# Patient Record
Sex: FEMALE | Race: WHITE | NOT HISPANIC OR LATINO | Employment: UNEMPLOYED | URBAN - METROPOLITAN AREA
[De-identification: names, ages, dates, MRNs, and addresses within clinical notes are randomized per-mention and may not be internally consistent; named-entity substitution may affect disease eponyms.]

---

## 2019-01-07 ENCOUNTER — OFFICE VISIT (OUTPATIENT)
Dept: OBSTETRICS AND GYNECOLOGY | Facility: CLINIC | Age: 34
End: 2019-01-07
Payer: MEDICAID

## 2019-01-07 ENCOUNTER — TELEPHONE (OUTPATIENT)
Dept: OBSTETRICS AND GYNECOLOGY | Facility: CLINIC | Age: 34
End: 2019-01-07

## 2019-01-07 VITALS — SYSTOLIC BLOOD PRESSURE: 116 MMHG | WEIGHT: 134.06 LBS | DIASTOLIC BLOOD PRESSURE: 70 MMHG

## 2019-01-07 DIAGNOSIS — O26.892 VAGINAL DISCHARGE DURING PREGNANCY IN SECOND TRIMESTER: ICD-10-CM

## 2019-01-07 DIAGNOSIS — N89.8 VAGINAL DISCHARGE DURING PREGNANCY IN SECOND TRIMESTER: ICD-10-CM

## 2019-01-07 DIAGNOSIS — O24.019 TYPE 1 DIABETES MELLITUS COMPLICATING PREGNANCY, ANTEPARTUM: Primary | ICD-10-CM

## 2019-01-07 DIAGNOSIS — Z32.01 POSITIVE PREGNANCY TEST: ICD-10-CM

## 2019-01-07 PROCEDURE — 99204 PR OFFICE/OUTPT VISIT, NEW, LEVL IV, 45-59 MIN: ICD-10-PCS | Mod: S$PBB,TH,, | Performed by: OBSTETRICS & GYNECOLOGY

## 2019-01-07 PROCEDURE — 87624 HPV HI-RISK TYP POOLED RSLT: CPT

## 2019-01-07 PROCEDURE — 99204 OFFICE O/P NEW MOD 45 MIN: CPT | Mod: S$PBB,TH,, | Performed by: OBSTETRICS & GYNECOLOGY

## 2019-01-07 PROCEDURE — 99999 PR PBB SHADOW E&M-NEW PATIENT-LVL IV: CPT | Mod: PBBFAC,,, | Performed by: OBSTETRICS & GYNECOLOGY

## 2019-01-07 PROCEDURE — 88175 CYTOPATH C/V AUTO FLUID REDO: CPT

## 2019-01-07 PROCEDURE — 87491 CHLMYD TRACH DNA AMP PROBE: CPT

## 2019-01-07 PROCEDURE — 99999 PR PBB SHADOW E&M-NEW PATIENT-LVL IV: ICD-10-PCS | Mod: PBBFAC,,, | Performed by: OBSTETRICS & GYNECOLOGY

## 2019-01-07 PROCEDURE — 87480 CANDIDA DNA DIR PROBE: CPT

## 2019-01-07 PROCEDURE — 99204 OFFICE O/P NEW MOD 45 MIN: CPT | Mod: PBBFAC,TH,PN | Performed by: OBSTETRICS & GYNECOLOGY

## 2019-01-07 RX ORDER — INSULIN ASPART 100 [IU]/ML
INJECTION, SOLUTION INTRAVENOUS; SUBCUTANEOUS
Qty: 10 ML | Refills: 5 | Status: SHIPPED | OUTPATIENT
Start: 2019-01-07 | End: 2019-09-17 | Stop reason: SDUPTHER

## 2019-01-07 RX ORDER — INSULIN GLARGINE 100 [IU]/ML
15 INJECTION, SOLUTION SUBCUTANEOUS 2 TIMES DAILY WITH MEALS
Qty: 10 ML | Refills: 5 | Status: SHIPPED | OUTPATIENT
Start: 2019-01-07 | End: 2019-02-26 | Stop reason: ALTCHOICE

## 2019-01-07 RX ORDER — INSULIN GLARGINE 100 [IU]/ML
INJECTION, SOLUTION SUBCUTANEOUS NIGHTLY
COMMUNITY
End: 2019-01-07 | Stop reason: SDUPTHER

## 2019-01-07 NOTE — TELEPHONE ENCOUNTER
----- Message from Philly Brush sent at 1/7/2019  3:15 PM CST -----  Contact: Fazal with [facility]  Fazal with [facility] [phone] called regarding LANTUS U-100 INSULIN; Novolog, free style test strips for above patient. They are requesting one of the following: Rx substitution. Fazal states that Lantuss and free style test strips are not covered by insurance and Novolog does not have a max daily dose. Please call Fazal. Thanks!

## 2019-01-07 NOTE — PROGRESS NOTES
Subjective:       Patient ID: Tish Landeros is a 33 y.o. female.    Chief Complaint:  Possible Pregnancy      History of Present Illness  HPI  Missed Menses/ Possible Pregnancy  Patient complains of menstrual irregularity.Last menses onset around 09/10/18 with regular menses reported. Patient reports positive UPT in 2018. She believes she could be pregnant. Pregnancy is desired. Sexual Activity: single partner, contraception: none. Current symptoms also include: positive home pregnancy test. Last period was normal.     GYN & OB History  Date of Last Pap: No result found   Patient reports having LEEP at age 16    OB History    Para Term  AB Living   3 2     1 2   SAB TAB Ectopic Multiple Live Births                  # Outcome Date GA Lbr [place]/2nd Weight Sex Delivery Anes PTL Lv   3 AB            2 Para            1 Para                   Review of Systems  Review of Systems   Constitutional: Negative for activity change, appetite change, chills, diaphoresis, fatigue, fever and unexpected weight change.   HENT: Negative for nasal congestion, mouth sores and tinnitus.    Eyes: Negative for discharge and visual disturbance.   Respiratory: Negative for cough, shortness of breath and wheezing.    Cardiovascular: Negative for chest pain, palpitations and leg swelling.   Gastrointestinal: Negative for abdominal pain, bloating, blood in stool, constipation, diarrhea, nausea, vomiting, reflux and fecal incontinence.   Endocrine: Positive for diabetes. Negative for hair loss, hot flashes, hyperthyroidism and hypothyroidism.   Genitourinary: Negative for bladder incontinence, decreased libido, dyspareunia, dysuria, flank pain, frequency, genital sores, hematuria, menorrhagia, menstrual problem, pelvic pain, urgency, vaginal bleeding, vaginal discharge, vaginal pain, dysmenorrhea, urinary incontinence, postcoital bleeding, postmenopausal bleeding and vaginal odor.   Musculoskeletal: Negative for  arthralgias, back pain, joint swelling, leg pain and myalgias.   Neurological: Negative for vertigo, seizures, syncope, numbness and headaches.   Hematological: Negative for adenopathy. Does not bruise/bleed easily.   Psychiatric/Behavioral: Negative for depression and sleep disturbance. The patient is not nervous/anxious.    Breast: Negative for asymmetry, breast self exam, lump, mass, mastodynia, nipple discharge, skin changes and tenderness          Objective:    Physical Exam:   Constitutional: She is oriented to person, place, and time. She appears well-developed and well-nourished. No distress.    HENT:   Head: Normocephalic.   Nose: No epistaxis.    Eyes: Pupils are equal, round, and reactive to light.    Neck: Normal range of motion.    Cardiovascular: Normal rate.     Pulmonary/Chest: Effort normal.        Abdominal: Soft. She exhibits no distension. There is no tenderness.     Genitourinary: Vaginal discharge found.   Genitourinary Comments: Uterus is 17 week size with positive FHT's @ 155bpm. Pap smear and cultures done of cervix.Cervix is friable. Vaginal cultures done.            Musculoskeletal: Normal range of motion and moves all extremeties.       Neurological: She is alert and oriented to person, place, and time.    Skin: Skin is warm and dry.    Psychiatric: She has a normal mood and affect. Her behavior is normal. Judgment and thought content normal.          Assessment:        1. Type 1 diabetes mellitus complicating pregnancy, antepartum    2. Positive pregnancy test                Plan:      Ultrasound and follow up in 1 week for initial prenatal visit   Referral to [facility] due to Type I D.M.

## 2019-01-08 ENCOUNTER — HOSPITAL ENCOUNTER (OUTPATIENT)
Dept: RADIOLOGY | Facility: HOSPITAL | Age: 34
Discharge: HOME | End: 2019-01-08
Attending: OBSTETRICS & GYNECOLOGY
Payer: MEDICAID

## 2019-01-08 DIAGNOSIS — Z32.01 POSITIVE PREGNANCY TEST: ICD-10-CM

## 2019-01-08 LAB
C TRACH DNA SPEC QL NAA+PROBE: NOT DETECTED
CANDIDA RRNA VAG QL PROBE: NEGATIVE
G VAGINALIS RRNA GENITAL QL PROBE: POSITIVE
N GONORRHOEA DNA SPEC QL NAA+PROBE: NOT DETECTED
T VAGINALIS RRNA GENITAL QL PROBE: NEGATIVE

## 2019-01-08 PROCEDURE — 76805 OB US >/= 14 WKS SNGL FETUS: CPT | Mod: 26,,, | Performed by: RADIOLOGY

## 2019-01-08 PROCEDURE — 76805 US OB GREATER THAN 14 WEEKS FIRST GESTATION: ICD-10-PCS | Mod: 26,,, | Performed by: RADIOLOGY

## 2019-01-08 PROCEDURE — 76805 OB US >/= 14 WKS SNGL FETUS: CPT | Mod: TC,PN

## 2019-01-09 PROBLEM — O26.892 VAGINAL DISCHARGE DURING PREGNANCY IN SECOND TRIMESTER: Status: ACTIVE | Noted: 2019-01-09

## 2019-01-09 PROBLEM — N89.8 VAGINAL DISCHARGE DURING PREGNANCY IN SECOND TRIMESTER: Status: ACTIVE | Noted: 2019-01-09

## 2019-01-09 RX ORDER — METRONIDAZOLE 500 MG/1
500 TABLET ORAL EVERY 12 HOURS
Qty: 14 TABLET | Refills: 0 | Status: SHIPPED | OUTPATIENT
Start: 2019-01-09 | End: 2019-01-16

## 2019-01-11 LAB
HPV HR 12 DNA CVX QL NAA+PROBE: POSITIVE
HPV16 AG SPEC QL: NEGATIVE
HPV18 DNA SPEC QL NAA+PROBE: NEGATIVE

## 2019-01-15 ENCOUNTER — LAB VISIT (OUTPATIENT)
Dept: LAB | Facility: HOSPITAL | Age: 34
End: 2019-01-15
Attending: OBSTETRICS & GYNECOLOGY
Payer: MEDICAID

## 2019-01-15 ENCOUNTER — ROUTINE PRENATAL (OUTPATIENT)
Dept: OBSTETRICS AND GYNECOLOGY | Facility: CLINIC | Age: 34
End: 2019-01-15
Payer: MEDICAID

## 2019-01-15 VITALS — WEIGHT: 139.31 LBS | DIASTOLIC BLOOD PRESSURE: 74 MMHG | SYSTOLIC BLOOD PRESSURE: 112 MMHG

## 2019-01-15 DIAGNOSIS — Z3A.19 19 WEEKS GESTATION OF PREGNANCY: Primary | ICD-10-CM

## 2019-01-15 DIAGNOSIS — O24.012 PRE-EXISTING TYPE 1 DIABETES MELLITUS DURING PREGNANCY IN SECOND TRIMESTER: ICD-10-CM

## 2019-01-15 DIAGNOSIS — Z3A.19 19 WEEKS GESTATION OF PREGNANCY: ICD-10-CM

## 2019-01-15 DIAGNOSIS — R87.810 PAP SMEAR OF CERVIX SHOWS HIGH RISK HPV PRESENT: ICD-10-CM

## 2019-01-15 LAB
BASOPHILS # BLD AUTO: 0.06 K/UL
BASOPHILS NFR BLD: 0.7 %
BILIRUB SERPL-MCNC: ABNORMAL MG/DL
BLOOD URINE, POC: ABNORMAL
COLOR, POC UA: ABNORMAL
DIFFERENTIAL METHOD: NORMAL
EOSINOPHIL # BLD AUTO: 0.2 K/UL
EOSINOPHIL NFR BLD: 2.4 %
ERYTHROCYTE [DISTWIDTH] IN BLOOD BY AUTOMATED COUNT: 12.6 %
GLUCOSE UR QL STRIP: ABNORMAL
HCT VFR BLD AUTO: 41 %
HGB BLD-MCNC: 13.7 G/DL
IMM GRANULOCYTES # BLD AUTO: 0.03 K/UL
IMM GRANULOCYTES NFR BLD AUTO: 0.3 %
KETONES UR QL STRIP: ABNORMAL
LEUKOCYTE ESTERASE URINE, POC: ABNORMAL
LYMPHOCYTES # BLD AUTO: 1.9 K/UL
LYMPHOCYTES NFR BLD: 21.2 %
MCH RBC QN AUTO: 30.7 PG
MCHC RBC AUTO-ENTMCNC: 33.4 G/DL
MCV RBC AUTO: 92 FL
MONOCYTES # BLD AUTO: 0.6 K/UL
MONOCYTES NFR BLD: 7 %
NEUTROPHILS # BLD AUTO: 6.2 K/UL
NEUTROPHILS NFR BLD: 68.4 %
NITRITE, POC UA: ABNORMAL
NRBC BLD-RTO: 0 /100 WBC
PH, POC UA: 7
PLATELET # BLD AUTO: 276 K/UL
PMV BLD AUTO: 11 FL
PROTEIN, POC: ABNORMAL
RBC # BLD AUTO: 4.46 M/UL
SPECIFIC GRAVITY, POC UA: ABNORMAL
UROBILINOGEN, POC UA: ABNORMAL
WBC # BLD AUTO: 9.06 K/UL

## 2019-01-15 PROCEDURE — 99213 PR OFFICE/OUTPT VISIT, EST, LEVL III, 20-29 MIN: ICD-10-PCS | Mod: TH,25,S$PBB, | Performed by: OBSTETRICS & GYNECOLOGY

## 2019-01-15 PROCEDURE — 88305 TISSUE SPECIMEN TO PATHOLOGY, OBSTETRICS/GYNECOLOGY: ICD-10-PCS | Mod: 26,,, | Performed by: PATHOLOGY

## 2019-01-15 PROCEDURE — 81511 FTL CGEN ABNOR FOUR ANAL: CPT

## 2019-01-15 PROCEDURE — 57455 BIOPSY OF CERVIX W/SCOPE: CPT | Mod: S$PBB,,, | Performed by: OBSTETRICS & GYNECOLOGY

## 2019-01-15 PROCEDURE — 99999 PR PBB SHADOW E&M-EST. PATIENT-LVL III: ICD-10-PCS | Mod: PBBFAC,,, | Performed by: OBSTETRICS & GYNECOLOGY

## 2019-01-15 PROCEDURE — 88305 TISSUE EXAM BY PATHOLOGIST: CPT | Performed by: PATHOLOGY

## 2019-01-15 PROCEDURE — 88342 IMHCHEM/IMCYTCHM 1ST ANTB: CPT | Performed by: PATHOLOGY

## 2019-01-15 PROCEDURE — 88342 TISSUE SPECIMEN TO PATHOLOGY, OBSTETRICS/GYNECOLOGY: ICD-10-PCS | Mod: 26,,, | Performed by: PATHOLOGY

## 2019-01-15 PROCEDURE — 99213 OFFICE O/P EST LOW 20 MIN: CPT | Mod: PBBFAC,TH,PN | Performed by: OBSTETRICS & GYNECOLOGY

## 2019-01-15 PROCEDURE — 80074 ACUTE HEPATITIS PANEL: CPT

## 2019-01-15 PROCEDURE — 57455 BIOPSY OF CERVIX W/SCOPE: CPT | Mod: PBBFAC,PN | Performed by: OBSTETRICS & GYNECOLOGY

## 2019-01-15 PROCEDURE — 88305 TISSUE EXAM BY PATHOLOGIST: CPT | Mod: 26,,, | Performed by: PATHOLOGY

## 2019-01-15 PROCEDURE — 86592 SYPHILIS TEST NON-TREP QUAL: CPT

## 2019-01-15 PROCEDURE — 85025 COMPLETE CBC W/AUTO DIFF WBC: CPT

## 2019-01-15 PROCEDURE — 86762 RUBELLA ANTIBODY: CPT

## 2019-01-15 PROCEDURE — 36415 COLL VENOUS BLD VENIPUNCTURE: CPT | Mod: PO

## 2019-01-15 PROCEDURE — 81002 URINALYSIS NONAUTO W/O SCOPE: CPT | Mod: PBBFAC,PN | Performed by: OBSTETRICS & GYNECOLOGY

## 2019-01-15 PROCEDURE — 86703 HIV-1/HIV-2 1 RESULT ANTBDY: CPT

## 2019-01-15 PROCEDURE — 99213 OFFICE O/P EST LOW 20 MIN: CPT | Mod: TH,25,S$PBB, | Performed by: OBSTETRICS & GYNECOLOGY

## 2019-01-15 PROCEDURE — 57455 PR COLPOSCOPY,CERVIX W/ADJ VAGINA,W/BX: ICD-10-PCS | Mod: S$PBB,,, | Performed by: OBSTETRICS & GYNECOLOGY

## 2019-01-15 PROCEDURE — 88342 IMHCHEM/IMCYTCHM 1ST ANTB: CPT | Mod: 26,,, | Performed by: PATHOLOGY

## 2019-01-15 PROCEDURE — 99999 PR PBB SHADOW E&M-EST. PATIENT-LVL III: CPT | Mod: PBBFAC,,, | Performed by: OBSTETRICS & GYNECOLOGY

## 2019-01-15 NOTE — PROGRESS NOTES
Patient reports blood sugars with good control. 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 19 weeks gestation of pregnancy  Maternal Quad Screen    Cystic Fibrosis Mutation Panel    RPR    CBC auto differential    Rubella antibody, IgG    Hepatitis panel, acute    HIV 1/2 Ag/Ab (4th Gen)       PRE-COLPOSCOPY PROCEDURE COUNSELING:  Discussed the abnormal pap test findings, HPV, need for colposcopy and possible biopsies to determine a diagnosis and plan of care, treatments available, the minimal risks of bleeding and infection with a colposcopy, alternatives to colposcopy and she agrees to proceed.    Procedure:   Speculum was placed. Cervix completely visualized.  transformation zone clearly visualized. Green filter activated: vascular abnormalities: not seen  Green filter disengaged and acetic acid applied in the usual fashion:  AcetoWhite epithelium seen.  Mosaic pattern not  seen.  Biopsy performed at 1 o'clock.  ECC not  done.    Monsel's solution applied to biopsy site for hemostasis and tolerated well.   The speculum was removed.  The patient tolerated the procedure well.    POST COLPOSCOPY COUNSELING:   Manage post colposcopy cramping with NSAIDs, Tylenol or Rx per [employer].  Avoid anything in vagina (intercourse, douching, tampons) one week after the procedure.  Expect a clumpy blackish vaginal discharge (Monsel's solution) for several days.  Report bleeding heavier than a period, worsening pain, fever > 101.0 F, or foul-smelling vaginal discharge.  HPV vaccine recommended according to FDA age guidelines.  Importance of follow-up stressed.    Counseling lasted approximately 15 minutes and all her questions were answered.    Assessment:  1. 19 weeks gestation of pregnancy  Maternal Quad Screen    Cystic Fibrosis Mutation Panel    RPR    CBC auto differential    Rubella antibody, IgG    Hepatitis panel, acute    HIV 1/2 Ag/Ab (4th Gen)        Plan:    Patient informed will be contacted within 2 weeks of results, either normal or abnormal. Please call if she has not heard from us by then.

## 2019-01-16 ENCOUNTER — TELEPHONE (OUTPATIENT)
Dept: OBSTETRICS AND GYNECOLOGY | Facility: CLINIC | Age: 34
End: 2019-01-16

## 2019-01-16 LAB
HAV IGM SERPL QL IA: NEGATIVE
HBV CORE IGM SERPL QL IA: NEGATIVE
HBV SURFACE AG SERPL QL IA: NEGATIVE
HCV AB SERPL QL IA: NEGATIVE
HIV 1+2 AB+HIV1 P24 AG SERPL QL IA: NEGATIVE
RPR SER QL: NORMAL
RUBV IGG SER-ACNC: 39.4 IU/ML
RUBV IGG SER-IMP: REACTIVE

## 2019-01-16 NOTE — TELEPHONE ENCOUNTER
I called the insurance to check on the PA and was told that I they do not handle [employer] PA claims. I was given a new number to call and got intouch with [employer]. A PA has been started and marked urgent. I was told by the rep on the phone that the pharmacist would review this and get back to me either today or in the morning. I let the patient know I would call her as soon as we received a call.

## 2019-01-16 NOTE — TELEPHONE ENCOUNTER
----- Message from Fartun Driscoll sent at 1/16/2019  3:55 PM CST -----  Contact: Patient  Type:  RX Refill Request    Who Called:  Patient    RX Name and Strength:      1.  insulin aspart U-100 (NOVOLOG U-100 INSULIN ASPART) 100 unit/mL injection       10 units prn elevated glucose    2.  insulin glargine (LANTUS U-100 INSULIN) 100 unit/mL injection       Inject 15 Units into the skin 2 (two) times daily with meals. - Subcutaneous    Preferred Pharmacy with phone number:    [facility] - [place], [place] - [address]  [address]  [facility] [address]  Phone: [phone] Fax: [phone]    Local or Mail Order: Local  Ordering Provider:  Dr. Lm Rosales Call Back Number:  [phone]  Additional Information:  Patient is currently out of her Lantus, and with her new insurance, she is required to have a Pre-Authorization for both of these medications.  The pharmacy is waiting for that PA.   Patient states that her first request for these prior authorizations was four days ago    Please call to advise ASAP  Thank you

## 2019-01-17 ENCOUNTER — TELEPHONE (OUTPATIENT)
Dept: OBSTETRICS AND GYNECOLOGY | Facility: CLINIC | Age: 34
End: 2019-01-17

## 2019-01-17 LAB
# FETUSES US: NORMAL
2ND TRIMESTER 4 SCREEN PNL SERPL: NEGATIVE
2ND TRIMESTER 4 SCREEN SERPL-IMP: NORMAL
AFP MOM SERPL: 1.07
AFP SERPL-MCNC: 49.9 NG/ML
AGE AT DELIVERY: 34
B-HCG MOM SERPL: 1.41
B-HCG SERPL-ACNC: 30.3 IU/ML
FET TS 21 RISK FROM MAT AGE: NORMAL
GA (DAYS): 2 D
GA (WEEKS): 19 WK
GA METHOD: NORMAL
IDDM PATIENT QL: NORMAL
INHIBIN A MOM SERPL: 1.2
INHIBIN A SERPL-MCNC: 224.3 PG/ML
SMOKING STATUS FTND: NO
TS 18 RISK FETUS: NORMAL
TS 21 RISK FETUS: NORMAL
U ESTRIOL MOM SERPL: 0.88
U ESTRIOL SERPL-MCNC: 1.41 NG/ML

## 2019-01-17 NOTE — TELEPHONE ENCOUNTER
----- Message from Kamari Hernandez sent at 1/17/2019  9:51 AM CST -----  Contact: Brigitte - [facility]  Type: Needs Medical Advice    Who Called:  Brigitte  Bobby Call Back Number: [phone] (Reference Number: [id number])  Additional Information: Caller has received orders with no instructions. Please call to advise. Thanks!

## 2019-01-17 NOTE — TELEPHONE ENCOUNTER
Prior auth denied due to no directions on insulin. Called pharmacy back [phone], case reopened, and appropriate directions for insulin given- novolog flexpen 100 units/ml. Inject 10 units prn elevated glucose. Pa being expedited. Should hear back either this afternoon or tomorrow. Will let patient know once decision is made.

## 2019-01-17 NOTE — TELEPHONE ENCOUNTER
Called pt and spoke to her regarding novolog prior auth being approved.   novolog 100 units/ml flex pen approved from 01/17/19- 01/17/20. Pt verbalized understanding.

## 2019-01-17 NOTE — TELEPHONE ENCOUNTER
Spoke with patient regarding PA on insulin. Still awaiting decision. Pt verbalized understanding and will call pt once decision has been made.

## 2019-01-18 ENCOUNTER — TELEPHONE (OUTPATIENT)
Dept: OBSTETRICS AND GYNECOLOGY | Facility: CLINIC | Age: 34
End: 2019-01-18

## 2019-01-18 DIAGNOSIS — O24.912 DIABETES MELLITUS AFFECTING PREGNANCY IN SECOND TRIMESTER: Primary | ICD-10-CM

## 2019-01-18 RX ORDER — INSULIN GLARGINE 100 [IU]/ML
15 INJECTION, SOLUTION SUBCUTANEOUS 2 TIMES DAILY
Qty: 3 ML | Refills: 5
Start: 2019-01-18 | End: 2019-09-16 | Stop reason: SDUPTHER

## 2019-01-18 NOTE — TELEPHONE ENCOUNTER
----- Message from Arelis Browne sent at 1/17/2019  2:51 PM CST -----  Contact: Self  Type:  RX Refill Request    Who Called:  patient  Refill or New Rx: refill  RX Name and Strength:  Pen needles to go along with the Novalog and Lantus  How is the patient currently taking it? (ex. 1XDay):  4 to 6XDay  Is this a 30 day or 90 day RX:  90  Preferred Pharmacy with phone number:    [facility] - [place], [place] - [address]  [address]  [facility] [address]  Phone: [phone] Fax: [phone]  Local or Mail Order:  Local   Ordering Provider:  Dr Yrn Rosales Call Back Number:  [phone] (home)   Additional Information:  The [facility] pharm was suppose to fax over a prior authorization form to you for her Lantus today and she is out of it.  Please get that sent over as soon as possible.

## 2019-01-18 NOTE — TELEPHONE ENCOUNTER
Medicaid is denying the Latus insulin stating the patient must try the BASAGLAR KwikPen. BASAGLAR KwikPen (Pen) is a disposable prefilled pen containing 300 units (3mL) of BASAGLAR. Please send a script with directions for use to the pharmacy.

## 2019-01-23 ENCOUNTER — LAB VISIT (OUTPATIENT)
Dept: LAB | Facility: HOSPITAL | Age: 34
End: 2019-01-23
Attending: OBSTETRICS & GYNECOLOGY
Payer: MEDICAID

## 2019-01-23 DIAGNOSIS — O24.919 DM (DIABETES MELLITUS) IN PREGNANCY: ICD-10-CM

## 2019-01-23 DIAGNOSIS — O24.019 TYPE 1 DIABETES MELLITUS COMPLICATING PREGNANCY, ANTEPARTUM: ICD-10-CM

## 2019-01-23 LAB
ESTIMATED AVG GLUCOSE: 183 MG/DL
HBA1C MFR BLD HPLC: 8 %

## 2019-01-23 PROCEDURE — 36415 COLL VENOUS BLD VENIPUNCTURE: CPT | Mod: PO

## 2019-01-23 PROCEDURE — 83036 HEMOGLOBIN GLYCOSYLATED A1C: CPT

## 2019-01-29 ENCOUNTER — ROUTINE PRENATAL (OUTPATIENT)
Dept: OBSTETRICS AND GYNECOLOGY | Facility: CLINIC | Age: 34
End: 2019-01-29
Payer: MEDICAID

## 2019-01-29 VITALS
WEIGHT: 142.19 LBS | SYSTOLIC BLOOD PRESSURE: 112 MMHG | DIASTOLIC BLOOD PRESSURE: 66 MMHG | BODY MASS INDEX: 25.19 KG/M2

## 2019-01-29 DIAGNOSIS — O24.912 DIABETES MELLITUS AFFECTING PREGNANCY IN SECOND TRIMESTER: ICD-10-CM

## 2019-01-29 DIAGNOSIS — Z3A.21 21 WEEKS GESTATION OF PREGNANCY: Primary | ICD-10-CM

## 2019-01-29 LAB
BILIRUB SERPL-MCNC: NEGATIVE MG/DL
BLOOD URINE, POC: NEGATIVE
COLOR, POC UA: NORMAL
GLUCOSE UR QL STRIP: 500
KETONES UR QL STRIP: NEGATIVE
LEUKOCYTE ESTERASE URINE, POC: NEGATIVE
NITRITE, POC UA: NEGATIVE
PH, POC UA: 6
PROTEIN, POC: NORMAL
SPECIFIC GRAVITY, POC UA: NORMAL
UROBILINOGEN, POC UA: 1

## 2019-01-29 PROCEDURE — 99213 OFFICE O/P EST LOW 20 MIN: CPT | Mod: TH,S$PBB,, | Performed by: OBSTETRICS & GYNECOLOGY

## 2019-01-29 PROCEDURE — 99213 PR OFFICE/OUTPT VISIT, EST, LEVL III, 20-29 MIN: ICD-10-PCS | Mod: TH,S$PBB,, | Performed by: OBSTETRICS & GYNECOLOGY

## 2019-01-29 PROCEDURE — 99212 OFFICE O/P EST SF 10 MIN: CPT | Mod: PBBFAC,TH,PN | Performed by: OBSTETRICS & GYNECOLOGY

## 2019-01-29 PROCEDURE — 81002 URINALYSIS NONAUTO W/O SCOPE: CPT | Mod: PBBFAC,PN | Performed by: OBSTETRICS & GYNECOLOGY

## 2019-01-29 PROCEDURE — 99999 PR PBB SHADOW E&M-EST. PATIENT-LVL II: CPT | Mod: PBBFAC,,, | Performed by: OBSTETRICS & GYNECOLOGY

## 2019-01-29 PROCEDURE — 99999 PR PBB SHADOW E&M-EST. PATIENT-LVL II: ICD-10-PCS | Mod: PBBFAC,,, | Performed by: OBSTETRICS & GYNECOLOGY

## 2019-01-29 NOTE — PROGRESS NOTES
Patient complains of constipation. Recommend increase in hydration,fiber intake,and physical activity.Patient may use laxatives as needed.Patient scheduled for follow up with [facility] for poorly controlled type 1 D.M.

## 2019-01-31 ENCOUNTER — LAB VISIT (OUTPATIENT)
Dept: LAB | Facility: HOSPITAL | Age: 34
End: 2019-01-31
Attending: OBSTETRICS & GYNECOLOGY
Payer: MEDICAID

## 2019-01-31 ENCOUNTER — TELEPHONE (OUTPATIENT)
Dept: OBSTETRICS AND GYNECOLOGY | Facility: CLINIC | Age: 34
End: 2019-01-31

## 2019-01-31 DIAGNOSIS — Z3A.21 21 WEEKS GESTATION OF PREGNANCY: Primary | ICD-10-CM

## 2019-01-31 DIAGNOSIS — Z3A.21 21 WEEKS GESTATION OF PREGNANCY: ICD-10-CM

## 2019-01-31 LAB
ABO + RH BLD: NORMAL
ALBUMIN SERPL BCP-MCNC: 3.1 G/DL
ALP SERPL-CCNC: 56 U/L
ALT SERPL W/O P-5'-P-CCNC: 11 U/L
ANION GAP SERPL CALC-SCNC: 5 MMOL/L
AST SERPL-CCNC: 14 U/L
BILIRUB SERPL-MCNC: 0.3 MG/DL
BLD GP AB SCN CELLS X3 SERPL QL: NORMAL
BUN SERPL-MCNC: 9 MG/DL
CALCIUM SERPL-MCNC: 9.3 MG/DL
CHLORIDE SERPL-SCNC: 101 MMOL/L
CO2 SERPL-SCNC: 27 MMOL/L
CREAT SERPL-MCNC: 0.6 MG/DL
CREAT UR-MCNC: 215 MG/DL
EST. GFR  (AFRICAN AMERICAN): >60 ML/MIN/1.73 M^2
EST. GFR  (NON AFRICAN AMERICAN): >60 ML/MIN/1.73 M^2
GLUCOSE SERPL-MCNC: 135 MG/DL
POTASSIUM SERPL-SCNC: 3.6 MMOL/L
PROT SERPL-MCNC: 6.9 G/DL
PROT UR-MCNC: 14 MG/DL
PROT/CREAT UR: 0.07 MG/G{CREAT}
SODIUM SERPL-SCNC: 133 MMOL/L

## 2019-01-31 PROCEDURE — 86850 RBC ANTIBODY SCREEN: CPT

## 2019-01-31 PROCEDURE — 82570 ASSAY OF URINE CREATININE: CPT

## 2019-01-31 PROCEDURE — 80053 COMPREHEN METABOLIC PANEL: CPT

## 2019-01-31 PROCEDURE — 36415 COLL VENOUS BLD VENIPUNCTURE: CPT | Mod: PO

## 2019-01-31 NOTE — TELEPHONE ENCOUNTER
----- Message from Daphnie Cancino LPN sent at 1/31/2019  3:10 PM CST -----  Contact: Tammi in Lab  Please call x [phone] has questions re lab order for Urine

## 2019-02-04 ENCOUNTER — OFFICE VISIT (OUTPATIENT)
Dept: PEDIATRIC CARDIOLOGY | Facility: CLINIC | Age: 34
End: 2019-02-04
Payer: MEDICAID

## 2019-02-04 ENCOUNTER — CLINICAL SUPPORT (OUTPATIENT)
Dept: PEDIATRIC CARDIOLOGY | Facility: CLINIC | Age: 34
End: 2019-02-04
Attending: OBSTETRICS & GYNECOLOGY
Payer: MEDICAID

## 2019-02-04 VITALS
WEIGHT: 142 LBS | HEIGHT: 63 IN | SYSTOLIC BLOOD PRESSURE: 115 MMHG | DIASTOLIC BLOOD PRESSURE: 75 MMHG | HEART RATE: 78 BPM | BODY MASS INDEX: 25.16 KG/M2

## 2019-02-04 DIAGNOSIS — O24.419 GESTATIONAL DIABETES MELLITUS (GDM), ANTEPARTUM, GESTATIONAL DIABETES METHOD OF CONTROL UNSPECIFIED: ICD-10-CM

## 2019-02-04 DIAGNOSIS — O35.BXX0 PREGNANCY COMPLICATED BY FETAL CONGENITAL HEART DISEASE, SINGLE OR UNSPECIFIED FETUS: ICD-10-CM

## 2019-02-04 DIAGNOSIS — O24.019 TYPE 1 DIABETES MELLITUS COMPLICATING PREGNANCY, ANTEPARTUM: Primary | ICD-10-CM

## 2019-02-04 DIAGNOSIS — O24.919 DM (DIABETES MELLITUS) IN PREGNANCY: ICD-10-CM

## 2019-02-04 PROCEDURE — 93325 PR DOPPLER COLOR FLOW VELOCITY MAP: ICD-10-PCS | Mod: 26,S$PBB,, | Performed by: PEDIATRICS

## 2019-02-04 PROCEDURE — 76825 ECHO EXAM OF FETAL HEART: CPT | Mod: PBBFAC,PO | Performed by: PEDIATRICS

## 2019-02-04 PROCEDURE — 99213 OFFICE O/P EST LOW 20 MIN: CPT | Mod: PBBFAC,PO,25 | Performed by: PEDIATRICS

## 2019-02-04 PROCEDURE — 76827 PR  SO2 FETAL HEART DOPPLER: ICD-10-PCS | Mod: 26,S$PBB,, | Performed by: PEDIATRICS

## 2019-02-04 PROCEDURE — 99999 PR PBB SHADOW E&M-EST. PATIENT-LVL III: CPT | Mod: PBBFAC,,, | Performed by: PEDIATRICS

## 2019-02-04 PROCEDURE — 99214 OFFICE O/P EST MOD 30 MIN: CPT | Mod: 25,S$PBB,, | Performed by: PEDIATRICS

## 2019-02-04 PROCEDURE — 93325 DOPPLER ECHO COLOR FLOW MAPG: CPT | Mod: PBBFAC,PO | Performed by: PEDIATRICS

## 2019-02-04 PROCEDURE — 76827 ECHO EXAM OF FETAL HEART: CPT | Mod: PBBFAC,PO | Performed by: PEDIATRICS

## 2019-02-04 PROCEDURE — 76825 ECHO EXAM OF FETAL HEART: CPT | Mod: 26,S$PBB,, | Performed by: PEDIATRICS

## 2019-02-04 PROCEDURE — 99214 PR OFFICE/OUTPT VISIT, EST, LEVL IV, 30-39 MIN: ICD-10-PCS | Mod: 25,S$PBB,, | Performed by: PEDIATRICS

## 2019-02-04 PROCEDURE — 93325 DOPPLER ECHO COLOR FLOW MAPG: CPT | Mod: 26,S$PBB,, | Performed by: PEDIATRICS

## 2019-02-04 PROCEDURE — 99999 PR PBB SHADOW E&M-EST. PATIENT-LVL III: ICD-10-PCS | Mod: PBBFAC,,, | Performed by: PEDIATRICS

## 2019-02-04 PROCEDURE — 76825 PR  SO2 FETAL HEART: ICD-10-PCS | Mod: 26,S$PBB,, | Performed by: PEDIATRICS

## 2019-02-04 PROCEDURE — 76827 ECHO EXAM OF FETAL HEART: CPT | Mod: 26,S$PBB,, | Performed by: PEDIATRICS

## 2019-02-04 RX ORDER — ASPIRIN 81 MG/1
81 TABLET ORAL DAILY
COMMUNITY
End: 2020-01-17 | Stop reason: ALTCHOICE

## 2019-02-04 NOTE — LETTER
February 5, 2019      Lm Pool MD  [address]  [address]  [facility] [address]           [facility] Pediatric Cardiology  [address] [place] [address] [address]  [facility] [address]  Phone: [phone]  Fax: [phone]          Patient: Tish Landeros   MR Number: [id number]   YOB: 1985   Date of Visit: 2/4/2019       Dear Dr. Lm Pool:    Thank you for referring Tish Landeros to me for evaluation. Attached you will find relevant portions of my assessment and plan of care.    If you have questions, please do not hesitate to call me. I look forward to following Tish Landeros along with you.    Sincerely,    Leonid Miranda MD    Enclosure  CC:  No Recipients    If you would like to receive this communication electronically, please contact [email or web address] or [phone] to request more information on [facility] access.    For providers and/or their staff who would like to refer a patient to [facility], please contact us through our one-stop-shop provider referral line, [facility], at [phone].    If you feel you have received this communication in error or would no longer like to receive these types of communications, please e-mail [email or web address]

## 2019-02-05 NOTE — PROGRESS NOTES
"[place]- Pediatric Cardiology Fetal Cardiology Clinic    Today, I had the pleasure of evaluating Tish Landeros who is now 33 y.o. and carrying her fourth pregnancy at 22 1/7 weeks gestation with an ALBERTA of 19. She was referred for evaluation of the fetal heart due a maternal history of type I diabetes mellitus.  She was diagnosed with diabetes 6 years ago.  She was without health insurance during the beginning of the pregnancy, so he sugars were not well controlled at that time.  Her current hemoglobin A1C level is ~8%.    She is carrying a male fetus, yet unnamed.      Obstetric History:    .  She has had one miscarriage and two term babies with a different father.  Her OB history is otherwise unremarkable. She sees Dr. Lm Najera for her OB care.  She has seen Dr. Mckeon for her M care.    Past Medical History:   Diagnosis Date    Diabetes mellitus          Current Outpatient Medications:     aspirin (ECOTRIN) 81 MG EC tablet, Take 81 mg by mouth once daily., Disp: , Rfl:     blood sugar diagnostic (BLOOD GLUCOSE TEST) Strp, 1 strip by Misc.(Non-Drug; Combo Route) route 4 (four) times daily., Disp: 100 strip, Rfl: 5    insulin (BASAGLAR KWIKPEN U-100 INSULIN) glargine 100 units/mL (3mL) SubQ pen, Inject 15 Units into the skin 2 (two) times daily., Disp: 3 mL, Rfl: 5    insulin aspart U-100 (NOVOLOG U-100 INSULIN ASPART) 100 unit/mL injection, 10 units prn elevated glucose, Disp: 10 mL, Rfl: 5    insulin glargine (LANTUS U-100 INSULIN) 100 unit/mL injection, Inject 15 Units into the skin 2 (two) times daily with meals., Disp: 10 mL, Rfl: 5    prenatal vit/iron fum/folic ac (PRENATAL 1+1 ORAL), Take by mouth., Disp: , Rfl:     Family History: A paternal aunt  of a "hole in the heart" but no other details were available.  Otherwise, negative for congenital heart disease, early coronary artery disease, sudden unexplained death, connective tissues disorders, genetic syndromes, multiple " miscarriages or other congenital anomalies.    Social History: Ms. Landeros is dating the father of the baby.  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    FETAL ECHOCARDIOGRAM (summary):  Fetal echocardiogram at 22 1/7 for a history of pregestational diabetes mellitus type I. ALBERTA 6/9/19.  Normally connected heart.  No ectopy or sustained arrhythmia demonstrated throughout the study.  Normal fetal atrial and ductal level shunting.  No ventricular level shunting.  Normal atrioventricular and semilunar valve structure and function.  There is mild aortic valve insufficiency.  Normal ductal and aortic arches.  Normal biventricular size and systolic function.  No pericardial effusion.  (Full report in electronic medical record)    Impression:  Single active male fetus at 22 wga.  Overall, normal fetal echocardiogram but with mild aortic insufficiency.  The rest of the left heart is normal.  My suspicion is that the aortic valve may be bicuspid.  I will have her see me in [place] to further assess the fetus in 4-6 weeks.    Todays fetal echocardiogram is normal, within the limitations of fetal echocardiography.  I discussed with her that fetal echocardiography is insufficiently sensitive to rule out all septal defects, anomalies of pulmonary and systemic veins, arch anomalies, and some valvar abnormalities, nor can it ensure that the ductus arteriosus and foramen ovale will spontaneously close.     Recommendations:  If the degree of aortic regurgitation remains stable, I think she can deliver on the [facility].  However, if things progress, she will likely need delivery in [place].  I will have a better idea about this at follow up.    The above information was discussed in detail including the use of diagrams, with 40 minutes of total face to face time, with greater than 50% with counseling and coordination of care.  The discussion of the diagnosis and treatment options is as described above.       Leonid Miranda MD, MPH  Pediatric and Fetal Cardiology  [facility]   [address] [place] [address]    Office: [phone]  Cell: [phone]

## 2019-02-26 ENCOUNTER — ROUTINE PRENATAL (OUTPATIENT)
Dept: OBSTETRICS AND GYNECOLOGY | Facility: CLINIC | Age: 34
End: 2019-02-26
Payer: MEDICAID

## 2019-02-26 VITALS — BODY MASS INDEX: 25.74 KG/M2 | SYSTOLIC BLOOD PRESSURE: 98 MMHG | DIASTOLIC BLOOD PRESSURE: 62 MMHG | WEIGHT: 145.31 LBS

## 2019-02-26 DIAGNOSIS — Z3A.25 25 WEEKS GESTATION OF PREGNANCY: Primary | ICD-10-CM

## 2019-02-26 DIAGNOSIS — O24.012 PRE-EXISTING TYPE 1 DIABETES MELLITUS DURING PREGNANCY IN SECOND TRIMESTER: ICD-10-CM

## 2019-02-26 PROCEDURE — 99213 OFFICE O/P EST LOW 20 MIN: CPT | Mod: TH,S$PBB,, | Performed by: OBSTETRICS & GYNECOLOGY

## 2019-02-26 PROCEDURE — 99213 PR OFFICE/OUTPT VISIT, EST, LEVL III, 20-29 MIN: ICD-10-PCS | Mod: TH,S$PBB,, | Performed by: OBSTETRICS & GYNECOLOGY

## 2019-02-26 PROCEDURE — 99212 OFFICE O/P EST SF 10 MIN: CPT | Mod: PBBFAC,TH,PN | Performed by: OBSTETRICS & GYNECOLOGY

## 2019-02-26 PROCEDURE — 99999 PR PBB SHADOW E&M-EST. PATIENT-LVL II: ICD-10-PCS | Mod: PBBFAC,,, | Performed by: OBSTETRICS & GYNECOLOGY

## 2019-02-26 PROCEDURE — 99999 PR PBB SHADOW E&M-EST. PATIENT-LVL II: CPT | Mod: PBBFAC,,, | Performed by: OBSTETRICS & GYNECOLOGY

## 2019-02-26 NOTE — PROGRESS NOTES
Patient reports no problems and blood glucose levels within normal limits most of the time. Patient to be scheduled for eye exam and EKG.

## 2019-03-26 ENCOUNTER — ROUTINE PRENATAL (OUTPATIENT)
Dept: OBSTETRICS AND GYNECOLOGY | Facility: CLINIC | Age: 34
End: 2019-03-26
Payer: MEDICAID

## 2019-03-26 VITALS — WEIGHT: 145.5 LBS | BODY MASS INDEX: 25.77 KG/M2 | DIASTOLIC BLOOD PRESSURE: 62 MMHG | SYSTOLIC BLOOD PRESSURE: 102 MMHG

## 2019-03-26 DIAGNOSIS — O24.013 PRE-EXISTING TYPE 1 DIABETES MELLITUS DURING PREGNANCY IN THIRD TRIMESTER: ICD-10-CM

## 2019-03-26 DIAGNOSIS — Z3A.29 29 WEEKS GESTATION OF PREGNANCY: Primary | ICD-10-CM

## 2019-03-26 LAB
BILIRUB SERPL-MCNC: ABNORMAL MG/DL
BLOOD URINE, POC: ABNORMAL
COLOR, POC UA: ABNORMAL
GLUCOSE UR QL STRIP: ABNORMAL
KETONES UR QL STRIP: ABNORMAL
LEUKOCYTE ESTERASE URINE, POC: ABNORMAL
NITRITE, POC UA: ABNORMAL
PH, POC UA: 6
PROTEIN, POC: ABNORMAL
SPECIFIC GRAVITY, POC UA: ABNORMAL
UROBILINOGEN, POC UA: ABNORMAL

## 2019-03-26 PROCEDURE — 99999 PR PBB SHADOW E&M-EST. PATIENT-LVL II: ICD-10-PCS | Mod: PBBFAC,,, | Performed by: OBSTETRICS & GYNECOLOGY

## 2019-03-26 PROCEDURE — 99213 PR OFFICE/OUTPT VISIT, EST, LEVL III, 20-29 MIN: ICD-10-PCS | Mod: TH,S$PBB,, | Performed by: OBSTETRICS & GYNECOLOGY

## 2019-03-26 PROCEDURE — 99213 OFFICE O/P EST LOW 20 MIN: CPT | Mod: TH,S$PBB,, | Performed by: OBSTETRICS & GYNECOLOGY

## 2019-03-26 PROCEDURE — 99999 PR PBB SHADOW E&M-EST. PATIENT-LVL II: CPT | Mod: PBBFAC,,, | Performed by: OBSTETRICS & GYNECOLOGY

## 2019-03-26 PROCEDURE — 81002 URINALYSIS NONAUTO W/O SCOPE: CPT | Mod: PBBFAC,PN | Performed by: OBSTETRICS & GYNECOLOGY

## 2019-03-26 PROCEDURE — 99212 OFFICE O/P EST SF 10 MIN: CPT | Mod: PBBFAC,TH,PN | Performed by: OBSTETRICS & GYNECOLOGY

## 2019-03-27 ENCOUNTER — OFFICE VISIT (OUTPATIENT)
Dept: PEDIATRIC CARDIOLOGY | Facility: CLINIC | Age: 34
End: 2019-03-27
Payer: MEDICAID

## 2019-03-27 ENCOUNTER — CLINICAL SUPPORT (OUTPATIENT)
Dept: PEDIATRIC CARDIOLOGY | Facility: CLINIC | Age: 34
End: 2019-03-27
Payer: MEDICAID

## 2019-03-27 VITALS
DIASTOLIC BLOOD PRESSURE: 70 MMHG | WEIGHT: 145.06 LBS | BODY MASS INDEX: 25.7 KG/M2 | HEART RATE: 80 BPM | HEIGHT: 63 IN | SYSTOLIC BLOOD PRESSURE: 105 MMHG

## 2019-03-27 DIAGNOSIS — O35.9XX0 SUSPECTED FETAL ABNORMALITY AFFECTING MANAGEMENT OF MOTHER, ANTEPARTUM, SINGLE OR UNSPECIFIED FETUS: Primary | ICD-10-CM

## 2019-03-27 DIAGNOSIS — O35.9XX0 SUSPECTED FETAL ABNORMALITY AFFECTING MANAGEMENT OF MOTHER, ANTEPARTUM, SINGLE OR UNSPECIFIED FETUS: ICD-10-CM

## 2019-03-27 DIAGNOSIS — Z03.73 FETAL ANOMALY SUSPECTED BUT NOT FOUND: ICD-10-CM

## 2019-03-27 DIAGNOSIS — O24.019 TYPE 1 DIABETES MELLITUS COMPLICATING PREGNANCY, ANTEPARTUM: Primary | ICD-10-CM

## 2019-03-27 PROCEDURE — 93325 DOPPLER ECHO COLOR FLOW MAPG: CPT | Mod: PBBFAC | Performed by: PEDIATRICS

## 2019-03-27 PROCEDURE — 93325 DOPPLER ECHO COLOR FLOW MAPG: CPT | Mod: 26,S$PBB,, | Performed by: PEDIATRICS

## 2019-03-27 PROCEDURE — 76825 ECHO EXAM OF FETAL HEART: CPT | Mod: 26,S$PBB,, | Performed by: PEDIATRICS

## 2019-03-27 PROCEDURE — 76825 PR  SO2 FETAL HEART: ICD-10-PCS | Mod: 26,S$PBB,, | Performed by: PEDIATRICS

## 2019-03-27 PROCEDURE — 76827 ECHO EXAM OF FETAL HEART: CPT | Mod: PBBFAC | Performed by: PEDIATRICS

## 2019-03-27 PROCEDURE — 99213 OFFICE O/P EST LOW 20 MIN: CPT | Mod: PBBFAC | Performed by: PEDIATRICS

## 2019-03-27 PROCEDURE — 99213 OFFICE O/P EST LOW 20 MIN: CPT | Mod: 25,S$PBB,, | Performed by: PEDIATRICS

## 2019-03-27 PROCEDURE — 99213 PR OFFICE/OUTPT VISIT, EST, LEVL III, 20-29 MIN: ICD-10-PCS | Mod: 25,S$PBB,, | Performed by: PEDIATRICS

## 2019-03-27 PROCEDURE — 93325 PR DOPPLER COLOR FLOW VELOCITY MAP: ICD-10-PCS | Mod: 26,S$PBB,, | Performed by: PEDIATRICS

## 2019-03-27 PROCEDURE — 76825 ECHO EXAM OF FETAL HEART: CPT | Mod: PBBFAC | Performed by: PEDIATRICS

## 2019-03-27 PROCEDURE — 99999 PR PBB SHADOW E&M-EST. PATIENT-LVL III: CPT | Mod: PBBFAC,,, | Performed by: PEDIATRICS

## 2019-03-27 PROCEDURE — 76827 ECHO EXAM OF FETAL HEART: CPT | Mod: 26,S$PBB,, | Performed by: PEDIATRICS

## 2019-03-27 PROCEDURE — 76827 PR  SO2 FETAL HEART DOPPLER: ICD-10-PCS | Mod: 26,S$PBB,, | Performed by: PEDIATRICS

## 2019-03-27 PROCEDURE — 99999 PR PBB SHADOW E&M-EST. PATIENT-LVL III: ICD-10-PCS | Mod: PBBFAC,,, | Performed by: PEDIATRICS

## 2019-03-27 NOTE — PROGRESS NOTES
"[place]- Pediatric Cardiology Fetal Cardiology Clinic    Today, I had the pleasure of evaluating Tish Landeros who is now 33 y.o. and carrying her fourth pregnancy at 29 3/7 weeks gestation with an ALBERTA of 19. She was referred for evaluation of the fetal heart due a maternal history of type I diabetes mellitus.  She was diagnosed with diabetes 6 years ago.  She was without health insurance during the beginning of the pregnancy, so her sugars were not well controlled at that time.  At my initial visit, I thought there was mild aortic insuffiencey, so I asked her to return today for a follow up study.    She is carrying a male fetus, yet unnamed.      Obstetric History:    .  She has had one miscarriage and two term babies with a different father.  Her OB history is otherwise unremarkable. She sees Dr. Lm aNjera for her OB care.  She has seen Dr. Mckeon for her M care.    Past Medical History:   Diagnosis Date    Diabetes mellitus          Current Outpatient Medications:     aspirin (ECOTRIN) 81 MG EC tablet, Take 81 mg by mouth once daily., Disp: , Rfl:     blood sugar diagnostic (BLOOD GLUCOSE TEST) Strp, 1 strip by Misc.(Non-Drug; Combo Route) route 4 (four) times daily., Disp: 100 strip, Rfl: 5    insulin (BASAGLAR KWIKPEN U-100 INSULIN) glargine 100 units/mL (3mL) SubQ pen, Inject 15 Units into the skin 2 (two) times daily., Disp: 3 mL, Rfl: 5    insulin aspart U-100 (NOVOLOG U-100 INSULIN ASPART) 100 unit/mL injection, 10 units prn elevated glucose, Disp: 10 mL, Rfl: 5    prenatal vit/iron fum/folic ac (PRENATAL 1+1 ORAL), Take by mouth., Disp: , Rfl:     Family History: A paternal aunt  of a "hole in the heart" but no other details were available.  Otherwise, negative for congenital heart disease, early coronary artery disease, sudden unexplained death, connective tissues disorders, genetic syndromes, multiple miscarriages or other congenital anomalies.    Social History:  is " dating the father of the baby.  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    FETAL ECHOCARDIOGRAM (summary):  Fetal echocardiogram at 29 3/7 for a history of pregestational diabetes mellitus  type I and mild fetal aortic insufficiency. ALBERTA 6/9/19.  Normally connected heart.  No ectopy or sustained arrhythmia demonstrated throughout the study.  Normal fetal atrial and ductal level shunting.  No ventricular level shunting.  Normal atrioventricular and semilunar valve structure and function.  There is no aortic valve insufficiency.  Normal ductal and aortic arches.  Normal biventricular size and systolic function.  No pericardial effusion.  (Full report in electronic medical record)    Impression:  Single active male fetus at 29 wga.  Today, I see no aortic insufficiency.  If may be that the previous finding was factitious.  Or it may be that there was some mild intermittent AI that is not present today.  The rest of the left heart is normal.      Todays fetal echocardiogram is normal, within the limitations of fetal echocardiography.  I discussed with her that fetal echocardiography is insufficiently sensitive to rule out all septal defects, anomalies of pulmonary and systemic veins, arch anomalies, and some valvar abnormalities, nor can it ensure that the ductus arteriosus and foramen ovale will spontaneously close.     Recommendations:  Location, timing, and mode of delivery will be determined by the obstetrical team.  She does not require further follow-up in the fetal echocardiography clinic, but I would be happy to see her again if additional questions or concerns arise.    I would like for Ms. Landeros to bring the baby so seem in for a surface echocardiogram sometime in the first few months after delivery to further assess the aortic valve.    The above information was discussed in detail including the use of diagrams, with 30 minutes of total face to face time, with greater than 50% with counseling  and coordination of care.  The discussion of the diagnosis and treatment options is as described above.        Leonid Miranda MD, MPH  Pediatric and Fetal Cardiology  [facility]   [address] [place] [address]    Office: [phone]  Cell: [phone]

## 2019-04-09 PROBLEM — Z36.89 NST (NON-STRESS TEST) REACTIVE: Status: ACTIVE | Noted: 2019-04-09

## 2019-04-11 RX ORDER — PEN NEEDLE, DIABETIC 31 GX5/16"
NEEDLE, DISPOSABLE MISCELLANEOUS
Qty: 100 EACH | Refills: 9 | Status: SHIPPED | OUTPATIENT
Start: 2019-04-11 | End: 2020-01-17 | Stop reason: CLARIF

## 2019-04-16 ENCOUNTER — LAB VISIT (OUTPATIENT)
Dept: LAB | Facility: HOSPITAL | Age: 34
End: 2019-04-16
Attending: OBSTETRICS & GYNECOLOGY
Payer: MEDICAID

## 2019-04-16 DIAGNOSIS — O24.913: ICD-10-CM

## 2019-04-16 PROBLEM — O24.013 PRE-EXISTING TYPE 1 DIABETES MELLITUS DURING PREGNANCY IN THIRD TRIMESTER: Status: ACTIVE | Noted: 2019-04-16

## 2019-04-16 LAB
ESTIMATED AVG GLUCOSE: 183 MG/DL (ref 68–131)
HBA1C MFR BLD HPLC: 8 % (ref 4–5.6)

## 2019-04-16 PROCEDURE — 83036 HEMOGLOBIN GLYCOSYLATED A1C: CPT

## 2019-04-16 PROCEDURE — 36415 COLL VENOUS BLD VENIPUNCTURE: CPT | Mod: PO

## 2019-05-02 ENCOUNTER — ROUTINE PRENATAL (OUTPATIENT)
Dept: OBSTETRICS AND GYNECOLOGY | Facility: CLINIC | Age: 34
End: 2019-05-02
Payer: MEDICAID

## 2019-05-02 VITALS
DIASTOLIC BLOOD PRESSURE: 70 MMHG | SYSTOLIC BLOOD PRESSURE: 116 MMHG | BODY MASS INDEX: 25.85 KG/M2 | WEIGHT: 145.94 LBS

## 2019-05-02 DIAGNOSIS — Z34.83 PRENATAL CARE, SUBSEQUENT PREGNANCY IN THIRD TRIMESTER: Primary | ICD-10-CM

## 2019-05-02 DIAGNOSIS — O24.019 PRE-EXISTING TYPE 1 DIABETES MELLITUS DURING PREGNANCY, ANTEPARTUM: ICD-10-CM

## 2019-05-02 PROCEDURE — 99213 OFFICE O/P EST LOW 20 MIN: CPT | Mod: TH,S$PBB,, | Performed by: OBSTETRICS & GYNECOLOGY

## 2019-05-02 PROCEDURE — 99213 PR OFFICE/OUTPT VISIT, EST, LEVL III, 20-29 MIN: ICD-10-PCS | Mod: TH,S$PBB,, | Performed by: OBSTETRICS & GYNECOLOGY

## 2019-05-02 PROCEDURE — 99212 OFFICE O/P EST SF 10 MIN: CPT | Mod: PBBFAC,TH,PN | Performed by: OBSTETRICS & GYNECOLOGY

## 2019-05-02 PROCEDURE — 99999 PR PBB SHADOW E&M-EST. PATIENT-LVL II: CPT | Mod: PBBFAC,,, | Performed by: OBSTETRICS & GYNECOLOGY

## 2019-05-02 PROCEDURE — 99999 PR PBB SHADOW E&M-EST. PATIENT-LVL II: ICD-10-PCS | Mod: PBBFAC,,, | Performed by: OBSTETRICS & GYNECOLOGY

## 2019-05-03 PROBLEM — O24.919 DIABETES IN PREGNANCY: Status: ACTIVE | Noted: 2019-05-03

## 2019-05-07 ENCOUNTER — TELEPHONE (OUTPATIENT)
Dept: OBSTETRICS AND GYNECOLOGY | Facility: CLINIC | Age: 34
End: 2019-05-07

## 2019-05-07 NOTE — TELEPHONE ENCOUNTER
Prior auth required for basaglar 100 unit inj. Expedited Prior auth initiated on 05/07/19. Called pt to let her know, as soon as we get an answer, we will let her know. Pt has enough to last the next few days and verbalized understanding. (MG)   [facility] [place]- [phone], fax- [phone]

## 2019-05-09 ENCOUNTER — ROUTINE PRENATAL (OUTPATIENT)
Dept: OBSTETRICS AND GYNECOLOGY | Facility: CLINIC | Age: 34
End: 2019-05-09
Payer: MEDICAID

## 2019-05-09 VITALS
DIASTOLIC BLOOD PRESSURE: 70 MMHG | SYSTOLIC BLOOD PRESSURE: 112 MMHG | WEIGHT: 145.94 LBS | BODY MASS INDEX: 25.85 KG/M2

## 2019-05-09 DIAGNOSIS — O24.019 PRE-EXISTING TYPE 1 DIABETES MELLITUS DURING PREGNANCY, ANTEPARTUM: ICD-10-CM

## 2019-05-09 DIAGNOSIS — Z3A.35 35 WEEKS GESTATION OF PREGNANCY: Primary | ICD-10-CM

## 2019-05-09 PROBLEM — Z32.01 POSITIVE PREGNANCY TEST: Status: RESOLVED | Noted: 2019-01-07 | Resolved: 2019-05-09

## 2019-05-09 PROCEDURE — 99999 PR PBB SHADOW E&M-EST. PATIENT-LVL II: ICD-10-PCS | Mod: PBBFAC,,, | Performed by: OBSTETRICS & GYNECOLOGY

## 2019-05-09 PROCEDURE — 59025 FETAL NON-STRESS TEST: CPT | Mod: PBBFAC,PN | Performed by: OBSTETRICS & GYNECOLOGY

## 2019-05-09 PROCEDURE — 59025 PR FETAL 2N-STRESS TEST: ICD-10-PCS | Mod: 26,S$PBB,, | Performed by: OBSTETRICS & GYNECOLOGY

## 2019-05-09 PROCEDURE — 87147 CULTURE TYPE IMMUNOLOGIC: CPT

## 2019-05-09 PROCEDURE — 99999 PR PBB SHADOW E&M-EST. PATIENT-LVL II: CPT | Mod: PBBFAC,,, | Performed by: OBSTETRICS & GYNECOLOGY

## 2019-05-09 PROCEDURE — 99213 OFFICE O/P EST LOW 20 MIN: CPT | Mod: TH,25,S$PBB, | Performed by: OBSTETRICS & GYNECOLOGY

## 2019-05-09 PROCEDURE — 99213 PR OFFICE/OUTPT VISIT, EST, LEVL III, 20-29 MIN: ICD-10-PCS | Mod: TH,25,S$PBB, | Performed by: OBSTETRICS & GYNECOLOGY

## 2019-05-09 PROCEDURE — 99212 OFFICE O/P EST SF 10 MIN: CPT | Mod: PBBFAC,TH,PN,25 | Performed by: OBSTETRICS & GYNECOLOGY

## 2019-05-09 PROCEDURE — 87081 CULTURE SCREEN ONLY: CPT

## 2019-05-09 PROCEDURE — 59025 FETAL NON-STRESS TEST: CPT | Mod: 26,S$PBB,, | Performed by: OBSTETRICS & GYNECOLOGY

## 2019-05-09 PROCEDURE — 87184 SC STD DISK METHOD PER PLATE: CPT

## 2019-05-09 NOTE — PROGRESS NOTES
GBS cultures done today. Induction to be on 05/22/19 per patient request.  FETAL SURVEILLANCE TESTING SUMMARY  INDICATIONS:  diabetes mellitus  Fetal doppler heart rate tracing obtained in the usual fashion with the patient in the left supine position.  Duration of monitoring: 10 min    OBJECTIVE RESULTS:  Baseline fetal heart rate: 145bpm  Fetal heart variability: marked  Fetal Heart Rate decelerations: none  Fetal Heart Rate accelerations: yes  Baseline FHR: 145 per minute  Fetal Non-stress Test: reactive    Fetal surveillance: reassuring    Lm Pool M.D., FACOG

## 2019-05-14 LAB — BACTERIA SPEC AEROBE CULT: NORMAL

## 2019-05-16 ENCOUNTER — ROUTINE PRENATAL (OUTPATIENT)
Dept: OBSTETRICS AND GYNECOLOGY | Facility: CLINIC | Age: 34
End: 2019-05-16
Payer: MEDICAID

## 2019-05-16 VITALS — SYSTOLIC BLOOD PRESSURE: 114 MMHG | WEIGHT: 145.5 LBS | DIASTOLIC BLOOD PRESSURE: 62 MMHG | BODY MASS INDEX: 25.77 KG/M2

## 2019-05-16 DIAGNOSIS — O24.019 PRE-EXISTING TYPE 1 DIABETES MELLITUS DURING PREGNANCY, ANTEPARTUM: ICD-10-CM

## 2019-05-16 DIAGNOSIS — Z3A.36 36 WEEKS GESTATION OF PREGNANCY: Primary | ICD-10-CM

## 2019-05-16 LAB
BILIRUB SERPL-MCNC: NEGATIVE MG/DL
BLOOD URINE, POC: NEGATIVE
COLOR, POC UA: NORMAL
GLUCOSE UR QL STRIP: NORMAL
KETONES UR QL STRIP: NEGATIVE
LEUKOCYTE ESTERASE URINE, POC: NORMAL
NITRITE, POC UA: NEGATIVE
PH, POC UA: 7
PROTEIN, POC: NORMAL
SPECIFIC GRAVITY, POC UA: NORMAL
UROBILINOGEN, POC UA: NORMAL

## 2019-05-16 PROCEDURE — 99213 PR OFFICE/OUTPT VISIT, EST, LEVL III, 20-29 MIN: ICD-10-PCS | Mod: 25,TH,S$PBB, | Performed by: OBSTETRICS & GYNECOLOGY

## 2019-05-16 PROCEDURE — 59025 FETAL NON-STRESS TEST: CPT | Mod: PBBFAC,PN | Performed by: OBSTETRICS & GYNECOLOGY

## 2019-05-16 PROCEDURE — 59025 PR FETAL 2N-STRESS TEST: ICD-10-PCS | Mod: 26,S$PBB,, | Performed by: OBSTETRICS & GYNECOLOGY

## 2019-05-16 PROCEDURE — 59025 FETAL NON-STRESS TEST: CPT | Mod: 26,S$PBB,, | Performed by: OBSTETRICS & GYNECOLOGY

## 2019-05-16 PROCEDURE — 99999 PR PBB SHADOW E&M-EST. PATIENT-LVL II: ICD-10-PCS | Mod: PBBFAC,,, | Performed by: OBSTETRICS & GYNECOLOGY

## 2019-05-16 PROCEDURE — 81002 URINALYSIS NONAUTO W/O SCOPE: CPT | Mod: PBBFAC,PN | Performed by: OBSTETRICS & GYNECOLOGY

## 2019-05-16 PROCEDURE — 99213 OFFICE O/P EST LOW 20 MIN: CPT | Mod: 25,TH,S$PBB, | Performed by: OBSTETRICS & GYNECOLOGY

## 2019-05-16 PROCEDURE — 99212 OFFICE O/P EST SF 10 MIN: CPT | Mod: PBBFAC,TH,PN | Performed by: OBSTETRICS & GYNECOLOGY

## 2019-05-16 PROCEDURE — 99999 PR PBB SHADOW E&M-EST. PATIENT-LVL II: CPT | Mod: PBBFAC,,, | Performed by: OBSTETRICS & GYNECOLOGY

## 2019-05-16 NOTE — PROGRESS NOTES
Patient reports no concerns today  FETAL SURVEILLANCE TESTING SUMMARY      INDICATIONS:  diabetes mellitus    Fetal doppler heart rate tracing obtained in the usual fashion with the patient in the left supine position.    Duration of monitoring: 10min    OBJECTIVE RESULTS:  Baseline fetal heart rate: 135bpm    Fetal heart variability: marked  Fetal Heart Rate decelerations: none  Fetal Heart Rate accelerations: yes  Fetal Non-stress Test: reactive    Fetal surveillance: reassuring      Lm Pool M.D., FACOG

## 2019-05-21 ENCOUNTER — ROUTINE PRENATAL (OUTPATIENT)
Dept: OBSTETRICS AND GYNECOLOGY | Facility: CLINIC | Age: 34
End: 2019-05-21
Payer: MEDICAID

## 2019-05-21 VITALS — WEIGHT: 149.69 LBS | DIASTOLIC BLOOD PRESSURE: 72 MMHG | SYSTOLIC BLOOD PRESSURE: 98 MMHG | BODY MASS INDEX: 26.52 KG/M2

## 2019-05-21 DIAGNOSIS — O24.019 PRE-EXISTING TYPE 1 DIABETES MELLITUS DURING PREGNANCY, ANTEPARTUM: Primary | ICD-10-CM

## 2019-05-21 PROCEDURE — 99999 PR PBB SHADOW E&M-EST. PATIENT-LVL II: CPT | Mod: PBBFAC,,, | Performed by: OBSTETRICS & GYNECOLOGY

## 2019-05-21 PROCEDURE — 99213 OFFICE O/P EST LOW 20 MIN: CPT | Mod: TH,S$PBB,, | Performed by: OBSTETRICS & GYNECOLOGY

## 2019-05-21 PROCEDURE — 99213 PR OFFICE/OUTPT VISIT, EST, LEVL III, 20-29 MIN: ICD-10-PCS | Mod: TH,S$PBB,, | Performed by: OBSTETRICS & GYNECOLOGY

## 2019-05-21 PROCEDURE — 99212 OFFICE O/P EST SF 10 MIN: CPT | Mod: PBBFAC,TH,PN | Performed by: OBSTETRICS & GYNECOLOGY

## 2019-05-21 PROCEDURE — 99999 PR PBB SHADOW E&M-EST. PATIENT-LVL II: ICD-10-PCS | Mod: PBBFAC,,, | Performed by: OBSTETRICS & GYNECOLOGY

## 2019-05-21 NOTE — PROGRESS NOTES
Patient with no concerns. Patient to get cytotec this PM for induction in AM.  FETAL SURVEILLANCE TESTING SUMMARY  INDICATIONS:  diabetes mellitus    Fetal doppler heart rate tracing obtained in the usual fashion with the patient in the left supine position.    Duration of monitoring: 15 min    OBJECTIVE RESULTS:  Baseline fetal heart rate: 130 bpm    Fetal heart variability: marked  Fetal Heart Rate decelerations: none  Fetal Heart Rate accelerations: yes  Fetal Non-stress Test: reactive    Fetal surveillance: reassuring    Lm Pool M.D., FACOG

## 2019-05-24 PROBLEM — E10.9 DIABETES TYPE 1, CONTROLLED: Status: ACTIVE | Noted: 2019-05-24

## 2019-05-26 PROBLEM — O24.919 DIABETES MELLITUS COMPLICATING PREGNANCY: Status: ACTIVE | Noted: 2019-05-26

## 2019-05-30 ENCOUNTER — TELEPHONE (OUTPATIENT)
Dept: OBSTETRICS AND GYNECOLOGY | Facility: CLINIC | Age: 34
End: 2019-05-30

## 2019-05-30 NOTE — TELEPHONE ENCOUNTER
Patient delivered baby 5/27/19 and is requesting a stronger pain medication as ibuprofen 600mg isn't giving much relief. Patient states she was also getting Percocets while in the hospital, but didn't think she would need it coming home at the time. Please advise.

## 2019-05-30 NOTE — TELEPHONE ENCOUNTER
----- Message from Luis Angel Houston sent at 5/30/2019 10:59 AM CDT -----  Type: Needs Medical Advice    Who Called:  Patient  Best Call Back Number: [phone] (home)   Additional Information: Would like to speak to someone in the office about getting pain medication. Please call to advise.

## 2019-05-30 NOTE — TELEPHONE ENCOUNTER
Patient advised Dr. Pool is unable to prescribed narcotic pain relief. Advised to try heating pad, warm soaks, plenty of rest and continue ibuprofen.    Patient scheduled for postpartum visit. Patient requested 3 weeks from delivery.    No further assistance needed.

## 2019-07-02 ENCOUNTER — POSTPARTUM VISIT (OUTPATIENT)
Dept: OBSTETRICS AND GYNECOLOGY | Facility: CLINIC | Age: 34
End: 2019-07-02
Payer: MEDICAID

## 2019-07-02 VITALS — WEIGHT: 132.5 LBS | BODY MASS INDEX: 23.47 KG/M2 | SYSTOLIC BLOOD PRESSURE: 120 MMHG | DIASTOLIC BLOOD PRESSURE: 72 MMHG

## 2019-07-02 DIAGNOSIS — Z30.013 ENCOUNTER FOR INITIAL PRESCRIPTION OF INJECTABLE CONTRACEPTIVE: ICD-10-CM

## 2019-07-02 PROBLEM — O24.013 PRE-EXISTING TYPE 1 DIABETES MELLITUS DURING PREGNANCY IN THIRD TRIMESTER: Status: RESOLVED | Noted: 2019-04-16 | Resolved: 2019-07-02

## 2019-07-02 PROBLEM — O24.019 TYPE 1 DIABETES MELLITUS COMPLICATING PREGNANCY, ANTEPARTUM: Status: RESOLVED | Noted: 2019-01-07 | Resolved: 2019-07-02

## 2019-07-02 PROBLEM — O24.919 DIABETES MELLITUS COMPLICATING PREGNANCY: Status: RESOLVED | Noted: 2019-05-26 | Resolved: 2019-07-02

## 2019-07-02 PROBLEM — O24.919 DIABETES IN PREGNANCY: Status: RESOLVED | Noted: 2019-05-03 | Resolved: 2019-07-02

## 2019-07-02 PROBLEM — N89.8 VAGINAL DISCHARGE DURING PREGNANCY IN SECOND TRIMESTER: Status: RESOLVED | Noted: 2019-01-09 | Resolved: 2019-07-02

## 2019-07-02 PROBLEM — O26.892 VAGINAL DISCHARGE DURING PREGNANCY IN SECOND TRIMESTER: Status: RESOLVED | Noted: 2019-01-09 | Resolved: 2019-07-02

## 2019-07-02 PROCEDURE — 99999 PR PBB SHADOW E&M-EST. PATIENT-LVL III: CPT | Mod: PBBFAC,,, | Performed by: OBSTETRICS & GYNECOLOGY

## 2019-07-02 PROCEDURE — 0503F PR POSTPARTUM CARE VISIT: ICD-10-PCS | Mod: TH,,, | Performed by: OBSTETRICS & GYNECOLOGY

## 2019-07-02 PROCEDURE — 0503F POSTPARTUM CARE VISIT: CPT | Mod: TH,,, | Performed by: OBSTETRICS & GYNECOLOGY

## 2019-07-02 PROCEDURE — 99213 OFFICE O/P EST LOW 20 MIN: CPT | Mod: PBBFAC,PN | Performed by: OBSTETRICS & GYNECOLOGY

## 2019-07-02 PROCEDURE — 99999 PR PBB SHADOW E&M-EST. PATIENT-LVL III: ICD-10-PCS | Mod: PBBFAC,,, | Performed by: OBSTETRICS & GYNECOLOGY

## 2019-07-02 RX ORDER — MEDROXYPROGESTERONE ACETATE 150 MG/ML
150 INJECTION, SUSPENSION INTRAMUSCULAR
Qty: 1 ML | Refills: 3 | Status: SHIPPED | OUTPATIENT
Start: 2019-07-02 | End: 2020-01-17 | Stop reason: ALTCHOICE

## 2019-07-02 NOTE — PROGRESS NOTES
Subjective:       Patient ID: Tish Landeros is a 33 y.o. female.    Chief Complaint:  Postpartum Care      History of Present Illness  HPI  Postoperative Follow-up  Patient presents to the clinic 5 weeks status post spontaneous vaginal delivery of a viable infant.  Eating a regular diet without difficulty. Bowel movements are normal. The patient is not having any pain.Patient was breast feeding till 2 weeks ago. Patient requests Depo Provera for contraception.      GYN & OB History  Patient's last menstrual period was 09/10/2018 (approximate).   Date of Last Pap: 2019    OB History    Para Term  AB Living   4 3 3 0 1 3   SAB TAB Ectopic Multiple Live Births   1 0 0 0 3      # Outcome Date GA Lbr Carlton/2nd Weight Sex Delivery Anes PTL Lv   4 Term 19 38w1d 02:09  00:17  M Vag-Spont EPI N KARTHIKEYAN      Complications: Diabetes in pregnancy   3 Term  42w0d  3.189 kg (7 lb 0.5 oz) M Vag-Spont EPI  KARTHIKEYAN   2 Term  40w0d  3.856 kg (8 lb 8 oz) F Vag-Spont None  KARTHIKEYAN   1 SAB                Review of Systems  Review of Systems   Constitutional: Negative for activity change, appetite change, chills, diaphoresis, fatigue, fever and unexpected weight change.   HENT: Negative for nasal congestion, mouth sores and tinnitus.    Eyes: Negative for discharge and visual disturbance.   Respiratory: Negative for cough, shortness of breath and wheezing.    Cardiovascular: Negative for chest pain, palpitations and leg swelling.   Gastrointestinal: Negative for abdominal pain, bloating, blood in stool, constipation, diarrhea, nausea, vomiting, reflux and fecal incontinence.   Endocrine: Positive for diabetes. Negative for hair loss, hot flashes, hyperthyroidism and hypothyroidism.   Genitourinary: Negative for bladder incontinence, decreased libido, dysmenorrhea, dyspareunia, dysuria, flank pain, frequency, genital sores, hematuria, hot flashes, menorrhagia, menstrual problem, pelvic pain, urgency, vaginal  bleeding, vaginal discharge, vaginal pain, urinary incontinence, postcoital bleeding, postmenopausal bleeding, vaginal dryness and vaginal odor.   Musculoskeletal: Negative for arthralgias, back pain, joint swelling, leg pain and myalgias.   Integumentary:  Negative for rash, acne, hair changes, mole/lesion, breast mass, nipple discharge, breast skin changes and breast tenderness.   Neurological: Negative for vertigo, seizures, syncope, numbness and headaches.   Hematological: Negative for adenopathy. Does not bruise/bleed easily.   Psychiatric/Behavioral: Negative for depression and sleep disturbance. The patient is not nervous/anxious.    Breast: Negative for asymmetry, breast self exam, lump, mass, mastodynia, nipple discharge, skin changes and tenderness          Objective:    Physical Exam:   Constitutional: She is oriented to person, place, and time. She appears well-developed and well-nourished. No distress.    HENT:   Head: Normocephalic.   Nose: No epistaxis.    Eyes: Pupils are equal, round, and reactive to light.    Neck: Normal range of motion.    Cardiovascular: Normal rate.     Pulmonary/Chest: Effort normal.        Abdominal: Soft. She exhibits no distension. There is no tenderness.     Genitourinary: Vagina normal and uterus normal.           Musculoskeletal: Normal range of motion and moves all extremeties.       Neurological: She is alert and oriented to person, place, and time.    Skin: Skin is warm and dry. She is not diaphoretic.    Psychiatric: She has a normal mood and affect. Her behavior is normal. Judgment and thought content normal.          Assessment:        1. Postpartum care following vaginal delivery    2. Encounter for initial prescription of injectable contraceptive                Plan:      Depo Provera 150 mg IM at onset of next menses

## 2019-07-08 ENCOUNTER — TELEPHONE (OUTPATIENT)
Dept: OBSTETRICS AND GYNECOLOGY | Facility: CLINIC | Age: 34
End: 2019-07-08

## 2019-07-08 NOTE — TELEPHONE ENCOUNTER
----- Message from Emilee Medrano sent at 7/8/2019  1:50 PM CDT -----  Contact: pt  Pt states needs a refill on her insulin aspart U-100 (NOVOLOG U-100 INSULIN ASPART) 100 unit/mL injection.insulin (will be out soon),(BASAGLAR KWIKPEN U-100 INSULIN) glargine 100 units/mL (3mL) SubQ pen(pt is out of) and also needing the pen needles to go along with them,today please ...[phone] (home)           .  [facility] [address] [place] [id number] [address]  [address] [facility] [address]  Phone: [phone] Fax: [phone]

## 2019-09-16 ENCOUNTER — OFFICE VISIT (OUTPATIENT)
Dept: OBSTETRICS AND GYNECOLOGY | Facility: CLINIC | Age: 34
End: 2019-09-16
Payer: MEDICAID

## 2019-09-16 VITALS
SYSTOLIC BLOOD PRESSURE: 106 MMHG | DIASTOLIC BLOOD PRESSURE: 62 MMHG | WEIGHT: 126.13 LBS | BODY MASS INDEX: 22.35 KG/M2 | HEIGHT: 63 IN

## 2019-09-16 DIAGNOSIS — O24.012 PRE-EXISTING TYPE 1 DIABETES MELLITUS DURING PREGNANCY IN SECOND TRIMESTER: ICD-10-CM

## 2019-09-16 DIAGNOSIS — N91.0 DELAYED MENSES: Primary | ICD-10-CM

## 2019-09-16 LAB
B-HCG UR QL: NEGATIVE
CTP QC/QA: YES

## 2019-09-16 PROCEDURE — 99999 PR PBB SHADOW E&M-EST. PATIENT-LVL III: CPT | Mod: PBBFAC,,, | Performed by: SPECIALIST

## 2019-09-16 PROCEDURE — 99999 PR PBB SHADOW E&M-EST. PATIENT-LVL III: ICD-10-PCS | Mod: PBBFAC,,, | Performed by: SPECIALIST

## 2019-09-16 PROCEDURE — 99213 OFFICE O/P EST LOW 20 MIN: CPT | Mod: PBBFAC,TH,PN | Performed by: SPECIALIST

## 2019-09-16 PROCEDURE — 99213 OFFICE O/P EST LOW 20 MIN: CPT | Mod: S$PBB,,, | Performed by: SPECIALIST

## 2019-09-16 PROCEDURE — 99213 PR OFFICE/OUTPT VISIT, EST, LEVL III, 20-29 MIN: ICD-10-PCS | Mod: S$PBB,,, | Performed by: SPECIALIST

## 2019-09-16 PROCEDURE — 81025 URINE PREGNANCY TEST: CPT | Mod: PBBFAC,PN | Performed by: SPECIALIST

## 2019-09-16 RX ORDER — INSULIN GLARGINE 100 [IU]/ML
18 INJECTION, SOLUTION SUBCUTANEOUS 2 TIMES DAILY
Qty: 10.8 ML | Refills: 11 | Status: SHIPPED | OUTPATIENT
Start: 2019-09-16 | End: 2020-09-03

## 2019-09-16 RX ORDER — INSULIN GLARGINE 100 [IU]/ML
18 INJECTION, SOLUTION SUBCUTANEOUS 2 TIMES DAILY
Start: 2019-09-16 | End: 2019-09-16 | Stop reason: SDUPTHER

## 2019-09-16 RX ORDER — MEDROXYPROGESTERONE ACETATE 10 MG/1
10 TABLET ORAL 2 TIMES DAILY
Qty: 10 TABLET | Refills: 0 | Status: SHIPPED | OUTPATIENT
Start: 2019-09-16 | End: 2019-09-21

## 2019-09-16 NOTE — TELEPHONE ENCOUNTER
----- Message from Yue Chapa sent at 9/16/2019 12:55 PM CDT -----  Contact: patient  Type: Needs Medical Advice    Who Called:  patient  Best Call Back Number: [phone]  Additional Information: patient states that her medication has not been sent to the pharmacy. Please give call back

## 2019-09-16 NOTE — PROGRESS NOTES
34 yo WF presents for evaluation amenorrhea since delivery of baby May. Pt also complains diarrhea.  UPT Neg.  Pt denies vaginal spotting,d/c, n/v, dysuria, hematuria.  Past Medical History:   Diagnosis Date    Abnormal Pap smear of cervix     Diabetes in pregnancy 5/3/2019    Diabetes mellitus     diagnosed 6years ago.     Diabetes mellitus complicating pregnancy 2019    HPV in female     Pre-existing type 1 diabetes mellitus during pregnancy in third trimester 2019    Type 1 diabetes mellitus complicating pregnancy, antepartum 2019       Past Surgical History:   Procedure Laterality Date    CERVICAL BIOPSY  W/ LOOP ELECTRODE EXCISION         Family History   Problem Relation Age of Onset    Diabetes Father         type 1    Hypertension Mother     Heart disease Mother         stent    Congenital heart disease Paternal Aunt          of hole in heart    Diabetes Sister         Juvenile DM    Arrhythmia Neg Hx     Cardiomyopathy Neg Hx     Pacemaker/defibrilator Neg Hx     Heart attacks under age 50 Neg Hx     Breast cancer Neg Hx     Ovarian cancer Neg Hx        Social History     Socioeconomic History    Marital status:      Spouse name: Not on file    Number of children: Not on file    Years of education: Not on file    Highest education level: Not on file   Occupational History    Not on file   Social Needs    Financial resource strain: Not on file    Food insecurity:     Worry: Not on file     Inability: Not on file    Transportation needs:     Medical: Not on file     Non-medical: Not on file   Tobacco Use    Smoking status: Current Every Day Smoker     Packs/day: 0.50     Years: 10.00     Pack years: 5.00    Smokeless tobacco: Never Used   Substance and Sexual Activity    Alcohol use: Yes     Frequency: Never    Drug use: No    Sexual activity: Yes     Partners: Male     Birth control/protection: None   Lifestyle    Physical activity:     Days per  "week: Not on file     Minutes per session: Not on file    Stress: Not on file   Relationships    Social connections:     Talks on phone: Not on file     Gets together: Not on file     Attends [employer] service: Not on file     Active member of club or organization: Not on file     Attends meetings of clubs or organizations: Not on file     Relationship status: Not on file   Other Topics Concern    Not on file   Social History Narrative    Not on file       Current Outpatient Medications   Medication Sig Dispense Refill    BD ULTRA-FINE MINI PEN NEEDLE 31 gauge x 3/16" Ndle USE 4-6 TIMES A  each 9    blood sugar diagnostic (BLOOD GLUCOSE TEST) Strp 1 strip by Misc.(Non-Drug; Combo Route) route 4 (four) times daily. 100 strip 5    insulin (BASAGLAR KWIKPEN U-100 INSULIN) glargine 100 units/mL (3mL) SubQ pen Inject 18 Units into the skin 2 (two) times daily.      insulin aspart U-100 (NOVOLOG U-100 INSULIN ASPART) 100 unit/mL injection 10 units prn elevated glucose 10 mL 5    aspirin (ECOTRIN) 81 MG EC tablet Take 81 mg by mouth once daily.      calcium carbonate (OS-RHONDA) 500 mg calcium (1,250 mg) chewable tablet Take 1 tablet by mouth once daily.      ibuprofen (ADVIL,MOTRIN) 600 MG tablet Take 1 tablet (600 mg total) by mouth every 6 (six) hours as needed for Pain. 20 tablet 3    medroxyPROGESTERone (DEPO-PROVERA) 150 mg/mL Syrg Inject 1 mL (150 mg total) into the muscle every 3 (three) months. 1 mL 3    medroxyPROGESTERone (PROVERA) 10 MG tablet Take 1 tablet (10 mg total) by mouth 2 (two) times daily. for 5 days 10 tablet 0    prenatal vit/iron fum/folic ac (PRENATAL 1+1 ORAL) Take by mouth.       No current facility-administered medications for this visit.        Review of patient's allergies indicates:   Allergen Reactions    Ciprofloxacin        Review of System:   General: no chills, fever, night sweats, weight gain or weight loss  Psychological: no depression or suicidal " ideation  Breasts: no new or changing breast lumps, nipple discharge or masses.  Respiratory: no cough, shortness of breath, or wheezing  Cardiovascular: no chest pain or dyspnea on exertion  Gastrointestinal: no abdominal pain, change in bowel habits, or black or bloody stools  Genito-Urinary: no incontinence, urinary frequency/urgency or vulvar/vaginal symptoms, pelvic pain. POS SECONDARY AMENORRHEA  Musculoskeletal: no gait disturbance or muscular weakness    I had a 20 min discussion with pt regarding secondary amenorrhea and possible etiologies. I rec Provera challenge test and pt is to report back to me.  In addition, discussed GI symptoms and rec GI referrel for possible IBS  Answered all questions and pt will begin Provera and contact me for menses w/i 2 weeks and is instructed to notify me if dos not have withdrawal bleed.  At the end of patient visit, nurse and MD both asked pt if any improvements needed in visit experience and asked whether any further pt questions or concerns.

## 2019-09-17 DIAGNOSIS — O24.019 TYPE 1 DIABETES MELLITUS COMPLICATING PREGNANCY, ANTEPARTUM: ICD-10-CM

## 2019-09-17 RX ORDER — INSULIN ASPART 100 [IU]/ML
INJECTION, SOLUTION INTRAVENOUS; SUBCUTANEOUS
Qty: 10 ML | Refills: 5 | Status: SHIPPED | OUTPATIENT
Start: 2019-09-17 | End: 2019-09-18 | Stop reason: SDUPTHER

## 2019-09-17 NOTE — TELEPHONE ENCOUNTER
----- Message from Tk Avalos sent at 9/17/2019  3:05 PM CDT -----  Type: Needs Medical Advice    Who Called:  Patient    Pharmacy name and phone #:    [facility] - [place] - [address]  [id number]  [facility] [address]  Phone: [phone] Fax: [phone]      Best Call Back Number: [phone]  Additional Information: Patient states that her prescription for insulin aspart U-100 (NOVOLOG U-100 INSULIN ASPART) 100 unit/mL injection did not go through to the pharmacy.  Please call to advise

## 2019-09-18 ENCOUNTER — TELEPHONE (OUTPATIENT)
Dept: OBSTETRICS AND GYNECOLOGY | Facility: CLINIC | Age: 34
End: 2019-09-18

## 2019-09-18 DIAGNOSIS — O24.019 TYPE 1 DIABETES MELLITUS COMPLICATING PREGNANCY, ANTEPARTUM: ICD-10-CM

## 2019-09-18 RX ORDER — INSULIN ASPART 100 [IU]/ML
INJECTION, SOLUTION INTRAVENOUS; SUBCUTANEOUS
Qty: 10 ML | Refills: 5 | Status: SHIPPED | OUTPATIENT
Start: 2019-09-18 | End: 2020-09-17

## 2019-09-18 NOTE — TELEPHONE ENCOUNTER
Please resend a new script for the Novolog with directions and max daily dose. Patient states that she is taking about 10 units with every meal plus sliding scale.

## 2019-09-18 NOTE — TELEPHONE ENCOUNTER
----- Message from Paty Luevano sent at 9/18/2019  2:20 PM CDT -----  Contact: Patient  Type: Needs Medical Advice    Who Called:  Patient  Pharmacy name and phone #:    [facility] - [place] - [address]  [id number]  [place] [address]  Phone: [phone] Fax: [phone]      Best Call Back Number: [phone]  Additional Information: Patient is stating that she is out of her Novalog and she has been trying to get this completed.Please call back and advise.

## 2019-09-23 ENCOUNTER — TELEPHONE (OUTPATIENT)
Dept: OBSTETRICS AND GYNECOLOGY | Facility: CLINIC | Age: 34
End: 2019-09-23

## 2019-09-23 NOTE — TELEPHONE ENCOUNTER
Pt states that Novolog vial was called into [facility]; and it is supposed to be the Novolog flexpen. Please advise*

## 2020-01-17 ENCOUNTER — HOSPITAL ENCOUNTER (INPATIENT)
Facility: HOSPITAL | Age: 35
LOS: 5 days | Discharge: LEFT BEFORE DISCHARGE | DRG: 871 | End: 2020-01-22
Attending: EMERGENCY MEDICINE | Admitting: INTERNAL MEDICINE
Payer: MEDICAID

## 2020-01-17 DIAGNOSIS — N12 PYELONEPHRITIS: Primary | ICD-10-CM

## 2020-01-17 DIAGNOSIS — R11.2 NAUSEA AND VOMITING, INTRACTABILITY OF VOMITING NOT SPECIFIED, UNSPECIFIED VOMITING TYPE: ICD-10-CM

## 2020-01-17 PROBLEM — A41.9 SEPSIS: Status: ACTIVE | Noted: 2020-01-17

## 2020-01-17 LAB
ALBUMIN SERPL BCP-MCNC: 3.5 G/DL (ref 3.5–5.2)
ALP SERPL-CCNC: 89 U/L (ref 55–135)
ALT SERPL W/O P-5'-P-CCNC: 18 U/L (ref 10–44)
ANION GAP SERPL CALC-SCNC: 17 MMOL/L (ref 8–16)
ANION GAP SERPL CALC-SCNC: 25 MMOL/L (ref 8–16)
AST SERPL-CCNC: 13 U/L (ref 10–40)
B-HCG UR QL: NEGATIVE
B-OH-BUTYR BLD STRIP-SCNC: 5.9 MMOL/L (ref 0–0.5)
BACTERIA #/AREA URNS HPF: ABNORMAL /HPF
BASOPHILS # BLD AUTO: ABNORMAL K/UL (ref 0–0.2)
BASOPHILS NFR BLD: 0 % (ref 0–1.9)
BILIRUB SERPL-MCNC: 0.8 MG/DL (ref 0.1–1)
BILIRUB UR QL STRIP: ABNORMAL
BUN SERPL-MCNC: 11 MG/DL (ref 6–20)
BUN SERPL-MCNC: 14 MG/DL (ref 6–20)
CALCIUM SERPL-MCNC: 9.1 MG/DL (ref 8.7–10.5)
CALCIUM SERPL-MCNC: 9.7 MG/DL (ref 8.7–10.5)
CHLORIDE SERPL-SCNC: 95 MMOL/L (ref 95–110)
CHLORIDE SERPL-SCNC: 97 MMOL/L (ref 95–110)
CLARITY UR: CLEAR
CO2 SERPL-SCNC: 11 MMOL/L (ref 23–29)
CO2 SERPL-SCNC: 23 MMOL/L (ref 23–29)
COLOR UR: YELLOW
CREAT SERPL-MCNC: 1.2 MG/DL (ref 0.5–1.4)
CREAT SERPL-MCNC: 1.5 MG/DL (ref 0.5–1.4)
CTP QC/QA: YES
DIFFERENTIAL METHOD: ABNORMAL
EOSINOPHIL # BLD AUTO: ABNORMAL K/UL (ref 0–0.5)
EOSINOPHIL NFR BLD: 0 % (ref 0–8)
ERYTHROCYTE [DISTWIDTH] IN BLOOD BY AUTOMATED COUNT: 12 % (ref 11.5–14.5)
EST. GFR  (AFRICAN AMERICAN): 52 ML/MIN/1.73 M^2
EST. GFR  (AFRICAN AMERICAN): >60 ML/MIN/1.73 M^2
EST. GFR  (NON AFRICAN AMERICAN): 45 ML/MIN/1.73 M^2
EST. GFR  (NON AFRICAN AMERICAN): 59 ML/MIN/1.73 M^2
GLUCOSE SERPL-MCNC: 418 MG/DL (ref 70–110)
GLUCOSE SERPL-MCNC: 699 MG/DL (ref 70–110)
GLUCOSE UR QL STRIP: ABNORMAL
HCT VFR BLD AUTO: 39.8 % (ref 37–48.5)
HGB BLD-MCNC: 13.3 G/DL (ref 12–16)
HGB UR QL STRIP: ABNORMAL
IMM GRANULOCYTES # BLD AUTO: ABNORMAL K/UL (ref 0–0.04)
INFLUENZA A, MOLECULAR: NEGATIVE
INFLUENZA B, MOLECULAR: NEGATIVE
KETONES UR QL STRIP: ABNORMAL
LACTATE SERPL-SCNC: 2.5 MMOL/L (ref 0.5–2.2)
LACTATE SERPL-SCNC: 3 MMOL/L (ref 0.5–2.2)
LEUKOCYTE ESTERASE UR QL STRIP: NEGATIVE
LIPASE SERPL-CCNC: <3 U/L (ref 4–60)
LYMPHOCYTES # BLD AUTO: ABNORMAL K/UL (ref 1–4.8)
LYMPHOCYTES NFR BLD: 2 % (ref 18–48)
MCH RBC QN AUTO: 31.6 PG (ref 27–31)
MCHC RBC AUTO-ENTMCNC: 33.4 G/DL (ref 32–36)
MCV RBC AUTO: 95 FL (ref 82–98)
MICROSCOPIC COMMENT: ABNORMAL
MONOCYTES # BLD AUTO: ABNORMAL K/UL (ref 0.3–1)
MONOCYTES NFR BLD: 5 % (ref 4–15)
NEUTROPHILS NFR BLD: 87 % (ref 38–73)
NEUTS BAND NFR BLD MANUAL: 6 %
NITRITE UR QL STRIP: NEGATIVE
NRBC BLD-RTO: 0 /100 WBC
PH UR STRIP: 6 [PH] (ref 5–8)
PLATELET # BLD AUTO: 185 K/UL (ref 150–350)
PLATELET BLD QL SMEAR: ABNORMAL
PMV BLD AUTO: 10.5 FL (ref 9.2–12.9)
POCT GLUCOSE: 366 MG/DL (ref 70–110)
POTASSIUM SERPL-SCNC: 4.9 MMOL/L (ref 3.5–5.1)
POTASSIUM SERPL-SCNC: 5.4 MMOL/L (ref 3.5–5.1)
PROT SERPL-MCNC: 7.2 G/DL (ref 6–8.4)
PROT UR QL STRIP: NEGATIVE
RBC # BLD AUTO: 4.21 M/UL (ref 4–5.4)
RBC #/AREA URNS HPF: 4 /HPF (ref 0–4)
SODIUM SERPL-SCNC: 133 MMOL/L (ref 136–145)
SODIUM SERPL-SCNC: 135 MMOL/L (ref 136–145)
SP GR UR STRIP: 1.02 (ref 1–1.03)
SPECIMEN SOURCE: NORMAL
SQUAMOUS #/AREA URNS HPF: 14 /HPF
URATE CRY URNS QL MICRO: ABNORMAL
URN SPEC COLLECT METH UR: ABNORMAL
UROBILINOGEN UR STRIP-ACNC: NEGATIVE EU/DL
WBC # BLD AUTO: 17.01 K/UL (ref 3.9–12.7)
WBC #/AREA URNS HPF: >100 /HPF (ref 0–5)
YEAST URNS QL MICRO: ABNORMAL

## 2020-01-17 PROCEDURE — 63600175 PHARM REV CODE 636 W HCPCS: Performed by: NURSE PRACTITIONER

## 2020-01-17 PROCEDURE — 99285 EMERGENCY DEPT VISIT HI MDM: CPT | Mod: 25

## 2020-01-17 PROCEDURE — 96365 THER/PROPH/DIAG IV INF INIT: CPT

## 2020-01-17 PROCEDURE — 81000 URINALYSIS NONAUTO W/SCOPE: CPT

## 2020-01-17 PROCEDURE — 81025 URINE PREGNANCY TEST: CPT | Performed by: PHYSICIAN ASSISTANT

## 2020-01-17 PROCEDURE — 85027 COMPLETE CBC AUTOMATED: CPT

## 2020-01-17 PROCEDURE — 36415 COLL VENOUS BLD VENIPUNCTURE: CPT

## 2020-01-17 PROCEDURE — 83036 HEMOGLOBIN GLYCOSYLATED A1C: CPT

## 2020-01-17 PROCEDURE — 87502 INFLUENZA DNA AMP PROBE: CPT

## 2020-01-17 PROCEDURE — 87077 CULTURE AEROBIC IDENTIFY: CPT

## 2020-01-17 PROCEDURE — G0378 HOSPITAL OBSERVATION PER HR: HCPCS

## 2020-01-17 PROCEDURE — 96366 THER/PROPH/DIAG IV INF ADDON: CPT

## 2020-01-17 PROCEDURE — 83605 ASSAY OF LACTIC ACID: CPT

## 2020-01-17 PROCEDURE — 87088 URINE BACTERIA CULTURE: CPT

## 2020-01-17 PROCEDURE — 82962 GLUCOSE BLOOD TEST: CPT

## 2020-01-17 PROCEDURE — 94760 N-INVAS EAR/PLS OXIMETRY 1: CPT

## 2020-01-17 PROCEDURE — 63600175 PHARM REV CODE 636 W HCPCS: Performed by: INTERNAL MEDICINE

## 2020-01-17 PROCEDURE — 96375 TX/PRO/DX INJ NEW DRUG ADDON: CPT

## 2020-01-17 PROCEDURE — 96372 THER/PROPH/DIAG INJ SC/IM: CPT

## 2020-01-17 PROCEDURE — 96368 THER/DIAG CONCURRENT INF: CPT

## 2020-01-17 PROCEDURE — 87040 BLOOD CULTURE FOR BACTERIA: CPT | Mod: 59

## 2020-01-17 PROCEDURE — 82010 KETONE BODYS QUAN: CPT

## 2020-01-17 PROCEDURE — 80048 BASIC METABOLIC PNL TOTAL CA: CPT

## 2020-01-17 PROCEDURE — 63600175 PHARM REV CODE 636 W HCPCS: Performed by: PHYSICIAN ASSISTANT

## 2020-01-17 PROCEDURE — 25000003 PHARM REV CODE 250: Performed by: PHYSICIAN ASSISTANT

## 2020-01-17 PROCEDURE — 87186 SC STD MICRODIL/AGAR DIL: CPT

## 2020-01-17 PROCEDURE — 96367 TX/PROPH/DG ADDL SEQ IV INF: CPT

## 2020-01-17 PROCEDURE — 25500020 PHARM REV CODE 255

## 2020-01-17 PROCEDURE — 85007 BL SMEAR W/DIFF WBC COUNT: CPT

## 2020-01-17 PROCEDURE — 83690 ASSAY OF LIPASE: CPT

## 2020-01-17 PROCEDURE — S0028 INJECTION, FAMOTIDINE, 20 MG: HCPCS | Performed by: PHYSICIAN ASSISTANT

## 2020-01-17 PROCEDURE — 96361 HYDRATE IV INFUSION ADD-ON: CPT

## 2020-01-17 PROCEDURE — 20000000 HC ICU ROOM

## 2020-01-17 PROCEDURE — 80053 COMPREHEN METABOLIC PANEL: CPT

## 2020-01-17 PROCEDURE — 87086 URINE CULTURE/COLONY COUNT: CPT

## 2020-01-17 RX ORDER — SODIUM CHLORIDE 9 MG/ML
500 INJECTION, SOLUTION INTRAVENOUS
Status: COMPLETED | OUTPATIENT
Start: 2020-01-17 | End: 2020-01-17

## 2020-01-17 RX ORDER — PANTOPRAZOLE SODIUM 40 MG/10ML
40 INJECTION, POWDER, LYOPHILIZED, FOR SOLUTION INTRAVENOUS DAILY
Status: DISCONTINUED | OUTPATIENT
Start: 2020-01-18 | End: 2020-01-22 | Stop reason: HOSPADM

## 2020-01-17 RX ORDER — DEXTROSE MONOHYDRATE AND SODIUM CHLORIDE 5; .9 G/100ML; G/100ML
INJECTION, SOLUTION INTRAVENOUS CONTINUOUS PRN
Status: DISCONTINUED | OUTPATIENT
Start: 2020-01-17 | End: 2020-01-18

## 2020-01-17 RX ORDER — ONDANSETRON 2 MG/ML
4 INJECTION INTRAMUSCULAR; INTRAVENOUS EVERY 8 HOURS PRN
Status: DISCONTINUED | OUTPATIENT
Start: 2020-01-17 | End: 2020-01-18

## 2020-01-17 RX ORDER — FAMOTIDINE 10 MG/ML
20 INJECTION INTRAVENOUS
Status: COMPLETED | OUTPATIENT
Start: 2020-01-17 | End: 2020-01-17

## 2020-01-17 RX ORDER — POTASSIUM CHLORIDE 20 MEQ/15ML
40 SOLUTION ORAL
Status: DISCONTINUED | OUTPATIENT
Start: 2020-01-17 | End: 2020-01-18

## 2020-01-17 RX ORDER — LANOLIN ALCOHOL/MO/W.PET/CERES
800 CREAM (GRAM) TOPICAL
Status: DISCONTINUED | OUTPATIENT
Start: 2020-01-17 | End: 2020-01-18

## 2020-01-17 RX ORDER — SODIUM CHLORIDE 0.9 % (FLUSH) 0.9 %
10 SYRINGE (ML) INJECTION
Status: DISCONTINUED | OUTPATIENT
Start: 2020-01-17 | End: 2020-01-22 | Stop reason: HOSPADM

## 2020-01-17 RX ORDER — ONDANSETRON 2 MG/ML
8 INJECTION INTRAMUSCULAR; INTRAVENOUS
Status: COMPLETED | OUTPATIENT
Start: 2020-01-17 | End: 2020-01-17

## 2020-01-17 RX ORDER — HYDROCODONE BITARTRATE AND ACETAMINOPHEN 5; 325 MG/1; MG/1
1 TABLET ORAL EVERY 6 HOURS PRN
Status: DISCONTINUED | OUTPATIENT
Start: 2020-01-17 | End: 2020-01-22 | Stop reason: HOSPADM

## 2020-01-17 RX ORDER — GLUCAGON 1 MG
1 KIT INJECTION
Status: DISCONTINUED | OUTPATIENT
Start: 2020-01-17 | End: 2020-01-22 | Stop reason: HOSPADM

## 2020-01-17 RX ORDER — IBUPROFEN 200 MG
24 TABLET ORAL
Status: DISCONTINUED | OUTPATIENT
Start: 2020-01-17 | End: 2020-01-22 | Stop reason: HOSPADM

## 2020-01-17 RX ORDER — IBUPROFEN 200 MG
16 TABLET ORAL
Status: DISCONTINUED | OUTPATIENT
Start: 2020-01-17 | End: 2020-01-22 | Stop reason: HOSPADM

## 2020-01-17 RX ORDER — KETOROLAC TROMETHAMINE 30 MG/ML
15 INJECTION, SOLUTION INTRAMUSCULAR; INTRAVENOUS
Status: COMPLETED | OUTPATIENT
Start: 2020-01-17 | End: 2020-01-17

## 2020-01-17 RX ORDER — INSULIN ASPART 100 [IU]/ML
0-5 INJECTION, SOLUTION INTRAVENOUS; SUBCUTANEOUS
Status: DISCONTINUED | OUTPATIENT
Start: 2020-01-17 | End: 2020-01-17

## 2020-01-17 RX ORDER — INSULIN ASPART 100 [IU]/ML
1-10 INJECTION, SOLUTION INTRAVENOUS; SUBCUTANEOUS
Status: DISCONTINUED | OUTPATIENT
Start: 2020-01-17 | End: 2020-01-22 | Stop reason: HOSPADM

## 2020-01-17 RX ORDER — SODIUM CHLORIDE 9 MG/ML
INJECTION, SOLUTION INTRAVENOUS CONTINUOUS
Status: DISCONTINUED | OUTPATIENT
Start: 2020-01-17 | End: 2020-01-22

## 2020-01-17 RX ORDER — SODIUM,POTASSIUM PHOSPHATES 280-250MG
2 POWDER IN PACKET (EA) ORAL
Status: DISCONTINUED | OUTPATIENT
Start: 2020-01-17 | End: 2020-01-18

## 2020-01-17 RX ADMIN — SODIUM CHLORIDE 1000 ML: 0.9 INJECTION, SOLUTION INTRAVENOUS at 12:01

## 2020-01-17 RX ADMIN — ONDANSETRON 8 MG: 2 INJECTION INTRAMUSCULAR; INTRAVENOUS at 12:01

## 2020-01-17 RX ADMIN — SODIUM CHLORIDE 1000 ML: 0.9 INJECTION, SOLUTION INTRAVENOUS at 09:01

## 2020-01-17 RX ADMIN — CEFTRIAXONE 1 G: 1 INJECTION, SOLUTION INTRAVENOUS at 03:01

## 2020-01-17 RX ADMIN — LIDOCAINE HYDROCHLORIDE: 20 SOLUTION ORAL; TOPICAL at 12:01

## 2020-01-17 RX ADMIN — SODIUM CHLORIDE 8 UNITS/HR: 9 INJECTION, SOLUTION INTRAVENOUS at 09:01

## 2020-01-17 RX ADMIN — SODIUM CHLORIDE: 0.9 INJECTION, SOLUTION INTRAVENOUS at 08:01

## 2020-01-17 RX ADMIN — INSULIN ASPART 10 UNITS: 100 INJECTION, SOLUTION INTRAVENOUS; SUBCUTANEOUS at 08:01

## 2020-01-17 RX ADMIN — IOHEXOL 100 ML: 350 INJECTION, SOLUTION INTRAVENOUS at 02:01

## 2020-01-17 RX ADMIN — FAMOTIDINE 20 MG: 10 INJECTION INTRAVENOUS at 12:01

## 2020-01-17 RX ADMIN — KETOROLAC TROMETHAMINE 15 MG: 30 INJECTION, SOLUTION INTRAMUSCULAR at 03:01

## 2020-01-17 RX ADMIN — PROMETHAZINE HYDROCHLORIDE 12.5 MG: 25 INJECTION INTRAMUSCULAR; INTRAVENOUS at 03:01

## 2020-01-17 RX ADMIN — SODIUM CHLORIDE 500 ML: 0.9 INJECTION, SOLUTION INTRAVENOUS at 03:01

## 2020-01-17 RX ADMIN — INSULIN ASPART 8 UNITS: 100 INJECTION, SOLUTION INTRAVENOUS; SUBCUTANEOUS at 08:01

## 2020-01-17 NOTE — SUBJECTIVE & OBJECTIVE
"Past Medical History:   Diagnosis Date    Abnormal Pap smear of cervix     Diabetes in pregnancy 5/3/2019    Diabetes mellitus     diagnosed 6years ago.     Diabetes mellitus complicating pregnancy 5/26/2019    HPV in female     Pre-existing type 1 diabetes mellitus during pregnancy in third trimester 4/16/2019    Type 1 diabetes mellitus complicating pregnancy, antepartum 1/7/2019       Past Surgical History:   Procedure Laterality Date    CERVICAL BIOPSY  W/ LOOP ELECTRODE EXCISION         Review of patient's allergies indicates:   Allergen Reactions    Ciprofloxacin        No current facility-administered medications on file prior to encounter.      Current Outpatient Medications on File Prior to Encounter   Medication Sig    ibuprofen (ADVIL,MOTRIN) 600 MG tablet Take 1 tablet (600 mg total) by mouth every 6 (six) hours as needed for Pain.    insulin (BASAGLAR KWIKPEN U-100 INSULIN) glargine 100 units/mL (3mL) SubQ pen Inject 18 Units into the skin 2 (two) times daily.    insulin aspart U-100 (NOVOLOG U-100 INSULIN ASPART) 100 unit/mL injection 10 units prn elevated glucose  Maximun 40 units daily (Patient taking differently: Inject 10 Units into the skin 3 (three) times daily as needed for High Blood Sugar. 10 units prn elevated glucose  Maximun 40 units daily)    [DISCONTINUED] aspirin (ECOTRIN) 81 MG EC tablet Take 81 mg by mouth once daily.    [DISCONTINUED] BD ULTRA-FINE MINI PEN NEEDLE 31 gauge x 3/16" Ndle USE 4-6 TIMES A DAY    [DISCONTINUED] blood sugar diagnostic (BLOOD GLUCOSE TEST) Strp 1 strip by Misc.(Non-Drug; Combo Route) route 4 (four) times daily.    [DISCONTINUED] calcium carbonate (OS-RHONDA) 500 mg calcium (1,250 mg) chewable tablet Take 1 tablet by mouth once daily.    [DISCONTINUED] medroxyPROGESTERone (DEPO-PROVERA) 150 mg/mL Syrg Inject 1 mL (150 mg total) into the muscle every 3 (three) months.    [DISCONTINUED] prenatal vit/iron fum/folic ac (PRENATAL 1+1 ORAL) Take by " mouth.     Family History     Problem Relation (Age of Onset)    Congenital heart disease Paternal Aunt    Diabetes Father, Sister    Heart disease Mother    Hypertension Mother        Tobacco Use    Smoking status: Current Every Day Smoker     Packs/day: 0.50     Years: 10.00     Pack years: 5.00    Smokeless tobacco: Never Used   Substance and Sexual Activity    Alcohol use: Yes     Frequency: Never    Drug use: No    Sexual activity: Yes     Partners: Male     Birth control/protection: None     Review of Systems   Constitutional: Positive for appetite change and diaphoresis.   Gastrointestinal: Positive for abdominal pain, nausea and vomiting.   Genitourinary: Positive for flank pain.   All other systems reviewed and are negative.    Objective:     Vital Signs (Most Recent):  Temp: 97.8 °F (36.6 °C) (01/17/20 1041)  Pulse: (!) 117 (01/17/20 1624)  Resp: 16 (01/17/20 1300)  BP: 114/65 (01/17/20 1546)  SpO2: 98 % (01/17/20 1624) Vital Signs (24h Range):  Temp:  [97.8 °F (36.6 °C)] 97.8 °F (36.6 °C)  Pulse:  [] 117  Resp:  [16-18] 16  SpO2:  [98 %-100 %] 98 %  BP: (101-125)/(50-86) 114/65     Weight: 56.7 kg (125 lb)  Body mass index is 22.14 kg/m².    Physical Exam   Constitutional: She is oriented to person, place, and time. She appears well-developed.   HENT:   Head: Normocephalic and atraumatic.   Dry mucous membranes   Eyes: Pupils are equal, round, and reactive to light. Conjunctivae are normal.   Neck: Neck supple. No JVD present. No thyromegaly present.   Cardiovascular: Normal rate and regular rhythm. Exam reveals no gallop and no friction rub.   No murmur heard.  Pulmonary/Chest: Effort normal and breath sounds normal.   Abdominal: Soft. Bowel sounds are normal. She exhibits no distension and no mass. There is no tenderness.   Musculoskeletal: Normal range of motion. She exhibits no edema.   Neurological: She is oriented to person, place, and time. No cranial nerve deficit.   Skin: Skin is warm  and dry.   Psychiatric: Her behavior is normal.         CRANIAL NERVES     CN III, IV, VI   Pupils are equal, round, and reactive to light.       Significant Labs:   CBC:   Recent Labs   Lab 01/17/20  1229   WBC 17.01*   HGB 13.3   HCT 39.8        CMP:   Recent Labs   Lab 01/17/20  1229   *   K 4.9   CL 95   CO2 23   *   BUN 11   CREATININE 1.2   CALCIUM 9.7   PROT 7.2   ALBUMIN 3.5   BILITOT 0.8   ALKPHOS 89   AST 13   ALT 18   ANIONGAP 17*   EGFRNONAA 59*     Lactic Acid:   Recent Labs   Lab 01/17/20  1604   LACTATE 2.5*     Lipase:   Recent Labs   Lab 01/17/20  1229   LIPASE <3*     Urine Studies:   Recent Labs   Lab 01/17/20  1232   COLORU Yellow   APPEARANCEUA Clear   PHUR 6.0   SPECGRAV 1.020   PROTEINUA Negative   GLUCUA 4+*   KETONESU 3+*   BILIRUBINUA 1+*   OCCULTUA Trace*   NITRITE Negative   UROBILINOGEN Negative   LEUKOCYTESUR Negative   RBCUA 4   WBCUA >100*   BACTERIA Few*   SQUAMEPITHEL 14     Lactate: 2.5    Influenza rapid screen: Negative  Urine pregnancy test:  Negative    Significant Imaging:   CT abdomen and pelvis with contrast:  Abnormal appearance of the left kidney compatible with pyelonephritis.  No hydronephrosis.

## 2020-01-17 NOTE — ED PROVIDER NOTES
Encounter Date: 2020    SCRIBE #1 NOTE: Jade CANTRELL am scribing for, and in the presence of, Katlyn Gtz PA-C.       History     Chief Complaint   Patient presents with    Nausea    Emesis    Abdominal Pain       Time seen by provider: 12:01 PM on 2020    Tish Landeros is a 34 y.o. female who presents the ED with complaints of epigastric abdominal pain, vomiting, and nausea since x1 day ago. The patient also complains of associated left flank pain, chills, diaphoresis, fever, headache, and dizziness. She has not taken any medication for nausea or pain. The patient does not take any DM medications. She reports a high blood sugar and has concerns for DKA. The patient has been hospitalized for DKA years ago. She denies any history of abdominal surgeries. The patient last meal and bowel movement was x1 day ago. She denies cough, congestion, dysuria, frequency, hematuria, or blood in stool. The patient denies history of gastroparesis. PMHx of HPV and DM type I. Ciprofloxacin allergy noted.    The history is provided by the patient.     Review of patient's allergies indicates:   Allergen Reactions    Ciprofloxacin      Past Medical History:   Diagnosis Date    Abnormal Pap smear of cervix     Diabetes in pregnancy 5/3/2019    Diabetes mellitus     diagnosed 6years ago.     Diabetes mellitus complicating pregnancy 2019    HPV in female     Pre-existing type 1 diabetes mellitus during pregnancy in third trimester 2019    Type 1 diabetes mellitus complicating pregnancy, antepartum 2019     Past Surgical History:   Procedure Laterality Date    CERVICAL BIOPSY  W/ LOOP ELECTRODE EXCISION       Family History   Problem Relation Age of Onset    Diabetes Father         type 1    Hypertension Mother     Heart disease Mother         stent    Congenital heart disease Paternal Aunt          of hole in heart    Diabetes Sister         Juvenile DM    Arrhythmia Neg Hx      Cardiomyopathy Neg Hx     Pacemaker/defibrilator Neg Hx     Heart attacks under age 50 Neg Hx     Breast cancer Neg Hx     Ovarian cancer Neg Hx      Social History     Tobacco Use    Smoking status: Current Every Day Smoker     Packs/day: 0.50     Years: 10.00     Pack years: 5.00    Smokeless tobacco: Never Used   Substance Use Topics    Alcohol use: Yes     Frequency: Never    Drug use: No     Review of Systems   Constitutional: Positive for chills, diaphoresis and fever.   HENT: Negative for congestion, facial swelling and trouble swallowing.    Eyes: Negative for discharge.   Respiratory: Negative for cough, chest tightness, shortness of breath and wheezing.    Cardiovascular: Negative for chest pain and palpitations.   Gastrointestinal: Positive for abdominal pain, nausea and vomiting. Negative for diarrhea.   Genitourinary: Positive for flank pain. Negative for dysuria, frequency and hematuria.   Musculoskeletal: Negative for arthralgias, back pain, joint swelling, myalgias, neck pain and neck stiffness.   Skin: Negative for color change, pallor, rash and wound.   Neurological: Positive for dizziness and headaches. Negative for syncope, weakness, light-headedness and numbness.   Hematological: Does not bruise/bleed easily.   Psychiatric/Behavioral: The patient is not nervous/anxious.    All other systems reviewed and are negative.      Physical Exam     Initial Vitals [01/17/20 1041]   BP Pulse Resp Temp SpO2   125/86 105 18 97.8 °F (36.6 °C) 99 %      MAP       --         Physical Exam    Nursing note and vitals reviewed.  Constitutional: She appears well-developed and well-nourished. She is not diaphoretic. No distress.   HENT:   Head: Normocephalic and atraumatic.   Mouth/Throat: Oropharynx is clear and moist.   Eyes: Conjunctivae are normal.   Neck: Normal range of motion. Neck supple.   Cardiovascular: Normal rate, regular rhythm, normal heart sounds and intact distal pulses. Exam reveals no  gallop and no friction rub.    No murmur heard.  Pulmonary/Chest: Breath sounds normal. No respiratory distress. She has no wheezes. She has no rhonchi. She has no rales.   Equal, bilateral breath sounds noted without wheezing.      Abdominal: Soft. She exhibits no distension and no mass. There is tenderness.   Epigastric TTP noted on exam.  No distinct CVA tenderness.     Musculoskeletal: Normal range of motion. She exhibits no edema or tenderness.   Neurological: She is alert and oriented to person, place, and time. She has normal strength. No sensory deficit.   Skin: Skin is warm and dry. No rash and no abscess noted. No erythema.   Psychiatric: She has a normal mood and affect.         ED Course   Procedures  Labs Reviewed   CBC W/ AUTO DIFFERENTIAL - Abnormal; Notable for the following components:       Result Value    WBC 17.01 (*)     Mean Corpuscular Hemoglobin 31.6 (*)     Gran% 87.0 (*)     Lymph% 2.0 (*)     All other components within normal limits   COMPREHENSIVE METABOLIC PANEL - Abnormal; Notable for the following components:    Sodium 135 (*)     Glucose 418 (*)     Anion Gap 17 (*)     eGFR if non  59 (*)     All other components within normal limits   LIPASE - Abnormal; Notable for the following components:    Lipase <3 (*)     All other components within normal limits   URINALYSIS, REFLEX TO URINE CULTURE - Abnormal; Notable for the following components:    Glucose, UA 4+ (*)     Ketones, UA 3+ (*)     Bilirubin (UA) 1+ (*)     Occult Blood UA Trace (*)     All other components within normal limits    Narrative:     Preferred Collection Type->Urine, Clean Catch   URINALYSIS MICROSCOPIC - Abnormal; Notable for the following components:    WBC, UA >100 (*)     Bacteria Few (*)     All other components within normal limits    Narrative:     Preferred Collection Type->Urine, Clean Catch   POCT GLUCOSE - Abnormal; Notable for the following components:    POCT Glucose 366 (*)     All  other components within normal limits   INFLUENZA A & B BY MOLECULAR   CULTURE, URINE   CULTURE, BLOOD   CULTURE, BLOOD   LACTIC ACID, PLASMA   POCT URINE PREGNANCY   POCT GLUCOSE MONITORING CONTINUOUS          Imaging Results          CT Abdomen Pelvis With Contrast (Final result)  Result time 01/17/20 15:00:38    Final result by Kobe Lyman MD (01/17/20 15:00:38)                 Impression:      Abnormal appearance of the left kidney compatible with pyelonephritis.  No hydronephrosis.      Electronically signed by: Kobe Lyman MD  Date:    01/17/2020  Time:    15:00             Narrative:    EXAMINATION:  CT ABDOMEN PELVIS WITH CONTRAST    CLINICAL HISTORY:  Infection, abdomen-pelvis;    TECHNIQUE:  Low dose axial images, sagittal and coronal reformations were obtained from the lung bases to the pubic symphysis following the IV administration of 75 mL of Omnipaque 350 .  Oral contrast was not given.    COMPARISON:  None.    FINDINGS:  The left kidney demonstrates heterogeneous parenchymal enhancement of its upper lower poles, compatible with pyelonephritis.  The right kidney is unremarkable.  There is no hydronephrosis.    The liver, spleen, pancreas, and adrenal glands are unremarkable.  The appendix is not seen.  There are no right lower quadrant findings of appendicitis.  The small bowel is normal in caliber and appearance.  The colon is unremarkable.  Mild pelvic free fluid is present.  There is no free air.  The bones are unremarkable.                                 Medical Decision Making:   History:   Old Medical Records: I decided to obtain old medical records.  Old Records Summarized: records from clinic visits and records from previous admission(s).  Differential Diagnosis:   DKA  Viral Syndrome  UTI  Pyelonephritis  Gastritis   Clinical Tests:   Lab Tests: Reviewed and Ordered  Radiological Study: Ordered and Reviewed       APC / Resident Notes:   3:27 PM  Now considering possible  sepsis, given elevated WBCs, persistent tachycardia and confirmation of pyelonephritis on CT scan.  Will obtain blood cultures and lactic acid.     3:40 PM  WBC elevated. CT evidence for left pyelonephritis.  Urine culture pending.  Initially, her vomiting improved with Zofran, but has now reoccurred.  Pt is afebrile, but has been persistently tachycardic.  Will give a dose of IV Rocephin here in the ED.  She will benefit from admission for IV antibiotics and IV fluids.      4:26 PM  Dr. Lynn agrees to admission.  Pt voices understanding and is agreeable to the plan for admission.         Scribe Attestation:   Scribe #1: I performed the above scribed service and the documentation accurately describes the services I performed. I attest to the accuracy of the note.    Attending Attestation:     Physician Attestation Statement for NP/PA:   I discussed this assessment and plan of this patient with the NP/PA, but I did not personally examine the patient. The face to face encounter was performed by the NP/PA.    Other NP/PA Attestation Additions:    History of Present Illness: 34-year-old female presented with vomiting and upper abdominal pain.    Medical Decision Making: Initial differential diagnosis included but not limited to DKA, pancreatitis, and peptic ulcer disease.  Patient's labs were concerning for an elevated white blood cell count and infected urine.  The patient's CT scan was significant for pyelonephritis.  The patient was aggressively treated with IV fluids as well as IV antibiotics.  She will be admitted to the hospitalist service for further care.  I am in agreement with the physician assistant's  assessment, treatment, and plan of care.         I, Katlyn Gtz PA-C, personally performed the services described in this documentation. All medical record entries made by the scribe were at my direction and in my presence.  I have reviewed the chart and agree that the record reflects my personal  performance and is accurate and complete. Katlyn Gtz PA-C.  3:43 PM 01/17/2020                        Clinical Impression:       ICD-10-CM ICD-9-CM   1. Pyelonephritis N12 590.80   2. Nausea and vomiting, intractability of vomiting not specified, unspecified vomiting type R11.2 787.01         Disposition:   Disposition: Admitted  Condition: Stable                     Katlyn Gtz PA-C  01/17/20 1627       Katlyn Gtz PA-C  01/17/20 1628       Jn Feliciano MD  01/17/20 1632

## 2020-01-17 NOTE — ASSESSMENT & PLAN NOTE
Continue aggressive.  IV fluid hydration  Repeat BMP in the evening.  Check serum ketones  Check blood glucose level q AC/HS.  Use Novolog Insulin Sliding Scale as needed.   Continue American Diabetic Association 1800 Kcal diet.

## 2020-01-17 NOTE — H&P
[facility] Hospital Medicine  History & Physical    Patient Name: Tish Landeros  MRN: [id number]  Admission Date: 1/17/2020  Attending Physician: Christa Lynn MD   Primary Care Provider: Primary Doctor No         Patient information was obtained from patient and ER records.     Subjective:     Principal Problem:Sepsis    Chief Complaint:   Chief Complaint   Patient presents with    Nausea    Emesis    Abdominal Pain        HPI: Patient is a 34-year-old type 1 diabetic who is being admitted to Hospital Medicine from [facility] Emergency Room under observation status with complaint of epigastric pain associated with nausea and vomiting for 1 day.  Patient reports associated fever with diaphoresis, dizziness, headache and left flank discomfort.  Patient denies any hematuria or pyuria.  Patient denies any history of nephrolithiasis.  Patient reports higher than usual blood sugars lately.  Patient is prior history of diabetic ketoacidosis.  No urinary complaints reported.  Patient denies any hematemesis or melena.  No sick contact or recent travel history reported.        Past Medical History:   Diagnosis Date    Abnormal Pap smear of cervix     Diabetes in pregnancy 5/3/2019    Diabetes mellitus     diagnosed 6years ago.     Diabetes mellitus complicating pregnancy 5/26/2019    HPV in female     Pre-existing type 1 diabetes mellitus during pregnancy in third trimester 4/16/2019    Type 1 diabetes mellitus complicating pregnancy, antepartum 1/7/2019       Past Surgical History:   Procedure Laterality Date    CERVICAL BIOPSY  W/ LOOP ELECTRODE EXCISION         Review of patient's allergies indicates:   Allergen Reactions    Ciprofloxacin        No current facility-administered medications on file prior to encounter.      Current Outpatient Medications on File Prior to Encounter   Medication Sig    ibuprofen (ADVIL,MOTRIN) 600 MG tablet Take 1 tablet (600 mg  "total) by mouth every 6 (six) hours as needed for Pain.    insulin (BASAGLAR KWIKPEN U-100 INSULIN) glargine 100 units/mL (3mL) SubQ pen Inject 18 Units into the skin 2 (two) times daily.    insulin aspart U-100 (NOVOLOG U-100 INSULIN ASPART) 100 unit/mL injection 10 units prn elevated glucose  Maximun 40 units daily (Patient taking differently: Inject 10 Units into the skin 3 (three) times daily as needed for High Blood Sugar. 10 units prn elevated glucose  Maximun 40 units daily)    [DISCONTINUED] aspirin (ECOTRIN) 81 MG EC tablet Take 81 mg by mouth once daily.    [DISCONTINUED] BD ULTRA-FINE MINI PEN NEEDLE 31 gauge x 3/16" Ndle USE 4-6 TIMES A DAY    [DISCONTINUED] blood sugar diagnostic (BLOOD GLUCOSE TEST) Strp 1 strip by Misc.(Non-Drug; Combo Route) route 4 (four) times daily.    [DISCONTINUED] calcium carbonate (OS-RHONDA) 500 mg calcium (1,250 mg) chewable tablet Take 1 tablet by mouth once daily.    [DISCONTINUED] medroxyPROGESTERone (DEPO-PROVERA) 150 mg/mL Syrg Inject 1 mL (150 mg total) into the muscle every 3 (three) months.    [DISCONTINUED] prenatal vit/iron fum/folic ac (PRENATAL 1+1 ORAL) Take by mouth.     Family History     Problem Relation (Age of Onset)    Congenital heart disease Paternal Aunt    Diabetes Father, Sister    Heart disease Mother    Hypertension Mother        Tobacco Use    Smoking status: Current Every Day Smoker     Packs/day: 0.50     Years: 10.00     Pack years: 5.00    Smokeless tobacco: Never Used   Substance and Sexual Activity    Alcohol use: Yes     Frequency: Never    Drug use: No    Sexual activity: Yes     Partners: Male     Birth control/protection: None     Review of Systems   Constitutional: Positive for appetite change and diaphoresis.   Gastrointestinal: Positive for abdominal pain, nausea and vomiting.   Genitourinary: Positive for flank pain.   All other systems reviewed and are negative.    Objective:     Vital Signs (Most Recent):  Temp: 97.8 °F " (36.6 °C) (01/17/20 1041)  Pulse: (!) 117 (01/17/20 1624)  Resp: 16 (01/17/20 1300)  BP: 114/65 (01/17/20 1546)  SpO2: 98 % (01/17/20 1624) Vital Signs (24h Range):  Temp:  [97.8 °F (36.6 °C)] 97.8 °F (36.6 °C)  Pulse:  [] 117  Resp:  [16-18] 16  SpO2:  [98 %-100 %] 98 %  BP: (101-125)/(50-86) 114/65     Weight: 56.7 kg (125 lb)  Body mass index is 22.14 kg/m².    Physical Exam   Constitutional: She is oriented to person, place, and time. She appears well-developed.   HENT:   Head: Normocephalic and atraumatic.   Dry mucous membranes   Eyes: Pupils are equal, round, and reactive to light. Conjunctivae are normal.   Neck: Neck supple. No JVD present. No thyromegaly present.   Cardiovascular: Normal rate and regular rhythm. Exam reveals no gallop and no friction rub.   No murmur heard.  Pulmonary/Chest: Effort normal and breath sounds normal.   Abdominal: Soft. Bowel sounds are normal. She exhibits no distension and no mass. There is no tenderness.   Musculoskeletal: Normal range of motion. She exhibits no edema.   Neurological: She is oriented to person, place, and time. No cranial nerve deficit.   Skin: Skin is warm and dry.   Psychiatric: Her behavior is normal.         CRANIAL NERVES     CN III, IV, VI   Pupils are equal, round, and reactive to light.       Significant Labs:   CBC:   Recent Labs   Lab 01/17/20  1229   WBC 17.01*   HGB 13.3   HCT 39.8        CMP:   Recent Labs   Lab 01/17/20  1229   *   K 4.9   CL 95   CO2 23   *   BUN 11   CREATININE 1.2   CALCIUM 9.7   PROT 7.2   ALBUMIN 3.5   BILITOT 0.8   ALKPHOS 89   AST 13   ALT 18   ANIONGAP 17*   EGFRNONAA 59*     Lactic Acid:   Recent Labs   Lab 01/17/20  1604   LACTATE 2.5*     Lipase:   Recent Labs   Lab 01/17/20  1229   LIPASE <3*     Urine Studies:   Recent Labs   Lab 01/17/20  1232   COLORU Yellow   APPEARANCEUA Clear   PHUR 6.0   SPECGRAV 1.020   PROTEINUA Negative   GLUCUA 4+*   KETONESU 3+*   BILIRUBINUA 1+*   OCCULTUA  Trace*   NITRITE Negative   UROBILINOGEN Negative   LEUKOCYTESUR Negative   RBCUA 4   WBCUA >100*   BACTERIA Few*   SQUAMEPITHEL 14     Lactate: 2.5    Influenza rapid screen: Negative  Urine pregnancy test:  Negative    Significant Imaging:   CT abdomen and pelvis with contrast:  Abnormal appearance of the left kidney compatible with pyelonephritis.  No hydronephrosis.    Assessment/Plan:     * Sepsis  Tish Landeros is a 34 y.o. female who presents to the Emergency Department with sepsis and acute left pyelonephritis  This patient does have evidence of infective focus  My overall impression is sepsis with left acute pyelonephritis  Lab Results   Component Value Date    WBC 17.01 (H) 01/17/2020    HGB 13.3 01/17/2020    HCT 39.8 01/17/2020    MCV 95 01/17/2020     01/17/2020    Trend lactate level which is 2.5 now.  Aggressive IV fluid hydration initiated in the ER.  Continue IV Rocephin 1 g daily.  Follow microbiology results.  Vital signs post fluid administrations were-    Temp Readings from Last 1 Encounters:   01/17/20 97.8 °F (36.6 °C) (Oral)     BP Readings from Last 1 Encounters:   01/17/20 114/65     Pulse Readings from Last 1 Encounters:   01/17/20 (!) 117     Pyelonephritis  Follow microbiology results.  Continue IV Rocephin 1 g daily.    Diabetes type 1, controlled  Continue aggressive.  IV fluid hydration  Repeat BMP in the evening.  Check serum ketones  Check blood glucose level q AC/HS.  Use Novolog Insulin Sliding Scale as needed.   Continue American Diabetic Association 1800 Kcal diet.  Check hemoglobin A1c.    Nausea and vomiting  Dehydration  Continue IVF hydration and use anti-emetics as needed.     Discussed with significant other.     DVT prophylaxis:  Use sequential compression stockings and Kwaku hose.  Patient is ambulatory.    Christa Lynn MD  Department of Hospital Medicine   [facility]

## 2020-01-17 NOTE — HPI
Patient is a 34-year-old type 1 diabetic who is being admitted to Hospital Medicine from [facility] Emergency Room under observation status with complaint of epigastric pain associated with nausea and vomiting for 1 day.  Patient reports associated fever with diaphoresis, dizziness, headache and left flank discomfort.  Patient denies any hematuria or pyuria.  Patient denies any history of nephrolithiasis.  Patient reports higher than usual blood sugars lately.  Patient is prior history of diabetic ketoacidosis.  No urinary complaints reported.  Patient denies any hematemesis or melena.  No sick contact or recent travel history reported.

## 2020-01-17 NOTE — ASSESSMENT & PLAN NOTE
Tish Landeros is a 34 y.o. female who presents to the Emergency Department with sepsis and acute left pyelonephritis  This patient does have evidence of infective focus  My overall impression is sepsis with left acute pyelonephritis  Lab Results   Component Value Date    WBC 17.01 (H) 01/17/2020    HGB 13.3 01/17/2020    HCT 39.8 01/17/2020    MCV 95 01/17/2020     01/17/2020    Trend lactate level which is 2.5 now.  Aggressive IV fluid hydration initiated in the ER.  Continue IV Rocephin 1 g daily.  Follow microbiology results.  Vital signs post fluid administrations were-    Temp Readings from Last 1 Encounters:   01/17/20 97.8 °F (36.6 °C) (Oral)     BP Readings from Last 1 Encounters:   01/17/20 114/65     Pulse Readings from Last 1 Encounters:   01/17/20 (!) 117

## 2020-01-18 LAB
ALBUMIN SERPL BCP-MCNC: 2.9 G/DL (ref 3.5–5.2)
ALP SERPL-CCNC: 73 U/L (ref 55–135)
ALT SERPL W/O P-5'-P-CCNC: 14 U/L (ref 10–44)
ANION GAP SERPL CALC-SCNC: 11 MMOL/L (ref 8–16)
ANION GAP SERPL CALC-SCNC: 18 MMOL/L (ref 8–16)
ANION GAP SERPL CALC-SCNC: 7 MMOL/L (ref 8–16)
ANION GAP SERPL CALC-SCNC: 8 MMOL/L (ref 8–16)
AST SERPL-CCNC: 11 U/L (ref 10–40)
BASOPHILS NFR BLD: 0 % (ref 0–1.9)
BILIRUB SERPL-MCNC: 0.3 MG/DL (ref 0.1–1)
BUN SERPL-MCNC: 10 MG/DL (ref 6–20)
BUN SERPL-MCNC: 9 MG/DL (ref 6–20)
CALCIUM SERPL-MCNC: 8.4 MG/DL (ref 8.7–10.5)
CALCIUM SERPL-MCNC: 8.6 MG/DL (ref 8.7–10.5)
CALCIUM SERPL-MCNC: 8.7 MG/DL (ref 8.7–10.5)
CALCIUM SERPL-MCNC: 9.1 MG/DL (ref 8.7–10.5)
CHLORIDE SERPL-SCNC: 107 MMOL/L (ref 95–110)
CHLORIDE SERPL-SCNC: 109 MMOL/L (ref 95–110)
CHLORIDE SERPL-SCNC: 111 MMOL/L (ref 95–110)
CHLORIDE SERPL-SCNC: 112 MMOL/L (ref 95–110)
CO2 SERPL-SCNC: 15 MMOL/L (ref 23–29)
CO2 SERPL-SCNC: 22 MMOL/L (ref 23–29)
CO2 SERPL-SCNC: 23 MMOL/L (ref 23–29)
CO2 SERPL-SCNC: 23 MMOL/L (ref 23–29)
CREAT SERPL-MCNC: 0.9 MG/DL (ref 0.5–1.4)
CREAT SERPL-MCNC: 0.9 MG/DL (ref 0.5–1.4)
CREAT SERPL-MCNC: 1 MG/DL (ref 0.5–1.4)
CREAT SERPL-MCNC: 1.4 MG/DL (ref 0.5–1.4)
DIFFERENTIAL METHOD: ABNORMAL
EOSINOPHIL NFR BLD: 0 % (ref 0–8)
ERYTHROCYTE [DISTWIDTH] IN BLOOD BY AUTOMATED COUNT: 12.3 % (ref 11.5–14.5)
EST. GFR  (AFRICAN AMERICAN): 56 ML/MIN/1.73 M^2
EST. GFR  (AFRICAN AMERICAN): >60 ML/MIN/1.73 M^2
EST. GFR  (NON AFRICAN AMERICAN): 49 ML/MIN/1.73 M^2
EST. GFR  (NON AFRICAN AMERICAN): >60 ML/MIN/1.73 M^2
ESTIMATED AVG GLUCOSE: 246 MG/DL (ref 68–131)
GLUCOSE SERPL-MCNC: 215 MG/DL (ref 70–110)
GLUCOSE SERPL-MCNC: 215 MG/DL (ref 70–110)
GLUCOSE SERPL-MCNC: 248 MG/DL (ref 70–110)
GLUCOSE SERPL-MCNC: 364 MG/DL (ref 70–110)
HBA1C MFR BLD HPLC: 10.2 % (ref 4–5.6)
HCT VFR BLD AUTO: 35.4 % (ref 37–48.5)
HGB BLD-MCNC: 11.9 G/DL (ref 12–16)
IMM GRANULOCYTES # BLD AUTO: ABNORMAL K/UL (ref 0–0.04)
LACTATE SERPL-SCNC: 1.2 MMOL/L (ref 0.5–2.2)
LYMPHOCYTES NFR BLD: 6 % (ref 18–48)
MCH RBC QN AUTO: 32 PG (ref 27–31)
MCHC RBC AUTO-ENTMCNC: 33.6 G/DL (ref 32–36)
MCV RBC AUTO: 95 FL (ref 82–98)
MONOCYTES NFR BLD: 4 % (ref 4–15)
NEUTROPHILS NFR BLD: 83 % (ref 38–73)
NEUTS BAND NFR BLD MANUAL: 7 %
NRBC BLD-RTO: 0 /100 WBC
PLATELET # BLD AUTO: 187 K/UL (ref 150–350)
PMV BLD AUTO: 10.7 FL (ref 9.2–12.9)
POCT GLUCOSE: 144 MG/DL (ref 70–110)
POCT GLUCOSE: 154 MG/DL (ref 70–110)
POCT GLUCOSE: 198 MG/DL (ref 70–110)
POCT GLUCOSE: 200 MG/DL (ref 70–110)
POCT GLUCOSE: 202 MG/DL (ref 70–110)
POCT GLUCOSE: 210 MG/DL (ref 70–110)
POCT GLUCOSE: 220 MG/DL (ref 70–110)
POCT GLUCOSE: 232 MG/DL (ref 70–110)
POCT GLUCOSE: 239 MG/DL (ref 70–110)
POCT GLUCOSE: 253 MG/DL (ref 70–110)
POCT GLUCOSE: 317 MG/DL (ref 70–110)
POCT GLUCOSE: 319 MG/DL (ref 70–110)
POCT GLUCOSE: 379 MG/DL (ref 70–110)
POTASSIUM SERPL-SCNC: 3.7 MMOL/L (ref 3.5–5.1)
POTASSIUM SERPL-SCNC: 3.8 MMOL/L (ref 3.5–5.1)
POTASSIUM SERPL-SCNC: 3.9 MMOL/L (ref 3.5–5.1)
POTASSIUM SERPL-SCNC: 4.1 MMOL/L (ref 3.5–5.1)
PROT SERPL-MCNC: 6.2 G/DL (ref 6–8.4)
RBC # BLD AUTO: 3.72 M/UL (ref 4–5.4)
SODIUM SERPL-SCNC: 140 MMOL/L (ref 136–145)
SODIUM SERPL-SCNC: 141 MMOL/L (ref 136–145)
SODIUM SERPL-SCNC: 142 MMOL/L (ref 136–145)
SODIUM SERPL-SCNC: 143 MMOL/L (ref 136–145)
WBC # BLD AUTO: 15.79 K/UL (ref 3.9–12.7)

## 2020-01-18 PROCEDURE — 80048 BASIC METABOLIC PNL TOTAL CA: CPT | Mod: 91

## 2020-01-18 PROCEDURE — 63600175 PHARM REV CODE 636 W HCPCS: Performed by: NURSE PRACTITIONER

## 2020-01-18 PROCEDURE — 94761 N-INVAS EAR/PLS OXIMETRY MLT: CPT

## 2020-01-18 PROCEDURE — 36415 COLL VENOUS BLD VENIPUNCTURE: CPT

## 2020-01-18 PROCEDURE — 96366 THER/PROPH/DIAG IV INF ADDON: CPT

## 2020-01-18 PROCEDURE — 63600175 PHARM REV CODE 636 W HCPCS: Performed by: INTERNAL MEDICINE

## 2020-01-18 PROCEDURE — 80053 COMPREHEN METABOLIC PANEL: CPT

## 2020-01-18 PROCEDURE — 25000003 PHARM REV CODE 250: Performed by: INTERNAL MEDICINE

## 2020-01-18 PROCEDURE — 85027 COMPLETE CBC AUTOMATED: CPT

## 2020-01-18 PROCEDURE — 85007 BL SMEAR W/DIFF WBC COUNT: CPT

## 2020-01-18 PROCEDURE — 12000002 HC ACUTE/MED SURGE SEMI-PRIVATE ROOM

## 2020-01-18 PROCEDURE — 96376 TX/PRO/DX INJ SAME DRUG ADON: CPT

## 2020-01-18 PROCEDURE — 83605 ASSAY OF LACTIC ACID: CPT

## 2020-01-18 PROCEDURE — C9399 UNCLASSIFIED DRUGS OR BIOLOG: HCPCS | Performed by: INTERNAL MEDICINE

## 2020-01-18 PROCEDURE — C9113 INJ PANTOPRAZOLE SODIUM, VIA: HCPCS | Performed by: INTERNAL MEDICINE

## 2020-01-18 PROCEDURE — 80048 BASIC METABOLIC PNL TOTAL CA: CPT

## 2020-01-18 RX ORDER — METOCLOPRAMIDE HYDROCHLORIDE 5 MG/ML
INJECTION INTRAMUSCULAR; INTRAVENOUS
Status: DISPENSED
Start: 2020-01-18 | End: 2020-01-18

## 2020-01-18 RX ORDER — PROMETHAZINE HYDROCHLORIDE 25 MG/ML
INJECTION, SOLUTION INTRAMUSCULAR; INTRAVENOUS
Status: DISPENSED
Start: 2020-01-18 | End: 2020-01-18

## 2020-01-18 RX ORDER — METOCLOPRAMIDE HYDROCHLORIDE 5 MG/ML
10 INJECTION INTRAMUSCULAR; INTRAVENOUS ONCE
Status: COMPLETED | OUTPATIENT
Start: 2020-01-18 | End: 2020-01-18

## 2020-01-18 RX ORDER — METOCLOPRAMIDE HYDROCHLORIDE 5 MG/ML
10 INJECTION INTRAMUSCULAR; INTRAVENOUS EVERY 8 HOURS PRN
Status: DISCONTINUED | OUTPATIENT
Start: 2020-01-18 | End: 2020-01-19

## 2020-01-18 RX ORDER — IBUPROFEN 600 MG/1
600 TABLET ORAL ONCE
Status: COMPLETED | OUTPATIENT
Start: 2020-01-18 | End: 2020-01-18

## 2020-01-18 RX ORDER — ACETAMINOPHEN 325 MG/1
650 TABLET ORAL EVERY 6 HOURS PRN
Status: DISCONTINUED | OUTPATIENT
Start: 2020-01-18 | End: 2020-01-20

## 2020-01-18 RX ORDER — MORPHINE SULFATE 4 MG/ML
4 INJECTION, SOLUTION INTRAMUSCULAR; INTRAVENOUS ONCE
Status: COMPLETED | OUTPATIENT
Start: 2020-01-18 | End: 2020-01-18

## 2020-01-18 RX ORDER — ONDANSETRON 2 MG/ML
4 INJECTION INTRAMUSCULAR; INTRAVENOUS EVERY 6 HOURS PRN
Status: DISCONTINUED | OUTPATIENT
Start: 2020-01-18 | End: 2020-01-19

## 2020-01-18 RX ADMIN — HYDROCODONE BITARTRATE AND ACETAMINOPHEN 1 TABLET: 5; 325 TABLET ORAL at 05:01

## 2020-01-18 RX ADMIN — SODIUM CHLORIDE: 0.9 INJECTION, SOLUTION INTRAVENOUS at 05:01

## 2020-01-18 RX ADMIN — CEFTRIAXONE 1 G: 1 INJECTION, SOLUTION INTRAVENOUS at 05:01

## 2020-01-18 RX ADMIN — DEXTROSE AND SODIUM CHLORIDE: 5; .9 INJECTION, SOLUTION INTRAVENOUS at 08:01

## 2020-01-18 RX ADMIN — ONDANSETRON 4 MG: 2 INJECTION INTRAMUSCULAR; INTRAVENOUS at 01:01

## 2020-01-18 RX ADMIN — INSULIN ASPART 4 UNITS: 100 INJECTION, SOLUTION INTRAVENOUS; SUBCUTANEOUS at 11:01

## 2020-01-18 RX ADMIN — PANTOPRAZOLE SODIUM 40 MG: 40 INJECTION, POWDER, LYOPHILIZED, FOR SOLUTION INTRAVENOUS at 08:01

## 2020-01-18 RX ADMIN — PROMETHAZINE HYDROCHLORIDE 12.5 MG: 25 INJECTION INTRAMUSCULAR; INTRAVENOUS at 04:01

## 2020-01-18 RX ADMIN — SODIUM CHLORIDE: 0.9 INJECTION, SOLUTION INTRAVENOUS at 11:01

## 2020-01-18 RX ADMIN — METOCLOPRAMIDE 10 MG: 5 INJECTION, SOLUTION INTRAMUSCULAR; INTRAVENOUS at 11:01

## 2020-01-18 RX ADMIN — DEXTROSE AND SODIUM CHLORIDE: 5; .9 INJECTION, SOLUTION INTRAVENOUS at 02:01

## 2020-01-18 RX ADMIN — HYDROCODONE BITARTRATE AND ACETAMINOPHEN 1 TABLET: 5; 325 TABLET ORAL at 01:01

## 2020-01-18 RX ADMIN — PROMETHAZINE HYDROCHLORIDE 12.5 MG: 25 INJECTION INTRAMUSCULAR; INTRAVENOUS at 08:01

## 2020-01-18 RX ADMIN — MORPHINE SULFATE 4 MG: 4 INJECTION, SOLUTION INTRAMUSCULAR; INTRAVENOUS at 09:01

## 2020-01-18 RX ADMIN — INSULIN DETEMIR 20 UNITS: 100 INJECTION, SOLUTION SUBCUTANEOUS at 09:01

## 2020-01-18 RX ADMIN — INSULIN DETEMIR 20 UNITS: 100 INJECTION, SOLUTION SUBCUTANEOUS at 08:01

## 2020-01-18 RX ADMIN — ONDANSETRON 4 MG: 2 INJECTION INTRAMUSCULAR; INTRAVENOUS at 07:01

## 2020-01-18 RX ADMIN — INSULIN ASPART 6 UNITS: 100 INJECTION, SOLUTION INTRAVENOUS; SUBCUTANEOUS at 05:01

## 2020-01-18 RX ADMIN — IBUPROFEN 600 MG: 600 TABLET ORAL at 05:01

## 2020-01-18 NOTE — PROGRESS NOTES
Patient transferred to room 313 via wheelchair. Report given to Chen. Mac Longoria notified of transfer.

## 2020-01-18 NOTE — PROGRESS NOTES
[facility] Hospital Medicine  Progress Note    Patient Name: Tish Landeros  MRN: [id number]  Patient Class: IP- Inpatient   Admission Date: 1/17/2020  Length of Stay: 0 days  Attending Physician: Christa Lynn MD  Primary Care Provider: Primary Doctor No        Subjective:     Principal Problem:Sepsis        HPI:  Patient is a 34-year-old type 1 diabetic who is being admitted to Hospital Medicine from [facility] Emergency Room under observation status with complaint of epigastric pain associated with nausea and vomiting for 1 day.  Patient reports associated fever with diaphoresis, dizziness, headache and left flank discomfort.  Patient denies any hematuria or pyuria.  Patient denies any history of nephrolithiasis.  Patient reports higher than usual blood sugars lately.  Patient is prior history of diabetic ketoacidosis.  No urinary complaints reported.  Patient denies any hematemesis or melena.  No sick contact or recent travel history reported.        Overview/Hospital Course:  No notes on file    Interval History: Patient seen and examined. No acute events overnight.  Complaining of nausea and abdominal pain this morning. Otherwise no other complaints.     Review of Systems   Constitutional: Negative for activity change, appetite change and fever.   HENT: Negative.    Eyes: Negative.    Respiratory: Negative for chest tightness, shortness of breath and wheezing.    Cardiovascular: Negative for chest pain, palpitations and leg swelling.   Gastrointestinal: Positive for abdominal pain, nausea and vomiting. Negative for abdominal distention, blood in stool and diarrhea.   Genitourinary: Negative for dysuria and hematuria.   Neurological: Negative for headaches.   Hematological: Negative for adenopathy.   Psychiatric/Behavioral: Negative for confusion.     Objective:     Vital Signs (Most Recent):  Temp: 99 °F (37.2 °C) (01/18/20 0730)  Pulse: 98 (01/18/20 0730)  Resp:  (!) 25 (01/18/20 0730)  BP: 118/78 (01/18/20 0730)  SpO2: 99 % (01/18/20 0730) Vital Signs (24h Range):  Temp:  [96.3 °F (35.7 °C)-99 °F (37.2 °C)] 99 °F (37.2 °C)  Pulse:  [] 98  Resp:  [16-33] 25  SpO2:  [95 %-100 %] 99 %  BP: ()/(50-86) 118/78     Weight: 87.9 kg (193 lb 12.5 oz)  Body mass index is 34.33 kg/m².    Intake/Output Summary (Last 24 hours) at 1/18/2020 0915  Last data filed at 1/18/2020 0149  Gross per 24 hour   Intake --   Output 650 ml   Net -650 ml      Physical Exam   Constitutional: She is oriented to person, place, and time. She appears well-developed and well-nourished. No distress.   HENT:   Head: Normocephalic and atraumatic.   Eyes: Pupils are equal, round, and reactive to light.   Neck: Neck supple. No thyromegaly present.   Cardiovascular: Normal rate and regular rhythm. Exam reveals no gallop and no friction rub.   No murmur heard.  Pulmonary/Chest: Effort normal and breath sounds normal. No respiratory distress. She has no wheezes.   Abdominal: Soft. Bowel sounds are normal. She exhibits no distension. There is no tenderness. There is no guarding.   Musculoskeletal: Normal range of motion. She exhibits no edema.   Neurological: She is alert and oriented to person, place, and time.   Skin: Skin is warm and dry. No erythema.   Psychiatric: She has a normal mood and affect.       Significant Labs:   CBC:   Recent Labs   Lab 01/17/20  1229 01/18/20  0435   WBC 17.01* 15.79*   HGB 13.3 11.9*   HCT 39.8 35.4*    187       Significant Imaging: I have reviewed all pertinent imaging results/findings within the past 24 hours.      Assessment/Plan:      * Sepsis  Tish Landeros is a 34 y.o. female who presents to the Emergency Department with sepsis and acute left pyelonephritis  This patient does have evidence of infective focus  My overall impression is sepsis with left acute pyelonephritis  Lab Results   Component Value Date    WBC 15.79 (H) 01/18/2020    HGB 11.9 (L)  01/18/2020    HCT 35.4 (L) 01/18/2020    MCV 95 01/18/2020     01/18/2020    Trend lactate level which is 2.5 now.  Aggressive IV fluid hydration initiated in the ER.  Continue IV Rocephin 1 g daily.  Follow microbiology results.  Vital signs post fluid administrations were-    Temp Readings from Last 1 Encounters:   01/18/20 99 °F (37.2 °C) (Oral)     BP Readings from Last 1 Encounters:   01/18/20 118/78     Pulse Readings from Last 1 Encounters:   01/18/20 98           Pyelonephritis  Follow microbiology results.  Continue IV Rocephin 1 g daily.      Diabetes type 1, controlled  - start basal insulin @20 BID  - Stop drip at noon  - Continue mealtime dosing  - Ok for diet        VTE Risk Mitigation (From admission, onward)         Ordered     IP VTE LOW RISK PATIENT  Once      01/17/20 1841     Place sequential compression device  Until discontinued      01/17/20 1841     Place CARRIE hose  Until discontinued      01/17/20 1841     Place sequential compression device  Until discontinued      01/17/20 1808                Critical care time spent on the evaluation and treatment of severe organ dysfunction, review of pertinent labs and imaging studies, discussions with consulting providers and discussions with patient/family: 25 minutes.      Logan Simons MD  Department of Hospital Medicine   [facility]

## 2020-01-18 NOTE — NURSING
Notified NP Elidia of 2 consecutive Blood glucose of greater than 500. Ordered blood serum glucose per protocol. NP said not to give sliding scale currently ordered and she would look over orders and adjust insulin levels accordingly. Awaiting further orders.

## 2020-01-18 NOTE — SUBJECTIVE & OBJECTIVE
Interval History: Patient seen and examined. No acute events overnight.  Complaining of nausea and abdominal pain this morning. Otherwise no other complaints.     Review of Systems   Constitutional: Negative for activity change, appetite change and fever.   HENT: Negative.    Eyes: Negative.    Respiratory: Negative for chest tightness, shortness of breath and wheezing.    Cardiovascular: Negative for chest pain, palpitations and leg swelling.   Gastrointestinal: Positive for abdominal pain, nausea and vomiting. Negative for abdominal distention, blood in stool and diarrhea.   Genitourinary: Negative for dysuria and hematuria.   Neurological: Negative for headaches.   Hematological: Negative for adenopathy.   Psychiatric/Behavioral: Negative for confusion.     Objective:     Vital Signs (Most Recent):  Temp: 99 °F (37.2 °C) (01/18/20 0730)  Pulse: 98 (01/18/20 0730)  Resp: (!) 25 (01/18/20 0730)  BP: 118/78 (01/18/20 0730)  SpO2: 99 % (01/18/20 0730) Vital Signs (24h Range):  Temp:  [96.3 °F (35.7 °C)-99 °F (37.2 °C)] 99 °F (37.2 °C)  Pulse:  [] 98  Resp:  [16-33] 25  SpO2:  [95 %-100 %] 99 %  BP: ()/(50-86) 118/78     Weight: 87.9 kg (193 lb 12.5 oz)  Body mass index is 34.33 kg/m².    Intake/Output Summary (Last 24 hours) at 1/18/2020 0915  Last data filed at 1/18/2020 0149  Gross per 24 hour   Intake --   Output 650 ml   Net -650 ml      Physical Exam   Constitutional: She is oriented to person, place, and time. She appears well-developed and well-nourished. No distress.   HENT:   Head: Normocephalic and atraumatic.   Eyes: Pupils are equal, round, and reactive to light.   Neck: Neck supple. No thyromegaly present.   Cardiovascular: Normal rate and regular rhythm. Exam reveals no gallop and no friction rub.   No murmur heard.  Pulmonary/Chest: Effort normal and breath sounds normal. No respiratory distress. She has no wheezes.   Abdominal: Soft. Bowel sounds are normal. She exhibits no distension.  There is no tenderness. There is no guarding.   Musculoskeletal: Normal range of motion. She exhibits no edema.   Neurological: She is alert and oriented to person, place, and time.   Skin: Skin is warm and dry. No erythema.   Psychiatric: She has a normal mood and affect.       Significant Labs:   CBC:   Recent Labs   Lab 01/17/20  1229 01/18/20  0435   WBC 17.01* 15.79*   HGB 13.3 11.9*   HCT 39.8 35.4*    187       Significant Imaging: I have reviewed all pertinent imaging results/findings within the past 24 hours.

## 2020-01-18 NOTE — PROGRESS NOTES
e-ICU admit note      Reason for ICU admission:     DKA, transfer from floor      HPI:    Originally admitted to floor for sepsis, pyelonephritis .  Had abd pain, N/V    Initial Glucose measurement was 366 at 12:31 pm. Initial AG was 17 at the same time and HCO3 was 23, urine ketones +3    At 7:40 pm glucose was 700, AG 25, HCO3 11      PHx:    DM  DKA      Home Meds:    Ibuprofen  Glargine 18 U bid  Aspart 10 U tid        Significant labs, images:    CT abd:    Abnormal appearance of the left kidney compatible with pyelonephritis.  No hydronephrosis        I viewed the pt via camera at 10:31pm:    98/55, 100 sinus, RR 23, sat     Awake lying comfortably in bed    NAD    Insulin ongoing at 8u/hr    NS at 200ml/hr           Assessment/Plan      Severe sepsis  Lactate 3.0, trend  IV fluids    DKA  Following DKA protocol for fluids, lytes and insulin    Pyelonephritis  Ceftriaxone  Culture      SUP famitidine    DVT ppx-SCD

## 2020-01-18 NOTE — NURSING
Report rec'd; care assumed; pt to ICU via W/C; IVF's infusing at 200cc/hr; pt AAO; without n/v; denies pain; color good; #20g inserted via lt inner wrist.

## 2020-01-18 NOTE — NURSING
Patient serum glucose came back 699, NP ordered rate change in NS from 125mL/hr to 200mL/hr, 8 units of Insulin Aspart, NPO except or ice chips and sips with medications, and transfer to ICU for insulin drip administration.      8 units of insulin was administered and rate change of NS from 125mL/hr to 200mL/hr was initiated.    Report was called and given to Sharmila in ICU, Dianne GELLER will be the nurse.    Patient was taken to ICU with fluids via wheelchair along with personal items, no distress noted, handoff was made to Dianne GELLER in ICU.

## 2020-01-18 NOTE — NURSING
Toshanda RN night nurse called Elidia NP back about Blood glucose levels and NP ordered to give 10  Units of insulin per her orders as on MAR.

## 2020-01-18 NOTE — ASSESSMENT & PLAN NOTE
Tish Landeros is a 34 y.o. female who presents to the Emergency Department with sepsis and acute left pyelonephritis  This patient does have evidence of infective focus  My overall impression is sepsis with left acute pyelonephritis  Lab Results   Component Value Date    WBC 15.79 (H) 01/18/2020    HGB 11.9 (L) 01/18/2020    HCT 35.4 (L) 01/18/2020    MCV 95 01/18/2020     01/18/2020    Trend lactate level which is 2.5 now.  Aggressive IV fluid hydration initiated in the ER.  Continue IV Rocephin 1 g daily.  Follow microbiology results.  Vital signs post fluid administrations were-    Temp Readings from Last 1 Encounters:   01/18/20 99 °F (37.2 °C) (Oral)     BP Readings from Last 1 Encounters:   01/18/20 118/78     Pulse Readings from Last 1 Encounters:   01/18/20 98

## 2020-01-18 NOTE — PROGRESS NOTES
SW met with patient to complete a discharge planning assessment. Patient presents as alert and oriented x 4, pleasant, good eye contact, well groomed, recall good, concentration/judgement good, average intelligence, calm, communicative, cooperative and asking and answering questions appropriately. SW verified all information on demographic sheet is correct. Patient reports living with  and minor children and that she is independent with ADLs at this time and does drive. Patient reports  will transport pt home.    Patient reports does not have home health. Patient reports that she is not receiving outside resources. Patient reports having no medical equipment. Patient reports [facility] as pt's PCP and has Medicaid as their insurance. Patient reports receiving medications from [facility] on [address] and reports that she is able to afford medications at this time and at time of discharge. Patient reports that she is not on dialysis at this time. Patient reports that she is not receiving services with the coumadin clinic. Patient reports has not been admitted to the hospital within the last 30 days.    Patient reports does not have a Living Will or Healthcare Power of . Patient denies mental health issues.    Patient expressed no other needs at this time. SW remains available.         01/18/20 6171   Discharge Assessment   Assessment Type Discharge Planning Assessment   Confirmed/corrected address and phone number on facesheet? Yes   Assessment information obtained from? Patient   Prior to hospitilization cognitive status: Alert/Oriented   Prior to hospitalization functional status: Independent   Current cognitive status: Alert/Oriented   Current Functional Status: Independent   Lives With child(nicholas), dependent;spouse   Able to Return to Prior Arrangements yes   Is patient able to care for self after discharge? Yes   Who are your caregiver(s) and their phone number(s)? Eric Hickman  (mother) [phone]   Patient's perception of discharge disposition home or selfcare   Readmission Within the Last 30 Days no previous admission in last 30 days   Patient currently being followed by outpatient case management? No   Patient currently receives any other outside agency services? No   Equipment Currently Used at Home none   Part D Coverage Pt has Medicaid   Do you have any problems affording any of your prescribed medications? No   Is the patient taking medications as prescribed? yes   Does the patient have transportation home? Yes   Transportation Anticipated car, drives self;family or friend will provide   Dialysis Name and Scheduled days N/A   Does the patient receive services at the Coumadin Clinic? No   Discharge Plan A Home   DME Needed Upon Discharge  none   Patient/Family in Agreement with Plan yes

## 2020-01-19 LAB
ALBUMIN SERPL BCP-MCNC: 2.4 G/DL (ref 3.5–5.2)
ALP SERPL-CCNC: 137 U/L (ref 55–135)
ALT SERPL W/O P-5'-P-CCNC: 51 U/L (ref 10–44)
ANION GAP SERPL CALC-SCNC: 8 MMOL/L (ref 8–16)
ANION GAP SERPL CALC-SCNC: 8 MMOL/L (ref 8–16)
AST SERPL-CCNC: 123 U/L (ref 10–40)
BASOPHILS # BLD AUTO: 0.02 K/UL (ref 0–0.2)
BASOPHILS NFR BLD: 0.3 % (ref 0–1.9)
BILIRUB SERPL-MCNC: 0.3 MG/DL (ref 0.1–1)
BUN SERPL-MCNC: 5 MG/DL (ref 6–20)
BUN SERPL-MCNC: 5 MG/DL (ref 6–20)
CALCIUM SERPL-MCNC: 8.1 MG/DL (ref 8.7–10.5)
CALCIUM SERPL-MCNC: 8.1 MG/DL (ref 8.7–10.5)
CHLORIDE SERPL-SCNC: 109 MMOL/L (ref 95–110)
CHLORIDE SERPL-SCNC: 109 MMOL/L (ref 95–110)
CO2 SERPL-SCNC: 24 MMOL/L (ref 23–29)
CO2 SERPL-SCNC: 24 MMOL/L (ref 23–29)
CREAT SERPL-MCNC: 0.7 MG/DL (ref 0.5–1.4)
CREAT SERPL-MCNC: 0.7 MG/DL (ref 0.5–1.4)
DIFFERENTIAL METHOD: ABNORMAL
EOSINOPHIL # BLD AUTO: 0 K/UL (ref 0–0.5)
EOSINOPHIL NFR BLD: 0.3 % (ref 0–8)
ERYTHROCYTE [DISTWIDTH] IN BLOOD BY AUTOMATED COUNT: 12.4 % (ref 11.5–14.5)
EST. GFR  (AFRICAN AMERICAN): >60 ML/MIN/1.73 M^2
EST. GFR  (AFRICAN AMERICAN): >60 ML/MIN/1.73 M^2
EST. GFR  (NON AFRICAN AMERICAN): >60 ML/MIN/1.73 M^2
EST. GFR  (NON AFRICAN AMERICAN): >60 ML/MIN/1.73 M^2
GLUCOSE SERPL-MCNC: 59 MG/DL (ref 70–110)
GLUCOSE SERPL-MCNC: 59 MG/DL (ref 70–110)
HCT VFR BLD AUTO: 35.2 % (ref 37–48.5)
HGB BLD-MCNC: 11.4 G/DL (ref 12–16)
IMM GRANULOCYTES # BLD AUTO: 0.04 K/UL (ref 0–0.04)
LYMPHOCYTES # BLD AUTO: 0.5 K/UL (ref 1–4.8)
LYMPHOCYTES NFR BLD: 6.9 % (ref 18–48)
MCH RBC QN AUTO: 31 PG (ref 27–31)
MCHC RBC AUTO-ENTMCNC: 32.4 G/DL (ref 32–36)
MCV RBC AUTO: 96 FL (ref 82–98)
MONOCYTES # BLD AUTO: 0.8 K/UL (ref 0.3–1)
MONOCYTES NFR BLD: 10.5 % (ref 4–15)
NEUTROPHILS # BLD AUTO: 5.9 K/UL (ref 1.8–7.7)
NEUTROPHILS NFR BLD: 81.4 % (ref 38–73)
NRBC BLD-RTO: 0 /100 WBC
PLATELET # BLD AUTO: 171 K/UL (ref 150–350)
PMV BLD AUTO: 10.7 FL (ref 9.2–12.9)
POCT GLUCOSE: 127 MG/DL (ref 70–110)
POCT GLUCOSE: 158 MG/DL (ref 70–110)
POCT GLUCOSE: 183 MG/DL (ref 70–110)
POCT GLUCOSE: 245 MG/DL (ref 70–110)
POCT GLUCOSE: 48 MG/DL (ref 70–110)
POCT GLUCOSE: 54 MG/DL (ref 70–110)
POCT GLUCOSE: 67 MG/DL (ref 70–110)
POTASSIUM SERPL-SCNC: 3.4 MMOL/L (ref 3.5–5.1)
POTASSIUM SERPL-SCNC: 3.4 MMOL/L (ref 3.5–5.1)
PROT SERPL-MCNC: 5.7 G/DL (ref 6–8.4)
RBC # BLD AUTO: 3.68 M/UL (ref 4–5.4)
SODIUM SERPL-SCNC: 141 MMOL/L (ref 136–145)
SODIUM SERPL-SCNC: 141 MMOL/L (ref 136–145)
WBC # BLD AUTO: 7.22 K/UL (ref 3.9–12.7)

## 2020-01-19 PROCEDURE — 63600175 PHARM REV CODE 636 W HCPCS: Performed by: INTERNAL MEDICINE

## 2020-01-19 PROCEDURE — 25000003 PHARM REV CODE 250: Performed by: INTERNAL MEDICINE

## 2020-01-19 PROCEDURE — 12000002 HC ACUTE/MED SURGE SEMI-PRIVATE ROOM

## 2020-01-19 PROCEDURE — 94761 N-INVAS EAR/PLS OXIMETRY MLT: CPT

## 2020-01-19 PROCEDURE — C9113 INJ PANTOPRAZOLE SODIUM, VIA: HCPCS | Performed by: INTERNAL MEDICINE

## 2020-01-19 PROCEDURE — 80053 COMPREHEN METABOLIC PANEL: CPT

## 2020-01-19 PROCEDURE — 63600175 PHARM REV CODE 636 W HCPCS: Performed by: PHYSICIAN ASSISTANT

## 2020-01-19 PROCEDURE — 25000003 PHARM REV CODE 250: Performed by: PHYSICIAN ASSISTANT

## 2020-01-19 PROCEDURE — 63600175 PHARM REV CODE 636 W HCPCS: Performed by: NURSE PRACTITIONER

## 2020-01-19 PROCEDURE — 36415 COLL VENOUS BLD VENIPUNCTURE: CPT

## 2020-01-19 PROCEDURE — 85025 COMPLETE CBC W/AUTO DIFF WBC: CPT

## 2020-01-19 RX ORDER — METOCLOPRAMIDE HYDROCHLORIDE 5 MG/ML
10 INJECTION INTRAMUSCULAR; INTRAVENOUS EVERY 8 HOURS
Status: DISCONTINUED | OUTPATIENT
Start: 2020-01-19 | End: 2020-01-22 | Stop reason: HOSPADM

## 2020-01-19 RX ORDER — ONDANSETRON 2 MG/ML
4 INJECTION INTRAMUSCULAR; INTRAVENOUS EVERY 6 HOURS
Status: DISCONTINUED | OUTPATIENT
Start: 2020-01-19 | End: 2020-01-22 | Stop reason: HOSPADM

## 2020-01-19 RX ORDER — ACETAMINOPHEN 10 MG/ML
1000 INJECTION, SOLUTION INTRAVENOUS ONCE
Status: COMPLETED | OUTPATIENT
Start: 2020-01-19 | End: 2020-01-19

## 2020-01-19 RX ORDER — POTASSIUM CHLORIDE 20 MEQ/15ML
40 SOLUTION ORAL ONCE
Status: DISCONTINUED | OUTPATIENT
Start: 2020-01-19 | End: 2020-01-22 | Stop reason: SDUPTHER

## 2020-01-19 RX ADMIN — ACETAMINOPHEN 650 MG: 325 TABLET ORAL at 04:01

## 2020-01-19 RX ADMIN — ONDANSETRON 4 MG: 2 INJECTION INTRAMUSCULAR; INTRAVENOUS at 04:01

## 2020-01-19 RX ADMIN — PROMETHAZINE HYDROCHLORIDE 12.5 MG: 25 INJECTION INTRAMUSCULAR; INTRAVENOUS at 03:01

## 2020-01-19 RX ADMIN — ONDANSETRON 4 MG: 2 INJECTION INTRAMUSCULAR; INTRAVENOUS at 11:01

## 2020-01-19 RX ADMIN — DEXTROSE MONOHYDRATE 25 G: 25 INJECTION, SOLUTION INTRAVENOUS at 05:01

## 2020-01-19 RX ADMIN — METOCLOPRAMIDE 10 MG: 5 INJECTION, SOLUTION INTRAMUSCULAR; INTRAVENOUS at 09:01

## 2020-01-19 RX ADMIN — SODIUM CHLORIDE: 0.9 INJECTION, SOLUTION INTRAVENOUS at 02:01

## 2020-01-19 RX ADMIN — ONDANSETRON 4 MG: 2 INJECTION INTRAMUSCULAR; INTRAVENOUS at 06:01

## 2020-01-19 RX ADMIN — PROMETHAZINE HYDROCHLORIDE 12.5 MG: 25 INJECTION INTRAMUSCULAR; INTRAVENOUS at 07:01

## 2020-01-19 RX ADMIN — PROMETHAZINE HYDROCHLORIDE 12.5 MG: 25 INJECTION INTRAMUSCULAR; INTRAVENOUS at 12:01

## 2020-01-19 RX ADMIN — INSULIN ASPART 2 UNITS: 100 INJECTION, SOLUTION INTRAVENOUS; SUBCUTANEOUS at 09:01

## 2020-01-19 RX ADMIN — ACETAMINOPHEN 1000 MG: 10 INJECTION, SOLUTION INTRAVENOUS at 09:01

## 2020-01-19 RX ADMIN — METOCLOPRAMIDE 10 MG: 5 INJECTION, SOLUTION INTRAMUSCULAR; INTRAVENOUS at 02:01

## 2020-01-19 RX ADMIN — CEFTRIAXONE 1 G: 1 INJECTION, SOLUTION INTRAVENOUS at 04:01

## 2020-01-19 RX ADMIN — PANTOPRAZOLE SODIUM 40 MG: 40 INJECTION, POWDER, LYOPHILIZED, FOR SOLUTION INTRAVENOUS at 08:01

## 2020-01-19 NOTE — NURSING
Patient ran a temp of 101.5 oral that she received Tylenol for.  She was nauseated and got Zofran and Phenergan.  Noted hands were swelling, IVF were decreased to 75cc/hour per order.  AM blood sugar was <48 and patient received D50, follow up blood sugar was 127.  NP was notified, see orders.

## 2020-01-19 NOTE — NURSING
"Pt arrived to floor from ICU, telemetry applied (5709), pt c/o HA, medication administered as order, see MAR's, pt states "pain is control with current regimen, will continue to monitor, observe and note any changes, safety maintain  "

## 2020-01-19 NOTE — PLAN OF CARE
Pt aaox4 she has complained of nausea all day and emesis x2 today Iv patent and  iv fluids running with out difficulty. Telemetry shows sinus rhythm scds refused pt ambulates with out assist blood sugar range today  no inulin coverage pt refused to take scheduled dose. Bed low hourly rounds maintained call bell with in reach. Scheduled nausea meds scheduled

## 2020-01-20 PROBLEM — R10.11 RIGHT UPPER QUADRANT ABDOMINAL PAIN: Status: ACTIVE | Noted: 2020-01-20

## 2020-01-20 LAB
ALBUMIN SERPL BCP-MCNC: 2.5 G/DL (ref 3.5–5.2)
ALP SERPL-CCNC: 147 U/L (ref 55–135)
ALT SERPL W/O P-5'-P-CCNC: 59 U/L (ref 10–44)
ANION GAP SERPL CALC-SCNC: 13 MMOL/L (ref 8–16)
ANION GAP SERPL CALC-SCNC: 13 MMOL/L (ref 8–16)
AST SERPL-CCNC: 72 U/L (ref 10–40)
BACTERIA UR CULT: ABNORMAL
BASOPHILS # BLD AUTO: 0.02 K/UL (ref 0–0.2)
BASOPHILS NFR BLD: 0.3 % (ref 0–1.9)
BILIRUB SERPL-MCNC: 0.3 MG/DL (ref 0.1–1)
BUN SERPL-MCNC: 9 MG/DL (ref 6–20)
BUN SERPL-MCNC: 9 MG/DL (ref 6–20)
CALCIUM SERPL-MCNC: 8.4 MG/DL (ref 8.7–10.5)
CALCIUM SERPL-MCNC: 8.4 MG/DL (ref 8.7–10.5)
CHLORIDE SERPL-SCNC: 102 MMOL/L (ref 95–110)
CHLORIDE SERPL-SCNC: 102 MMOL/L (ref 95–110)
CO2 SERPL-SCNC: 21 MMOL/L (ref 23–29)
CO2 SERPL-SCNC: 21 MMOL/L (ref 23–29)
CREAT SERPL-MCNC: 0.9 MG/DL (ref 0.5–1.4)
CREAT SERPL-MCNC: 0.9 MG/DL (ref 0.5–1.4)
DIFFERENTIAL METHOD: ABNORMAL
EOSINOPHIL # BLD AUTO: 0 K/UL (ref 0–0.5)
EOSINOPHIL NFR BLD: 0 % (ref 0–8)
ERYTHROCYTE [DISTWIDTH] IN BLOOD BY AUTOMATED COUNT: 12.3 % (ref 11.5–14.5)
EST. GFR  (AFRICAN AMERICAN): >60 ML/MIN/1.73 M^2
EST. GFR  (AFRICAN AMERICAN): >60 ML/MIN/1.73 M^2
EST. GFR  (NON AFRICAN AMERICAN): >60 ML/MIN/1.73 M^2
EST. GFR  (NON AFRICAN AMERICAN): >60 ML/MIN/1.73 M^2
GLUCOSE SERPL-MCNC: 517 MG/DL (ref 70–110)
GLUCOSE SERPL-MCNC: 517 MG/DL (ref 70–110)
HCT VFR BLD AUTO: 37.9 % (ref 37–48.5)
HGB BLD-MCNC: 12 G/DL (ref 12–16)
IMM GRANULOCYTES # BLD AUTO: 0.1 K/UL (ref 0–0.04)
LIPASE SERPL-CCNC: <3 U/L (ref 4–60)
LYMPHOCYTES # BLD AUTO: 0.7 K/UL (ref 1–4.8)
LYMPHOCYTES NFR BLD: 9.7 % (ref 18–48)
MCH RBC QN AUTO: 29.9 PG (ref 27–31)
MCHC RBC AUTO-ENTMCNC: 31.7 G/DL (ref 32–36)
MCV RBC AUTO: 95 FL (ref 82–98)
MONOCYTES # BLD AUTO: 0.7 K/UL (ref 0.3–1)
MONOCYTES NFR BLD: 9.6 % (ref 4–15)
NEUTROPHILS # BLD AUTO: 5.5 K/UL (ref 1.8–7.7)
NEUTROPHILS NFR BLD: 79 % (ref 38–73)
NRBC BLD-RTO: 0 /100 WBC
PLATELET # BLD AUTO: 163 K/UL (ref 150–350)
PMV BLD AUTO: 10.6 FL (ref 9.2–12.9)
POCT GLUCOSE: 253 MG/DL (ref 70–110)
POCT GLUCOSE: 329 MG/DL (ref 70–110)
POCT GLUCOSE: 379 MG/DL (ref 70–110)
POCT GLUCOSE: 407 MG/DL (ref 70–110)
POCT GLUCOSE: 435 MG/DL (ref 70–110)
POCT GLUCOSE: 83 MG/DL (ref 70–110)
POCT GLUCOSE: 88 MG/DL (ref 70–110)
POTASSIUM SERPL-SCNC: 4.2 MMOL/L (ref 3.5–5.1)
POTASSIUM SERPL-SCNC: 4.2 MMOL/L (ref 3.5–5.1)
PROT SERPL-MCNC: 5.9 G/DL (ref 6–8.4)
RBC # BLD AUTO: 4.01 M/UL (ref 4–5.4)
SODIUM SERPL-SCNC: 136 MMOL/L (ref 136–145)
SODIUM SERPL-SCNC: 136 MMOL/L (ref 136–145)
WBC # BLD AUTO: 6.99 K/UL (ref 3.9–12.7)

## 2020-01-20 PROCEDURE — 85025 COMPLETE CBC W/AUTO DIFF WBC: CPT

## 2020-01-20 PROCEDURE — 80074 ACUTE HEPATITIS PANEL: CPT

## 2020-01-20 PROCEDURE — 80053 COMPREHEN METABOLIC PANEL: CPT

## 2020-01-20 PROCEDURE — 36415 COLL VENOUS BLD VENIPUNCTURE: CPT

## 2020-01-20 PROCEDURE — 25000003 PHARM REV CODE 250: Performed by: INTERNAL MEDICINE

## 2020-01-20 PROCEDURE — 12000002 HC ACUTE/MED SURGE SEMI-PRIVATE ROOM

## 2020-01-20 PROCEDURE — 83690 ASSAY OF LIPASE: CPT

## 2020-01-20 PROCEDURE — C9113 INJ PANTOPRAZOLE SODIUM, VIA: HCPCS | Performed by: INTERNAL MEDICINE

## 2020-01-20 PROCEDURE — 63600175 PHARM REV CODE 636 W HCPCS: Performed by: NURSE PRACTITIONER

## 2020-01-20 PROCEDURE — 94761 N-INVAS EAR/PLS OXIMETRY MLT: CPT

## 2020-01-20 PROCEDURE — 63600175 PHARM REV CODE 636 W HCPCS: Performed by: INTERNAL MEDICINE

## 2020-01-20 RX ADMIN — ONDANSETRON 4 MG: 2 INJECTION INTRAMUSCULAR; INTRAVENOUS at 06:01

## 2020-01-20 RX ADMIN — METOCLOPRAMIDE 10 MG: 5 INJECTION, SOLUTION INTRAMUSCULAR; INTRAVENOUS at 04:01

## 2020-01-20 RX ADMIN — PROMETHAZINE HYDROCHLORIDE 12.5 MG: 25 INJECTION INTRAMUSCULAR; INTRAVENOUS at 01:01

## 2020-01-20 RX ADMIN — CEFTRIAXONE 1 G: 1 INJECTION, SOLUTION INTRAVENOUS at 05:01

## 2020-01-20 RX ADMIN — PANTOPRAZOLE SODIUM 40 MG: 40 INJECTION, POWDER, LYOPHILIZED, FOR SOLUTION INTRAVENOUS at 08:01

## 2020-01-20 RX ADMIN — SODIUM CHLORIDE: 0.9 INJECTION, SOLUTION INTRAVENOUS at 04:01

## 2020-01-20 RX ADMIN — INSULIN ASPART 10 UNITS: 100 INJECTION, SOLUTION INTRAVENOUS; SUBCUTANEOUS at 11:01

## 2020-01-20 RX ADMIN — METOCLOPRAMIDE 10 MG: 5 INJECTION, SOLUTION INTRAMUSCULAR; INTRAVENOUS at 09:01

## 2020-01-20 RX ADMIN — PROMETHAZINE HYDROCHLORIDE 12.5 MG: 25 INJECTION INTRAMUSCULAR; INTRAVENOUS at 09:01

## 2020-01-20 RX ADMIN — ONDANSETRON 4 MG: 2 INJECTION INTRAMUSCULAR; INTRAVENOUS at 11:01

## 2020-01-20 RX ADMIN — INSULIN ASPART 10 UNITS: 100 INJECTION, SOLUTION INTRAVENOUS; SUBCUTANEOUS at 05:01

## 2020-01-20 RX ADMIN — METOCLOPRAMIDE 10 MG: 5 INJECTION, SOLUTION INTRAMUSCULAR; INTRAVENOUS at 05:01

## 2020-01-20 RX ADMIN — PROMETHAZINE HYDROCHLORIDE 12.5 MG: 25 INJECTION INTRAMUSCULAR; INTRAVENOUS at 08:01

## 2020-01-20 RX ADMIN — INSULIN DETEMIR 20 UNITS: 100 INJECTION, SOLUTION SUBCUTANEOUS at 12:01

## 2020-01-20 RX ADMIN — ONDANSETRON 4 MG: 2 INJECTION INTRAMUSCULAR; INTRAVENOUS at 05:01

## 2020-01-20 RX ADMIN — HYDROCODONE BITARTRATE AND ACETAMINOPHEN 1 TABLET: 5; 325 TABLET ORAL at 09:01

## 2020-01-20 NOTE — NURSING
Patient declined levemir last night because she was afraid her blood sugar would drop again.  AM accucheck >435/>407.  Labs just drawn for serum glucose.  10 units of novolog given per sliding scale.  NP notified.

## 2020-01-20 NOTE — PLAN OF CARE
Pt aaox4 she has complained of nausea and emesis all day. She is covered with several anti-emetics to keep nausea down. Scheduled for a ultrasound of abd to r/o gallbladder issues. Pt hourly rounds maintained, call bell with in reach, bed in low position. Iv patent telemetry shows normal sinus.

## 2020-01-20 NOTE — ASSESSMENT & PLAN NOTE
Will obtain right upper quadrant abdominal ultrasound.  Check lipase level.  Patient reports significant family history of gallbladder disease.  If gallbladder is negative,, patient may benefit with the EGD by GI specialist for symptomatic nausea and vomiting..  Continue proton pump inhibitor.

## 2020-01-20 NOTE — SUBJECTIVE & OBJECTIVE
Interval History:  T-max 101.4 degree F. patient's blood sugars continued to be labile suggesting brittle diabetes.  Urine cultures grew E coli species which are pansensitive.  Presently on intravenous ceftriaxone antibiotic therapy.  Hemoglobin A1c 10.2.  Patient reports improving appetite.  Patient is complaining of epigastric/right upper quadrant abdominal pain with frequent nausea and few bouts of emesis.  Patient is concerned about gallbladder disease since multiple family members have gallbladder disease history.    Review of Systems   Constitutional: Positive for appetite change and diaphoresis.   Gastrointestinal: Positive for abdominal pain, nausea and vomiting.   Genitourinary: Positive for flank pain.   All other systems reviewed and are negative.    Objective:     Vital Signs (Most Recent):  Temp: 99.8 °F (37.7 °C) (01/20/20 0802)  Pulse: 72 (01/20/20 0802)  Resp: 18 (01/20/20 0802)  BP: 129/80 (01/20/20 0802)  SpO2: 97 % (01/20/20 0802) Vital Signs (24h Range):  Temp:  [97.8 °F (36.6 °C)-101.4 °F (38.6 °C)] 99.8 °F (37.7 °C)  Pulse:  [60-87] 72  Resp:  [16-18] 18  SpO2:  [94 %-99 %] 97 %  BP: (129-163)/(80-94) 129/80     Weight: 87.9 kg (193 lb 12.5 oz)  Body mass index is 34.33 kg/m².    Intake/Output Summary (Last 24 hours) at 1/20/2020 1039  Last data filed at 1/20/2020 0417  Gross per 24 hour   Intake 3299.58 ml   Output 400 ml   Net 2899.58 ml      Physical Exam   Constitutional: She is oriented to person, place, and time. She appears well-developed.   HENT:   Head: Normocephalic and atraumatic.   Dry mucous membranes   Eyes: Pupils are equal, round, and reactive to light. Conjunctivae are normal.   Neck: Neck supple. No JVD present. No thyromegaly present.   Cardiovascular: Normal rate and regular rhythm. Exam reveals no gallop and no friction rub.   No murmur heard.  Pulmonary/Chest: Effort normal and breath sounds normal.   Abdominal: Soft. Bowel sounds are normal. She exhibits no distension and  no mass. There is tenderness.   Right upper quadrant/epigastric tenderness present without any peritoneal signs or guarding.   Musculoskeletal: Normal range of motion. She exhibits no edema.   Neurological: She is oriented to person, place, and time. No cranial nerve deficit.   Skin: Skin is warm and dry.   Psychiatric: Her behavior is normal.       Significant Labs:   CBC:   Recent Labs   Lab 01/19/20  0449 01/20/20  0510   WBC 7.22 6.99   HGB 11.4* 12.0   HCT 35.2* 37.9    163     CMP:   Recent Labs   Lab 01/18/20  1154 01/19/20  0449 01/20/20  0510    141  141 136  136   K 3.9 3.4*  3.4* 4.2  4.2    109  109 102  102   CO2 22* 24  24 21*  21*   * 59*  59* 517*  517*   BUN 9 5*  5* 9  9   CREATININE 0.9 0.7  0.7 0.9  0.9   CALCIUM 8.7 8.1*  8.1* 8.4*  8.4*   PROT  --  5.7* 5.9*   ALBUMIN  --  2.4* 2.5*   BILITOT  --  0.3 0.3   ALKPHOS  --  137* 147*   AST  --  123* 72*   ALT  --  51* 59*   ANIONGAP 11 8  8 13  13   EGFRNONAA >60 >60  >60 >60  >60     Microbiology Results (last 7 days)     Procedure Component Value Units Date/Time    Urine culture [[id number]]  (Abnormal)  (Susceptibility) Collected:  01/17/20 1232    Order Status:  Completed Specimen:  Urine Updated:  01/20/20 0052     Urine Culture, Routine ESCHERICHIA COLI  > 100,000 cfu/ml  No other significant isolate      Narrative:       Preferred Collection Type->Urine, Clean Catch    Blood culture x two cultures. Draw prior to antibiotics. [[id number]] Collected:  01/17/20 1604    Order Status:  Completed Specimen:  Blood from Peripheral, Right  Hand Updated:  01/19/20 2312     Blood Culture, Routine No Growth to date      No Growth to date      No Growth to date    Narrative:       Aerobic and anaerobic    Blood culture x two cultures. Draw prior to antibiotics. [[id number]] Collected:  01/17/20 1604    Order Status:  Completed Specimen:  Blood from Peripheral, Left  Arm Updated:  01/19/20 2312     Blood Culture,  Routine No Growth to date      No Growth to date      No Growth to date    Narrative:       Aerobic and anaerobic    Influenza A & B by Molecular [[id number]] Collected:  01/17/20 1225    Order Status:  Completed Specimen:  Nasopharyngeal Swab Updated:  01/17/20 1403     Influenza A, Molecular Negative     Influenza B, Molecular Negative     Flu A & B Source Nasal swab        Significant Imaging:  CT abdomen and pelvis with contrast:  Abnormal appearance of the left kidney compatible with pyelonephritis.  No hydronephrosis.

## 2020-01-20 NOTE — PROGRESS NOTES
[facility] Hospital Medicine  Progress Note    Patient Name: Tish Landeros  MRN: [id number]  Patient Class: IP- Inpatient   Admission Date: 1/17/2020  Length of Stay: 2 days  Attending Physician: Christa Lynn MD  Primary Care Provider: Primary Doctor No        Subjective:     Principal Problem:Sepsis        HPI:  Patient is a 34-year-old type 1 diabetic who is being admitted to Hospital Medicine from [facility] Emergency Room under observation status with complaint of epigastric pain associated with nausea and vomiting for 1 day.  Patient reports associated fever with diaphoresis, dizziness, headache and left flank discomfort.  Patient denies any hematuria or pyuria.  Patient denies any history of nephrolithiasis.  Patient reports higher than usual blood sugars lately.  Patient is prior history of diabetic ketoacidosis.  No urinary complaints reported.  Patient denies any hematemesis or melena.  No sick contact or recent travel history reported.        Overview/Hospital Course:  No notes on file    Interval History:  T-max 101.4 degree F. patient's blood sugars continued to be labile suggesting brittle diabetes.  Urine cultures grew E coli species which are pansensitive.  Presently on intravenous ceftriaxone antibiotic therapy.  Hemoglobin A1c 10.2.  Patient reports improving appetite.  Patient is complaining of epigastric/right upper quadrant abdominal pain with frequent nausea and few bouts of emesis.  Patient is concerned about gallbladder disease since multiple family members have gallbladder disease history.    Review of Systems   Constitutional: Positive for appetite change and diaphoresis.   Gastrointestinal: Positive for abdominal pain, nausea and vomiting.   Genitourinary: Positive for flank pain.   All other systems reviewed and are negative.    Objective:     Vital Signs (Most Recent):  Temp: 99.8 °F (37.7 °C) (01/20/20 0802)  Pulse: 72 (01/20/20  0802)  Resp: 18 (01/20/20 0802)  BP: 129/80 (01/20/20 0802)  SpO2: 97 % (01/20/20 0802) Vital Signs (24h Range):  Temp:  [97.8 °F (36.6 °C)-101.4 °F (38.6 °C)] 99.8 °F (37.7 °C)  Pulse:  [60-87] 72  Resp:  [16-18] 18  SpO2:  [94 %-99 %] 97 %  BP: (129-163)/(80-94) 129/80     Weight: 87.9 kg (193 lb 12.5 oz)  Body mass index is 34.33 kg/m².    Intake/Output Summary (Last 24 hours) at 1/20/2020 1039  Last data filed at 1/20/2020 0417  Gross per 24 hour   Intake 3299.58 ml   Output 400 ml   Net 2899.58 ml      Physical Exam   Constitutional: She is oriented to person, place, and time. She appears well-developed.   HENT:   Head: Normocephalic and atraumatic.   Dry mucous membranes   Eyes: Pupils are equal, round, and reactive to light. Conjunctivae are normal.   Neck: Neck supple. No JVD present. No thyromegaly present.   Cardiovascular: Normal rate and regular rhythm. Exam reveals no gallop and no friction rub.   No murmur heard.  Pulmonary/Chest: Effort normal and breath sounds normal.   Abdominal: Soft. Bowel sounds are normal. She exhibits no distension and no mass. There is tenderness.   Right upper quadrant/epigastric tenderness present without any peritoneal signs or guarding.   Musculoskeletal: Normal range of motion. She exhibits no edema.   Neurological: She is oriented to person, place, and time. No cranial nerve deficit.   Skin: Skin is warm and dry.   Psychiatric: Her behavior is normal.       Significant Labs:   CBC:   Recent Labs   Lab 01/19/20  0449 01/20/20  0510   WBC 7.22 6.99   HGB 11.4* 12.0   HCT 35.2* 37.9    163     CMP:   Recent Labs   Lab 01/18/20  1154 01/19/20  0449 01/20/20  0510    141  141 136  136   K 3.9 3.4*  3.4* 4.2  4.2    109  109 102  102   CO2 22* 24  24 21*  21*   * 59*  59* 517*  517*   BUN 9 5*  5* 9  9   CREATININE 0.9 0.7  0.7 0.9  0.9   CALCIUM 8.7 8.1*  8.1* 8.4*  8.4*   PROT  --  5.7* 5.9*   ALBUMIN  --  2.4* 2.5*   BILITOT  --   0.3 0.3   ALKPHOS  --  137* 147*   AST  --  123* 72*   ALT  --  51* 59*   ANIONGAP 11 8  8 13  13   EGFRNONAA >60 >60  >60 >60  >60     Microbiology Results (last 7 days)     Procedure Component Value Units Date/Time    Urine culture [[id number]]  (Abnormal)  (Susceptibility) Collected:  01/17/20 1232    Order Status:  Completed Specimen:  Urine Updated:  01/20/20 0052     Urine Culture, Routine ESCHERICHIA COLI  > 100,000 cfu/ml  No other significant isolate      Narrative:       Preferred Collection Type->Urine, Clean Catch    Blood culture x two cultures. Draw prior to antibiotics. [[id number]] Collected:  01/17/20 1604    Order Status:  Completed Specimen:  Blood from Peripheral, Right  Hand Updated:  01/19/20 2312     Blood Culture, Routine No Growth to date      No Growth to date      No Growth to date    Narrative:       Aerobic and anaerobic    Blood culture x two cultures. Draw prior to antibiotics. [[id number]] Collected:  01/17/20 1604    Order Status:  Completed Specimen:  Blood from Peripheral, Left  Arm Updated:  01/19/20 2312     Blood Culture, Routine No Growth to date      No Growth to date      No Growth to date    Narrative:       Aerobic and anaerobic    Influenza A & B by Molecular [[id number]] Collected:  01/17/20 1225    Order Status:  Completed Specimen:  Nasopharyngeal Swab Updated:  01/17/20 1403     Influenza A, Molecular Negative     Influenza B, Molecular Negative     Flu A & B Source Nasal swab        Significant Imaging:  CT abdomen and pelvis with contrast:  Abnormal appearance of the left kidney compatible with pyelonephritis.  No hydronephrosis.      Assessment/Plan:      * Sepsis  Tish Landeros is a 34 y.o. female who presents to the Emergency Department with sepsis and acute left pyelonephritis  This patient does have evidence of infective focus  My overall impression is sepsis with left acute pyelonephritis  Lab Results   Component Value Date    WBC 15.79 (H) 01/18/2020     HGB 11.9 (L) 01/18/2020    HCT 35.4 (L) 01/18/2020    MCV 95 01/18/2020     01/18/2020    Trend lactate level which is 2.5 now.  Aggressive IV fluid hydration initiated in the ER.  Continue IV Rocephin 1 g daily.  Follow microbiology results.  Vital signs post fluid administrations were-    Temp Readings from Last 1 Encounters:   01/18/20 99 °F (37.2 °C) (Oral)     BP Readings from Last 1 Encounters:   01/18/20 118/78     Pulse Readings from Last 1 Encounters:   01/18/20 98           Right upper quadrant abdominal pain  Will obtain right upper quadrant abdominal ultrasound.  Check lipase level.  Patient reports significant family history of gallbladder disease.  If gallbladder is negative,, patient may benefit with the EGD by GI specialist for symptomatic nausea and vomiting..  Continue proton pump inhibitor.      Pyelonephritis  Follow microbiology results.  Continue IV Rocephin 1 g daily.      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  Presently patient is on 18 units subcu Levemir twice daily with pre meal insulin around 10 units before meals.  Patient admits recent uncontrolled diabetes with elevated hemoglobin A1c 10.2.    Transaminitis  .  Discontinue Tylenol follow acute hepatitis panel  Follow right upper quadrant ultrasound to evaluate a better biliary disease..      VTE Risk Mitigation (From admission, onward)         Ordered     IP VTE LOW RISK PATIENT  Once      01/17/20 1841     Place CARRIE hose  Until discontinued      01/17/20 1841     Place sequential compression device  Until discontinued      01/17/20 1808                      Christa Lynn MD  Department of Hospital Medicine   [facility]

## 2020-01-21 LAB
ALBUMIN SERPL BCP-MCNC: 2.1 G/DL (ref 3.5–5.2)
ALP SERPL-CCNC: 101 U/L (ref 55–135)
ALT SERPL W/O P-5'-P-CCNC: 32 U/L (ref 10–44)
ANION GAP SERPL CALC-SCNC: 7 MMOL/L (ref 8–16)
ANION GAP SERPL CALC-SCNC: 7 MMOL/L (ref 8–16)
AST SERPL-CCNC: 20 U/L (ref 10–40)
BASOPHILS # BLD AUTO: 0.03 K/UL (ref 0–0.2)
BASOPHILS NFR BLD: 0.5 % (ref 0–1.9)
BILIRUB SERPL-MCNC: 0.2 MG/DL (ref 0.1–1)
BUN SERPL-MCNC: 6 MG/DL (ref 6–20)
BUN SERPL-MCNC: 6 MG/DL (ref 6–20)
CALCIUM SERPL-MCNC: 7.7 MG/DL (ref 8.7–10.5)
CALCIUM SERPL-MCNC: 7.7 MG/DL (ref 8.7–10.5)
CHLORIDE SERPL-SCNC: 105 MMOL/L (ref 95–110)
CHLORIDE SERPL-SCNC: 105 MMOL/L (ref 95–110)
CO2 SERPL-SCNC: 27 MMOL/L (ref 23–29)
CO2 SERPL-SCNC: 27 MMOL/L (ref 23–29)
CREAT SERPL-MCNC: 0.8 MG/DL (ref 0.5–1.4)
CREAT SERPL-MCNC: 0.8 MG/DL (ref 0.5–1.4)
DIFFERENTIAL METHOD: ABNORMAL
EOSINOPHIL # BLD AUTO: 0.1 K/UL (ref 0–0.5)
EOSINOPHIL NFR BLD: 0.9 % (ref 0–8)
ERYTHROCYTE [DISTWIDTH] IN BLOOD BY AUTOMATED COUNT: 12.3 % (ref 11.5–14.5)
EST. GFR  (AFRICAN AMERICAN): >60 ML/MIN/1.73 M^2
EST. GFR  (AFRICAN AMERICAN): >60 ML/MIN/1.73 M^2
EST. GFR  (NON AFRICAN AMERICAN): >60 ML/MIN/1.73 M^2
EST. GFR  (NON AFRICAN AMERICAN): >60 ML/MIN/1.73 M^2
GLUCOSE SERPL-MCNC: 206 MG/DL (ref 70–110)
GLUCOSE SERPL-MCNC: 206 MG/DL (ref 70–110)
HAV IGM SERPL QL IA: NEGATIVE
HBV CORE IGM SERPL QL IA: NEGATIVE
HBV SURFACE AG SERPL QL IA: NEGATIVE
HCT VFR BLD AUTO: 32.8 % (ref 37–48.5)
HCV AB SERPL QL IA: NEGATIVE
HGB BLD-MCNC: 11.1 G/DL (ref 12–16)
IMM GRANULOCYTES # BLD AUTO: 0.03 K/UL (ref 0–0.04)
LYMPHOCYTES # BLD AUTO: 1.7 K/UL (ref 1–4.8)
LYMPHOCYTES NFR BLD: 26.2 % (ref 18–48)
MCH RBC QN AUTO: 31.3 PG (ref 27–31)
MCHC RBC AUTO-ENTMCNC: 33.8 G/DL (ref 32–36)
MCV RBC AUTO: 92 FL (ref 82–98)
MONOCYTES # BLD AUTO: 0.8 K/UL (ref 0.3–1)
MONOCYTES NFR BLD: 12.2 % (ref 4–15)
NEUTROPHILS # BLD AUTO: 3.9 K/UL (ref 1.8–7.7)
NEUTROPHILS NFR BLD: 59.7 % (ref 38–73)
NRBC BLD-RTO: 0 /100 WBC
PLATELET # BLD AUTO: 186 K/UL (ref 150–350)
PMV BLD AUTO: 9.9 FL (ref 9.2–12.9)
POCT GLUCOSE: 118 MG/DL (ref 70–110)
POCT GLUCOSE: 120 MG/DL (ref 70–110)
POCT GLUCOSE: 146 MG/DL (ref 70–110)
POCT GLUCOSE: 177 MG/DL (ref 70–110)
POCT GLUCOSE: 214 MG/DL (ref 70–110)
POCT GLUCOSE: >500 MG/DL (ref 70–110)
POTASSIUM SERPL-SCNC: 3.6 MMOL/L (ref 3.5–5.1)
POTASSIUM SERPL-SCNC: 3.6 MMOL/L (ref 3.5–5.1)
PROT SERPL-MCNC: 5 G/DL (ref 6–8.4)
RBC # BLD AUTO: 3.55 M/UL (ref 4–5.4)
SODIUM SERPL-SCNC: 139 MMOL/L (ref 136–145)
SODIUM SERPL-SCNC: 139 MMOL/L (ref 136–145)
WBC # BLD AUTO: 6.49 K/UL (ref 3.9–12.7)

## 2020-01-21 PROCEDURE — 63600175 PHARM REV CODE 636 W HCPCS: Performed by: NURSE PRACTITIONER

## 2020-01-21 PROCEDURE — 25000003 PHARM REV CODE 250: Performed by: INTERNAL MEDICINE

## 2020-01-21 PROCEDURE — C9113 INJ PANTOPRAZOLE SODIUM, VIA: HCPCS | Performed by: INTERNAL MEDICINE

## 2020-01-21 PROCEDURE — 63600175 PHARM REV CODE 636 W HCPCS: Performed by: INTERNAL MEDICINE

## 2020-01-21 PROCEDURE — 85025 COMPLETE CBC W/AUTO DIFF WBC: CPT

## 2020-01-21 PROCEDURE — 80053 COMPREHEN METABOLIC PANEL: CPT

## 2020-01-21 PROCEDURE — 12000002 HC ACUTE/MED SURGE SEMI-PRIVATE ROOM

## 2020-01-21 PROCEDURE — 36415 COLL VENOUS BLD VENIPUNCTURE: CPT

## 2020-01-21 RX ADMIN — PROMETHAZINE HYDROCHLORIDE 12.5 MG: 25 INJECTION INTRAMUSCULAR; INTRAVENOUS at 08:01

## 2020-01-21 RX ADMIN — HYDROCODONE BITARTRATE AND ACETAMINOPHEN 1 TABLET: 5; 325 TABLET ORAL at 05:01

## 2020-01-21 RX ADMIN — METOCLOPRAMIDE 10 MG: 5 INJECTION, SOLUTION INTRAMUSCULAR; INTRAVENOUS at 05:01

## 2020-01-21 RX ADMIN — ONDANSETRON 4 MG: 2 INJECTION INTRAMUSCULAR; INTRAVENOUS at 11:01

## 2020-01-21 RX ADMIN — INSULIN DETEMIR 20 UNITS: 100 INJECTION, SOLUTION SUBCUTANEOUS at 08:01

## 2020-01-21 RX ADMIN — METOCLOPRAMIDE 10 MG: 5 INJECTION, SOLUTION INTRAMUSCULAR; INTRAVENOUS at 10:01

## 2020-01-21 RX ADMIN — CEFTRIAXONE 1 G: 1 INJECTION, SOLUTION INTRAVENOUS at 05:01

## 2020-01-21 RX ADMIN — INSULIN ASPART 4 UNITS: 100 INJECTION, SOLUTION INTRAVENOUS; SUBCUTANEOUS at 06:01

## 2020-01-21 RX ADMIN — SODIUM CHLORIDE: 0.9 INJECTION, SOLUTION INTRAVENOUS at 05:01

## 2020-01-21 RX ADMIN — ONDANSETRON 4 MG: 2 INJECTION INTRAMUSCULAR; INTRAVENOUS at 05:01

## 2020-01-21 RX ADMIN — ONDANSETRON 4 MG: 2 INJECTION INTRAMUSCULAR; INTRAVENOUS at 12:01

## 2020-01-21 RX ADMIN — METOCLOPRAMIDE 10 MG: 5 INJECTION, SOLUTION INTRAMUSCULAR; INTRAVENOUS at 03:01

## 2020-01-21 RX ADMIN — PANTOPRAZOLE SODIUM 40 MG: 40 INJECTION, POWDER, LYOPHILIZED, FOR SOLUTION INTRAVENOUS at 08:01

## 2020-01-21 RX ADMIN — ONDANSETRON 4 MG: 2 INJECTION INTRAMUSCULAR; INTRAVENOUS at 06:01

## 2020-01-21 NOTE — SUBJECTIVE & OBJECTIVE
Interval History:  T-max 100.6 F. Patient's blood sugars continued to be labile suggesting brittle diabetes.  Urine cultures grew E coli species which are pansensitive.  Presently on intravenous ceftriaxone antibiotic therapy.  Hemoglobin A1c 10.2.  Patient appears frustrated and continues to complain of persistent nausea and occasional vomiting with right upper quadrant abdominal pain.    Review of Systems   Constitutional: Positive for appetite change and diaphoresis.   Gastrointestinal: Positive for abdominal pain, nausea and vomiting.   Genitourinary: Positive for flank pain.   All other systems reviewed and are negative.    Objective:     Vital Signs (Most Recent):  Temp: 99.5 °F (37.5 °C) (01/21/20 0745)  Pulse: 71 (01/21/20 0745)  Resp: 16 (01/21/20 0745)  BP: 131/83 (01/21/20 0745)  SpO2: 96 % (01/21/20 0745) Vital Signs (24h Range):  Temp:  [98.1 °F (36.7 °C)-100.6 °F (38.1 °C)] 99.5 °F (37.5 °C)  Pulse:  [] 71  Resp:  [16-18] 16  SpO2:  [96 %-99 %] 96 %  BP: ()/(55-83) 131/83     Weight: 87.9 kg (193 lb 12.5 oz)  Body mass index is 34.33 kg/m².    Intake/Output Summary (Last 24 hours) at 1/21/2020 0955  Last data filed at 1/21/2020 0600  Gross per 24 hour   Intake 2518.75 ml   Output 350 ml   Net 2168.75 ml      Physical Exam   Constitutional: She is oriented to person, place, and time. She appears well-developed.   HENT:   Head: Normocephalic and atraumatic.   Dry mucous membranes   Eyes: Pupils are equal, round, and reactive to light. Conjunctivae are normal.   Neck: Neck supple. No JVD present. No thyromegaly present.   Cardiovascular: Normal rate and regular rhythm. Exam reveals no gallop and no friction rub.   No murmur heard.  Pulmonary/Chest: Effort normal and breath sounds normal.   Abdominal: Soft. Bowel sounds are normal. She exhibits no distension and no mass. There is tenderness.   Right upper quadrant/epigastric tenderness present without any peritoneal signs or guarding.    Musculoskeletal: Normal range of motion. She exhibits no edema.   Neurological: She is oriented to person, place, and time. No cranial nerve deficit.   Skin: Skin is warm and dry.   Psychiatric: Her behavior is normal.       Significant Labs:   CBC:   Recent Labs   Lab 01/20/20  0510 01/21/20  0504   WBC 6.99 6.49   HGB 12.0 11.1*   HCT 37.9 32.8*    186     CMP:   Recent Labs   Lab 01/20/20  0510 01/21/20  0504     136 139  139   K 4.2  4.2 3.6  3.6     102 105  105   CO2 21*  21* 27  27   *  517* 206*  206*   BUN 9  9 6  6   CREATININE 0.9  0.9 0.8  0.8   CALCIUM 8.4*  8.4* 7.7*  7.7*   PROT 5.9* 5.0*   ALBUMIN 2.5* 2.1*   BILITOT 0.3 0.2   ALKPHOS 147* 101   AST 72* 20   ALT 59* 32   ANIONGAP 13  13 7*  7*   EGFRNONAA >60  >60 >60  >60     Microbiology Results (last 7 days)     Procedure Component Value Units Date/Time    Blood culture x two cultures. Draw prior to antibiotics. [[id number]] Collected:  01/17/20 1604    Order Status:  Completed Specimen:  Blood from Peripheral, Left  Arm Updated:  01/20/20 2312     Blood Culture, Routine No Growth to date      No Growth to date      No Growth to date      No Growth to date    Narrative:       Aerobic and anaerobic    Blood culture x two cultures. Draw prior to antibiotics. [[id number]] Collected:  01/17/20 1604    Order Status:  Completed Specimen:  Blood from Peripheral, Right  Hand Updated:  01/20/20 2312     Blood Culture, Routine No Growth to date      No Growth to date      No Growth to date      No Growth to date    Narrative:       Aerobic and anaerobic    Urine culture [[id number]]  (Abnormal)  (Susceptibility) Collected:  01/17/20 1232    Order Status:  Completed Specimen:  Urine Updated:  01/20/20 0052     Urine Culture, Routine ESCHERICHIA COLI  > 100,000 cfu/ml  No other significant isolate      Narrative:       Preferred Collection Type->Urine, Clean Catch    Influenza A & B by Molecular [[id number]]  Collected:  01/17/20 1225    Order Status:  Completed Specimen:  Nasopharyngeal Swab Updated:  01/17/20 1403     Influenza A, Molecular Negative     Influenza B, Molecular Negative     Flu A & B Source Nasal swab        Significant Imaging:  CT abdomen and pelvis with contrast:  Abnormal appearance of the left kidney compatible with pyelonephritis.  No hydronephrosis.    US abdomen:  1. Diffuse gallbladder wall thickening and gallbladder sludge.  No sonographic Duckworth sign or shadowing gallstones.  Diffuse bladder wall gallbladder wall thickening can be a nonspecific finding and can be seen in the setting of intrinsic liver disease.  Acalculous cholecystitis would be difficult to entirely exclude but would warrant an appropriate clinical suspicion.  A HIDA scan could be considered for additional evaluation as deemed clinically appropriate..  2. Suggestion of mildly echogenic periportal triads, a nonspecific imaging finding but 1 which can be seen in the setting of hepatic inflammation such is hepatitis.  3. No evidence of biliary obstruction.

## 2020-01-21 NOTE — PLAN OF CARE
Pt alert and oriented. Up with stand by assistance. Iv fluids infusing. Throwing up multiple times. Given prn and scheduled nausea medication. Prn pain medication given. No new symptoms. Scan done. Safety maintained this shift. Education given. Will continue to monitor.

## 2020-01-21 NOTE — ASSESSMENT & PLAN NOTE
Right upper quadrant abdominal ultrasound results reviewed with the patient's suggesting acalculous cholecystitis.  Obtain HIDA scan and consult general surgeon.  Maintain NPO and continue supportive care with IV fluid hydration, antiemetics and intravenous narcotics as needed.  Continue proton pump inhibitor.

## 2020-01-21 NOTE — PLAN OF CARE
Patient AAOx4. VSS, afebrile. Tele # [id number] monitored. BG monitored; no SSI has been needed.  Intermittent nausea/pain treated per orders. No vomiting this shift. Ambulatory to BR with standby assist. Hourly/q2 rounds done to promote safety. No complaints noted at this time. Patient resting quietly throughout majority of shift. Bed in lowest position, call light within reach. Continuing to monitor.

## 2020-01-21 NOTE — PROGRESS NOTES
[facility] Hospital Medicine  Progress Note    Patient Name: Tish Landeros  MRN: [id number]  Patient Class: IP- Inpatient   Admission Date: 1/17/2020  Length of Stay: 3 days  Attending Physician: Christa Lynn MD  Primary Care Provider: Primary Doctor No        Subjective:     Principal Problem:Sepsis        HPI:  Patient is a 34-year-old type 1 diabetic who is being admitted to Hospital Medicine from [facility] Emergency Room under observation status with complaint of epigastric pain associated with nausea and vomiting for 1 day.  Patient reports associated fever with diaphoresis, dizziness, headache and left flank discomfort.  Patient denies any hematuria or pyuria.  Patient denies any history of nephrolithiasis.  Patient reports higher than usual blood sugars lately.  Patient is prior history of diabetic ketoacidosis.  No urinary complaints reported.  Patient denies any hematemesis or melena.  No sick contact or recent travel history reported.        Overview/Hospital Course:  No notes on file    Interval History:  T-max 100.6 F. Patient's blood sugars continued to be labile suggesting brittle diabetes.  Urine cultures grew E coli species which are pansensitive.  Presently on intravenous ceftriaxone antibiotic therapy.  Hemoglobin A1c 10.2.  Patient appears frustrated and continues to complain of persistent nausea and occasional vomiting with right upper quadrant abdominal pain.    Review of Systems   Constitutional: Positive for appetite change and diaphoresis.   Gastrointestinal: Positive for abdominal pain, nausea and vomiting.   Genitourinary: Positive for flank pain.   All other systems reviewed and are negative.    Objective:     Vital Signs (Most Recent):  Temp: 99.5 °F (37.5 °C) (01/21/20 0745)  Pulse: 71 (01/21/20 0745)  Resp: 16 (01/21/20 0745)  BP: 131/83 (01/21/20 0745)  SpO2: 96 % (01/21/20 0745) Vital Signs (24h Range):  Temp:  [98.1 °F (36.7 °C)-100.6  °F (38.1 °C)] 99.5 °F (37.5 °C)  Pulse:  [] 71  Resp:  [16-18] 16  SpO2:  [96 %-99 %] 96 %  BP: ()/(55-83) 131/83     Weight: 87.9 kg (193 lb 12.5 oz)  Body mass index is 34.33 kg/m².    Intake/Output Summary (Last 24 hours) at 1/21/2020 0955  Last data filed at 1/21/2020 0600  Gross per 24 hour   Intake 2518.75 ml   Output 350 ml   Net 2168.75 ml      Physical Exam   Constitutional: She is oriented to person, place, and time. She appears well-developed.   HENT:   Head: Normocephalic and atraumatic.   Dry mucous membranes   Eyes: Pupils are equal, round, and reactive to light. Conjunctivae are normal.   Neck: Neck supple. No JVD present. No thyromegaly present.   Cardiovascular: Normal rate and regular rhythm. Exam reveals no gallop and no friction rub.   No murmur heard.  Pulmonary/Chest: Effort normal and breath sounds normal.   Abdominal: Soft. Bowel sounds are normal. She exhibits no distension and no mass. There is tenderness.   Right upper quadrant/epigastric tenderness present without any peritoneal signs or guarding.   Musculoskeletal: Normal range of motion. She exhibits no edema.   Neurological: She is oriented to person, place, and time. No cranial nerve deficit.   Skin: Skin is warm and dry.   Psychiatric: Her behavior is normal.       Significant Labs:   CBC:   Recent Labs   Lab 01/20/20  0510 01/21/20  0504   WBC 6.99 6.49   HGB 12.0 11.1*   HCT 37.9 32.8*    186     CMP:   Recent Labs   Lab 01/20/20  0510 01/21/20  0504     136 139  139   K 4.2  4.2 3.6  3.6     102 105  105   CO2 21*  21* 27  27   *  517* 206*  206*   BUN 9  9 6  6   CREATININE 0.9  0.9 0.8  0.8   CALCIUM 8.4*  8.4* 7.7*  7.7*   PROT 5.9* 5.0*   ALBUMIN 2.5* 2.1*   BILITOT 0.3 0.2   ALKPHOS 147* 101   AST 72* 20   ALT 59* 32   ANIONGAP 13  13 7*  7*   EGFRNONAA >60  >60 >60  >60     Microbiology Results (last 7 days)     Procedure Component Value Units Date/Time    Blood  culture x two cultures. Draw prior to antibiotics. [[id number]] Collected:  01/17/20 1604    Order Status:  Completed Specimen:  Blood from Peripheral, Left  Arm Updated:  01/20/20 2312     Blood Culture, Routine No Growth to date      No Growth to date      No Growth to date      No Growth to date    Narrative:       Aerobic and anaerobic    Blood culture x two cultures. Draw prior to antibiotics. [[id number]] Collected:  01/17/20 1604    Order Status:  Completed Specimen:  Blood from Peripheral, Right  Hand Updated:  01/20/20 2312     Blood Culture, Routine No Growth to date      No Growth to date      No Growth to date      No Growth to date    Narrative:       Aerobic and anaerobic    Urine culture [[id number]]  (Abnormal)  (Susceptibility) Collected:  01/17/20 1232    Order Status:  Completed Specimen:  Urine Updated:  01/20/20 0052     Urine Culture, Routine ESCHERICHIA COLI  > 100,000 cfu/ml  No other significant isolate      Narrative:       Preferred Collection Type->Urine, Clean Catch    Influenza A & B by Molecular [[id number]] Collected:  01/17/20 1225    Order Status:  Completed Specimen:  Nasopharyngeal Swab Updated:  01/17/20 1403     Influenza A, Molecular Negative     Influenza B, Molecular Negative     Flu A & B Source Nasal swab        Significant Imaging:  CT abdomen and pelvis with contrast:  Abnormal appearance of the left kidney compatible with pyelonephritis.  No hydronephrosis.    US abdomen:  1. Diffuse gallbladder wall thickening and gallbladder sludge.  No sonographic Duckworth sign or shadowing gallstones.  Diffuse bladder wall gallbladder wall thickening can be a nonspecific finding and can be seen in the setting of intrinsic liver disease.  Acalculous cholecystitis would be difficult to entirely exclude but would warrant an appropriate clinical suspicion.  A HIDA scan could be considered for additional evaluation as deemed clinically appropriate..  2. Suggestion of mildly echogenic periportal  triads, a nonspecific imaging finding but 1 which can be seen in the setting of hepatic inflammation such is hepatitis.  3. No evidence of biliary obstruction.          Assessment/Plan:      * Sepsis  Tish Landeros is a 34 y.o. female who presents to the Emergency Department with sepsis and acute left pyelonephritis  This patient does have evidence of infective focus  My overall impression is sepsis with left acute pyelonephritis  Lab Results   Component Value Date    WBC 15.79 (H) 01/18/2020    HGB 11.9 (L) 01/18/2020    HCT 35.4 (L) 01/18/2020    MCV 95 01/18/2020     01/18/2020    Trend lactate level which is 2.5 now.  Aggressive IV fluid hydration initiated in the ER.  Continue IV Rocephin 1 g daily.  Follow microbiology results.  Vital signs post fluid administrations were-    Temp Readings from Last 1 Encounters:   01/18/20 99 °F (37.2 °C) (Oral)     BP Readings from Last 1 Encounters:   01/18/20 118/78     Pulse Readings from Last 1 Encounters:   01/18/20 98           Right upper quadrant abdominal pain  Right upper quadrant abdominal ultrasound results reviewed with the patient's suggesting acalculous cholecystitis.  Obtain HIDA scan and consult general surgeon.  Maintain NPO and continue supportive care with IV fluid hydration, antiemetics and intravenous narcotics as needed.  Continue proton pump inhibitor.      Pyelonephritis  Follow microbiology results.  Continue IV Rocephin 1 g daily.      Diabetes type 1, controlled  - start basal insulin @20 BID  - Stop drip at noon  - Continue mealtime dosing  - Ok for diet        VTE Risk Mitigation (From admission, onward)         Ordered     IP VTE LOW RISK PATIENT  Once      01/17/20 1841     Place CARRIE hose  Until discontinued      01/17/20 1841     Place sequential compression device  Until discontinued      01/17/20 1808                      Christa Lynn MD  Department of Hospital Medicine   [facility]

## 2020-01-22 VITALS
WEIGHT: 193.81 LBS | OXYGEN SATURATION: 96 % | SYSTOLIC BLOOD PRESSURE: 132 MMHG | DIASTOLIC BLOOD PRESSURE: 88 MMHG | HEART RATE: 68 BPM | RESPIRATION RATE: 18 BRPM | BODY MASS INDEX: 34.34 KG/M2 | TEMPERATURE: 98 F | HEIGHT: 63 IN

## 2020-01-22 LAB
ALBUMIN SERPL BCP-MCNC: 2.5 G/DL (ref 3.5–5.2)
ALP SERPL-CCNC: 125 U/L (ref 55–135)
ALT SERPL W/O P-5'-P-CCNC: 48 U/L (ref 10–44)
ANION GAP SERPL CALC-SCNC: 9 MMOL/L (ref 8–16)
ANION GAP SERPL CALC-SCNC: 9 MMOL/L (ref 8–16)
AST SERPL-CCNC: 109 U/L (ref 10–40)
BACTERIA BLD CULT: NORMAL
BACTERIA BLD CULT: NORMAL
BASOPHILS # BLD AUTO: 0.04 K/UL (ref 0–0.2)
BASOPHILS NFR BLD: 0.5 % (ref 0–1.9)
BILIRUB SERPL-MCNC: 0.3 MG/DL (ref 0.1–1)
BUN SERPL-MCNC: 3 MG/DL (ref 6–20)
BUN SERPL-MCNC: 3 MG/DL (ref 6–20)
CALCIUM SERPL-MCNC: 8.3 MG/DL (ref 8.7–10.5)
CALCIUM SERPL-MCNC: 8.3 MG/DL (ref 8.7–10.5)
CHLORIDE SERPL-SCNC: 104 MMOL/L (ref 95–110)
CHLORIDE SERPL-SCNC: 104 MMOL/L (ref 95–110)
CO2 SERPL-SCNC: 28 MMOL/L (ref 23–29)
CO2 SERPL-SCNC: 28 MMOL/L (ref 23–29)
CREAT SERPL-MCNC: 0.6 MG/DL (ref 0.5–1.4)
CREAT SERPL-MCNC: 0.6 MG/DL (ref 0.5–1.4)
DIFFERENTIAL METHOD: ABNORMAL
EOSINOPHIL # BLD AUTO: 0.1 K/UL (ref 0–0.5)
EOSINOPHIL NFR BLD: 1.6 % (ref 0–8)
ERYTHROCYTE [DISTWIDTH] IN BLOOD BY AUTOMATED COUNT: 12.4 % (ref 11.5–14.5)
EST. GFR  (AFRICAN AMERICAN): >60 ML/MIN/1.73 M^2
EST. GFR  (AFRICAN AMERICAN): >60 ML/MIN/1.73 M^2
EST. GFR  (NON AFRICAN AMERICAN): >60 ML/MIN/1.73 M^2
EST. GFR  (NON AFRICAN AMERICAN): >60 ML/MIN/1.73 M^2
GLUCOSE SERPL-MCNC: 42 MG/DL (ref 70–110)
GLUCOSE SERPL-MCNC: 42 MG/DL (ref 70–110)
HCT VFR BLD AUTO: 35.9 % (ref 37–48.5)
HGB BLD-MCNC: 11.9 G/DL (ref 12–16)
IMM GRANULOCYTES # BLD AUTO: 0.05 K/UL (ref 0–0.04)
LIPASE SERPL-CCNC: 5 U/L (ref 4–60)
LYMPHOCYTES # BLD AUTO: 1.7 K/UL (ref 1–4.8)
LYMPHOCYTES NFR BLD: 19.4 % (ref 18–48)
MCH RBC QN AUTO: 30.4 PG (ref 27–31)
MCHC RBC AUTO-ENTMCNC: 33.1 G/DL (ref 32–36)
MCV RBC AUTO: 92 FL (ref 82–98)
MONOCYTES # BLD AUTO: 1.1 K/UL (ref 0.3–1)
MONOCYTES NFR BLD: 12 % (ref 4–15)
NEUTROPHILS # BLD AUTO: 5.8 K/UL (ref 1.8–7.7)
NEUTROPHILS NFR BLD: 65.9 % (ref 38–73)
NRBC BLD-RTO: 0 /100 WBC
PLATELET # BLD AUTO: 226 K/UL (ref 150–350)
PMV BLD AUTO: 9.7 FL (ref 9.2–12.9)
POCT GLUCOSE: 101 MG/DL (ref 70–110)
POCT GLUCOSE: 156 MG/DL (ref 70–110)
POCT GLUCOSE: 41 MG/DL (ref 70–110)
POCT GLUCOSE: 57 MG/DL (ref 70–110)
POCT GLUCOSE: 63 MG/DL (ref 70–110)
POCT GLUCOSE: 75 MG/DL (ref 70–110)
POTASSIUM SERPL-SCNC: 2.7 MMOL/L (ref 3.5–5.1)
POTASSIUM SERPL-SCNC: 2.7 MMOL/L (ref 3.5–5.1)
PROT SERPL-MCNC: 5.8 G/DL (ref 6–8.4)
RBC # BLD AUTO: 3.92 M/UL (ref 4–5.4)
SODIUM SERPL-SCNC: 141 MMOL/L (ref 136–145)
SODIUM SERPL-SCNC: 141 MMOL/L (ref 136–145)
WBC # BLD AUTO: 8.75 K/UL (ref 3.9–12.7)

## 2020-01-22 PROCEDURE — 94761 N-INVAS EAR/PLS OXIMETRY MLT: CPT

## 2020-01-22 PROCEDURE — 63600175 PHARM REV CODE 636 W HCPCS: Performed by: INTERNAL MEDICINE

## 2020-01-22 PROCEDURE — 99233 PR SUBSEQUENT HOSPITAL CARE,LEVL III: ICD-10-PCS | Mod: ,,, | Performed by: INTERNAL MEDICINE

## 2020-01-22 PROCEDURE — 85025 COMPLETE CBC W/AUTO DIFF WBC: CPT

## 2020-01-22 PROCEDURE — 36415 COLL VENOUS BLD VENIPUNCTURE: CPT

## 2020-01-22 PROCEDURE — 83690 ASSAY OF LIPASE: CPT

## 2020-01-22 PROCEDURE — 99233 SBSQ HOSP IP/OBS HIGH 50: CPT | Mod: ,,, | Performed by: INTERNAL MEDICINE

## 2020-01-22 PROCEDURE — S5010 5% DEXTROSE AND 0.45% SALINE: HCPCS | Performed by: INTERNAL MEDICINE

## 2020-01-22 PROCEDURE — C9113 INJ PANTOPRAZOLE SODIUM, VIA: HCPCS | Performed by: INTERNAL MEDICINE

## 2020-01-22 PROCEDURE — 80053 COMPREHEN METABOLIC PANEL: CPT

## 2020-01-22 PROCEDURE — 25000003 PHARM REV CODE 250: Performed by: INTERNAL MEDICINE

## 2020-01-22 RX ORDER — POTASSIUM CHLORIDE 20 MEQ/1
20 TABLET, EXTENDED RELEASE ORAL ONCE
Status: DISCONTINUED | OUTPATIENT
Start: 2020-01-22 | End: 2020-01-22 | Stop reason: HOSPADM

## 2020-01-22 RX ORDER — DEXTROSE MONOHYDRATE AND SODIUM CHLORIDE 5; .45 G/100ML; G/100ML
INJECTION, SOLUTION INTRAVENOUS CONTINUOUS
Status: DISCONTINUED | OUTPATIENT
Start: 2020-01-22 | End: 2020-01-22 | Stop reason: HOSPADM

## 2020-01-22 RX ORDER — SODIUM,POTASSIUM PHOSPHATES 280-250MG
2 POWDER IN PACKET (EA) ORAL
Status: DISCONTINUED | OUTPATIENT
Start: 2020-01-22 | End: 2020-01-22 | Stop reason: HOSPADM

## 2020-01-22 RX ORDER — POTASSIUM CHLORIDE 20 MEQ/15ML
60 SOLUTION ORAL
Status: DISCONTINUED | OUTPATIENT
Start: 2020-01-22 | End: 2020-01-22 | Stop reason: HOSPADM

## 2020-01-22 RX ORDER — LANOLIN ALCOHOL/MO/W.PET/CERES
800 CREAM (GRAM) TOPICAL
Status: DISCONTINUED | OUTPATIENT
Start: 2020-01-22 | End: 2020-01-22 | Stop reason: HOSPADM

## 2020-01-22 RX ORDER — IBUPROFEN 200 MG
1 TABLET ORAL DAILY
Status: DISCONTINUED | OUTPATIENT
Start: 2020-01-23 | End: 2020-01-22 | Stop reason: HOSPADM

## 2020-01-22 RX ORDER — SODIUM CHLORIDE AND POTASSIUM CHLORIDE 150; 450 MG/100ML; MG/100ML
INJECTION, SOLUTION INTRAVENOUS CONTINUOUS
Status: DISCONTINUED | OUTPATIENT
Start: 2020-01-22 | End: 2020-01-22

## 2020-01-22 RX ORDER — POTASSIUM CHLORIDE 20 MEQ/15ML
40 SOLUTION ORAL
Status: DISCONTINUED | OUTPATIENT
Start: 2020-01-22 | End: 2020-01-22 | Stop reason: HOSPADM

## 2020-01-22 RX ORDER — POTASSIUM CHLORIDE 20 MEQ/1
40 TABLET, EXTENDED RELEASE ORAL ONCE
Status: DISCONTINUED | OUTPATIENT
Start: 2020-01-22 | End: 2020-01-22 | Stop reason: HOSPADM

## 2020-01-22 RX ORDER — POTASSIUM CHLORIDE 7.45 MG/ML
10 INJECTION INTRAVENOUS
Status: COMPLETED | OUTPATIENT
Start: 2020-01-22 | End: 2020-01-22

## 2020-01-22 RX ORDER — POTASSIUM CHLORIDE 750 MG/1
10 TABLET, EXTENDED RELEASE ORAL ONCE
Status: DISCONTINUED | OUTPATIENT
Start: 2020-01-22 | End: 2020-01-22

## 2020-01-22 RX ADMIN — POTASSIUM CHLORIDE 10 MEQ: 7.46 INJECTION, SOLUTION INTRAVENOUS at 12:01

## 2020-01-22 RX ADMIN — SODIUM CHLORIDE AND POTASSIUM CHLORIDE: 4.5; 1.49 INJECTION, SOLUTION INTRAVENOUS at 09:01

## 2020-01-22 RX ADMIN — ONDANSETRON 4 MG: 2 INJECTION INTRAMUSCULAR; INTRAVENOUS at 12:01

## 2020-01-22 RX ADMIN — DEXTROSE AND SODIUM CHLORIDE: 5; .45 INJECTION, SOLUTION INTRAVENOUS at 12:01

## 2020-01-22 RX ADMIN — ONDANSETRON 4 MG: 2 INJECTION INTRAMUSCULAR; INTRAVENOUS at 06:01

## 2020-01-22 RX ADMIN — POTASSIUM CHLORIDE 10 MEQ: 7.46 INJECTION, SOLUTION INTRAVENOUS at 08:01

## 2020-01-22 RX ADMIN — PANTOPRAZOLE SODIUM 40 MG: 40 INJECTION, POWDER, LYOPHILIZED, FOR SOLUTION INTRAVENOUS at 08:01

## 2020-01-22 RX ADMIN — POTASSIUM CHLORIDE 10 MEQ: 7.46 INJECTION, SOLUTION INTRAVENOUS at 10:01

## 2020-01-22 RX ADMIN — DEXTROSE MONOHYDRATE 12.5 G: 25 INJECTION, SOLUTION INTRAVENOUS at 08:01

## 2020-01-22 RX ADMIN — METOCLOPRAMIDE 10 MG: 5 INJECTION, SOLUTION INTRAMUSCULAR; INTRAVENOUS at 05:01

## 2020-01-22 RX ADMIN — METOCLOPRAMIDE 10 MG: 5 INJECTION, SOLUTION INTRAMUSCULAR; INTRAVENOUS at 03:01

## 2020-01-22 NOTE — PROGRESS NOTES
[facility] Hospital Medicine  Progress Note    Patient Name: Tish Landeros  MRN: [id number]  Patient Class: IP- Inpatient   Admission Date: 1/17/2020  Length of Stay: 4 days  Attending Physician: Christa Lynn MD  Primary Care Provider: Primary Doctor No        Subjective:     Principal Problem:Sepsis        HPI:  Patient is a 34-year-old type 1 diabetic who is being admitted to Hospital Medicine from [facility] Emergency Room under observation status with complaint of epigastric pain associated with nausea and vomiting for 1 day.  Patient reports associated fever with diaphoresis, dizziness, headache and left flank discomfort.  Patient denies any hematuria or pyuria.  Patient denies any history of nephrolithiasis.  Patient reports higher than usual blood sugars lately.  Patient is prior history of diabetic ketoacidosis.  No urinary complaints reported.  Patient denies any hematemesis or melena.  No sick contact or recent travel history reported.        Overview/Hospital Course:  No notes on file    Interval History:  Afebrile.  Patient is looking and feeling much better.  Patient reports improving epigastric/right upper quadrant abdominal pain. Nausea is intermittent, no further emesis reported.  Patient is still waiting for general surgeon to evaluate her. Urine cultures grew E coli species which are pansensitive.  Presently on intravenous ceftriaxone antibiotic therapy.  Hemoglobin A1c 10.2.      Review of Systems   Constitutional: Positive for appetite change and diaphoresis.   Gastrointestinal: Positive for abdominal pain, nausea and vomiting.   Genitourinary: Positive for flank pain.   All other systems reviewed and are negative.    Objective:     Vital Signs (Most Recent):  Temp: 99.8 °F (37.7 °C) (01/22/20 0725)  Pulse: 76 (01/22/20 0824)  Resp: 18 (01/22/20 0824)  BP: 120/80 (01/22/20 0725)  SpO2: 98 % (01/22/20 0824) Vital Signs (24h Range):  Temp:  [98.1 °F  (36.7 °C)-99.8 °F (37.7 °C)] 99.8 °F (37.7 °C)  Pulse:  [74-83] 76  Resp:  [16-18] 18  SpO2:  [96 %-98 %] 98 %  BP: (112-134)/(74-86) 120/80     Weight: 87.9 kg (193 lb 12.5 oz)  Body mass index is 34.33 kg/m².    Intake/Output Summary (Last 24 hours) at 1/22/2020 0903  Last data filed at 1/22/2020 0600  Gross per 24 hour   Intake 2540 ml   Output --   Net 2540 ml      Physical Exam   Constitutional: She is oriented to person, place, and time. She appears well-developed.   HENT:   Head: Normocephalic and atraumatic.   Dry mucous membranes   Eyes: Pupils are equal, round, and reactive to light. Conjunctivae are normal.   Neck: Neck supple. No JVD present. No thyromegaly present.   Cardiovascular: Normal rate and regular rhythm. Exam reveals no gallop and no friction rub.   No murmur heard.  Pulmonary/Chest: Effort normal and breath sounds normal.   Abdominal: Soft. Bowel sounds are normal. She exhibits no distension and no mass. There is tenderness.   Right upper quadrant/epigastric tenderness present without any peritoneal signs or guarding.   Musculoskeletal: Normal range of motion. She exhibits no edema.   Neurological: She is oriented to person, place, and time. No cranial nerve deficit.   Skin: Skin is warm and dry.   Psychiatric: Her behavior is normal.       Significant Labs:   CBC:   Recent Labs   Lab 01/21/20  0504 01/22/20  0450   WBC 6.49 8.75   HGB 11.1* 11.9*   HCT 32.8* 35.9*    226     CMP:   Recent Labs   Lab 01/21/20  0504 01/22/20  0450     139 141  141   K 3.6  3.6 2.7*  2.7*     105 104  104   CO2 27  27 28  28   *  206* 42*  42*   BUN 6  6 3*  3*   CREATININE 0.8  0.8 0.6  0.6   CALCIUM 7.7*  7.7* 8.3*  8.3*   PROT 5.0* 5.8*   ALBUMIN 2.1* 2.5*   BILITOT 0.2 0.3   ALKPHOS 101 125   AST 20 109*   ALT 32 48*   ANIONGAP 7*  7* 9  9   EGFRNONAA >60  >60 >60  >60     Microbiology Results (last 7 days)     Procedure Component Value Units Date/Time    Blood  culture x two cultures. Draw prior to antibiotics. [[id number]] Collected:  01/17/20 1604    Order Status:  Completed Specimen:  Blood from Peripheral, Right  Hand Updated:  01/21/20 2312     Blood Culture, Routine No Growth to date      No Growth to date      No Growth to date      No Growth to date      No Growth to date    Narrative:       Aerobic and anaerobic    Blood culture x two cultures. Draw prior to antibiotics. [[id number]] Collected:  01/17/20 1604    Order Status:  Completed Specimen:  Blood from Peripheral, Left  Arm Updated:  01/21/20 2312     Blood Culture, Routine No Growth to date      No Growth to date      No Growth to date      No Growth to date      No Growth to date    Narrative:       Aerobic and anaerobic    Urine culture [[id number]]  (Abnormal)  (Susceptibility) Collected:  01/17/20 1232    Order Status:  Completed Specimen:  Urine Updated:  01/20/20 0052     Urine Culture, Routine ESCHERICHIA COLI  > 100,000 cfu/ml  No other significant isolate      Narrative:       Preferred Collection Type->Urine, Clean Catch    Influenza A & B by Molecular [[id number]] Collected:  01/17/20 1225    Order Status:  Completed Specimen:  Nasopharyngeal Swab Updated:  01/17/20 1403     Influenza A, Molecular Negative     Influenza B, Molecular Negative     Flu A & B Source Nasal swab        Significant Imaging:  CT abdomen and pelvis with contrast:  Abnormal appearance of the left kidney compatible with pyelonephritis.  No hydronephrosis.    US abdomen:  1. Diffuse gallbladder wall thickening and gallbladder sludge.  No sonographic Duckworth sign or shadowing gallstones.  Diffuse bladder wall gallbladder wall thickening can be a nonspecific finding and can be seen in the setting of intrinsic liver disease.  Acalculous cholecystitis would be difficult to entirely exclude but would warrant an appropriate clinical suspicion.  A HIDA scan could be considered for additional evaluation as deemed clinically  appropriate..  2. Suggestion of mildly echogenic periportal triads, a nonspecific imaging finding but 1 which can be seen in the setting of hepatic inflammation such is hepatitis.  3. No evidence of biliary obstruction.    HIDA scan:  The cystic and common bile ducts are patent.  The gallbladder ejection fraction is 80% at 40 minutes.  This is within normal limits.      Assessment/Plan:      * Sepsis  Tish Landeros is a 34 y.o. female who presents to the Emergency Department with sepsis and acute left pyelonephritis  This patient does have evidence of infective focus  My overall impression is sepsis with left acute pyelonephritis  Lab Results   Component Value Date    WBC 15.79 (H) 01/18/2020    HGB 11.9 (L) 01/18/2020    HCT 35.4 (L) 01/18/2020    MCV 95 01/18/2020     01/18/2020    Trend lactate level which is 2.5 now.  Aggressive IV fluid hydration initiated in the ER.  Continue IV Rocephin 1 g daily.  Follow microbiology results.  Vital signs post fluid administrations were-    Temp Readings from Last 1 Encounters:   01/18/20 99 °F (37.2 °C) (Oral)     BP Readings from Last 1 Encounters:   01/18/20 118/78     Pulse Readings from Last 1 Encounters:   01/18/20 98     Right upper quadrant abdominal pain  Right upper quadrant abdominal ultrasound results reviewed with the patient's suggesting acalculous cholecystitis.  HIDA scan results reviewed with the patient.  and continue supportive care with IV fluid hydration, antiemetics and intravenous narcotics as needed.  Continue proton pump inhibitor.  Consulting gastroenterologist for possible need for EGD.      Pyelonephritis  Follow microbiology results.  Continue IV Rocephin 1 g daily.      Diabetes type 1, controlled  Hypoglycemia  Patient has been noted to have hypoglycemic readings due to being NPO and persistent nausea and vomiting.  Will change IV fluids to D5/0.45% normal saline at 75 cc/hour.  Continue Accu-Chek monitoring q.2 hours.  Discussed  with patient's registered nurse.    VTE Risk Mitigation (From admission, onward)         Ordered     IP VTE LOW RISK PATIENT  Once.  Patient is ambulatory.      01/17/20 1841     Place CARRIE hose  Until discontinued      01/17/20 1841     Place sequential compression device  Until discontinued      01/17/20 1808                      Christa Lynn MD  Department of Hospital Medicine   [facility]

## 2020-01-22 NOTE — CONSULTS
[facility]     CC: Abdominal pain, nausea, vomiting, diarrhea    HPI 34 y.o. female with uncontrolled DM I who is admitted with acute pyelonephritis associated with moderate to severe, epigastric, persistent abdominal pain associated with continued nausea and vomiting as well as new watery diarrhea. She notes prior to admission she had none of these symptoms. She also complains of abdominal bloating and feeling of fullness.    Past Medical History:   Diagnosis Date    Abnormal Pap smear of cervix     Diabetes in pregnancy 5/3/2019    Diabetes mellitus     diagnosed 6years ago.     Diabetes mellitus complicating pregnancy 2019    HPV in female     Pre-existing type 1 diabetes mellitus during pregnancy in third trimester 2019    Type 1 diabetes mellitus complicating pregnancy, antepartum 2019       Past Surgical History:   Procedure Laterality Date    CERVICAL BIOPSY  W/ LOOP ELECTRODE EXCISION         Social History     Tobacco Use    Smoking status: Current Every Day Smoker     Packs/day: 0.50     Years: 10.00     Pack years: 5.00    Smokeless tobacco: Never Used   Substance Use Topics    Alcohol use: Yes     Frequency: Never    Drug use: No       Family History   Problem Relation Age of Onset    Diabetes Father         type 1    Hypertension Mother     Heart disease Mother         stent    Congenital heart disease Paternal Aunt          of hole in heart    Diabetes Sister         Juvenile DM    Arrhythmia Neg Hx     Cardiomyopathy Neg Hx     Pacemaker/defibrilator Neg Hx     Heart attacks under age 50 Neg Hx     Breast cancer Neg Hx     Ovarian cancer Neg Hx        Review of Systems  General ROS: negative for - chills, fever or weight loss  Psychological ROS: negative for - hallucination, depression or suicidal ideation  Ophthalmic ROS: negative for - blurry vision, photophobia or eye pain  ENT ROS: negative for - epistaxis, sore throat or  "rhinorrhea  Respiratory ROS: no cough, shortness of breath, or wheezing  Cardiovascular ROS: no chest pain or dyspnea on exertion  Gastrointestinal ROS: + nausea, + vomiting, + abdominal pain, + watery stools  Genito-Urinary ROS: no dysuria, trouble voiding, or hematuria  Musculoskeletal ROS: negative for - arthralgia, myalgia, weakness  Neurological ROS: no syncope or seizures; no ataxia  Dermatological ROS: negative for pruritis, rash and jaundice    Physical Examination  /79 (Patient Position: Lying)   Pulse 60   Temp 99.1 °F (37.3 °C) (Oral)   Resp 18   Ht 5' 3" (1.6 m)   Wt 87.9 kg (193 lb 12.5 oz)   LMP 12/05/2019   SpO2 96%   Breastfeeding? No   BMI 34.33 kg/m²   General appearance: alert, cooperative, no distress  HENT: Normocephalic, atraumatic, neck symmetrical, no nasal discharge   Eyes: conjunctivae/corneas clear, PERRL, EOM's intact, sclera anicteric  Lungs: clear to auscultation bilaterally, no dullness to percussion bilaterally, symmetric expansion, breathing unlabored  Heart: regular rate and rhythm without rub; no displacement of the PMI   Abdomen: soft, mild distention, ttp diffusely most significant in epigastrium with voluntary guarding without rebound , BS hypoactive, no masses appreciated   Extremities: extremities symmetric; no clubbing, cyanosis, or edema  Integument: Skin color, texture, turgor normal; no rashes; hair distrubution normal, no jaundice  Neurologic: Alert and oriented X 3, no focal sensory or motor neurologic deficits  Psychiatric: no pressured speech; normal affect; no evidence of impaired cognition, no anxiety/depression     Labs:  Lab Results   Component Value Date    WBC 8.75 01/22/2020    HGB 11.9 (L) 01/22/2020    HCT 35.9 (L) 01/22/2020    MCV 92 01/22/2020     01/22/2020       CMP  Sodium   Date Value Ref Range Status   01/22/2020 141 136 - 145 mmol/L Final   01/22/2020 141 136 - 145 mmol/L Final     Potassium   Date Value Ref Range Status "   01/22/2020 2.7 (LL) 3.5 - 5.1 mmol/L Final     Comment:     potassium, glucose   critical result(s) called and verbal readback   obtained from sophie jaramillo by [email or web address] 01/22/2020 06:14     01/22/2020 2.7 (LL) 3.5 - 5.1 mmol/L Final     Chloride   Date Value Ref Range Status   01/22/2020 104 95 - 110 mmol/L Final   01/22/2020 104 95 - 110 mmol/L Final     CO2   Date Value Ref Range Status   01/22/2020 28 23 - 29 mmol/L Final   01/22/2020 28 23 - 29 mmol/L Final     Glucose   Date Value Ref Range Status   01/22/2020 42 (LL) 70 - 110 mg/dL Final     Comment:     potassium, glucose   critical result(s) called and verbal readback   obtained from sophie jaramillo by CPW1 01/22/2020 06:14     01/22/2020 42 (LL) 70 - 110 mg/dL Final     BUN, Bld   Date Value Ref Range Status   01/22/2020 3 (L) 6 - 20 mg/dL Final   01/22/2020 3 (L) 6 - 20 mg/dL Final     Creatinine   Date Value Ref Range Status   01/22/2020 0.6 0.5 - 1.4 mg/dL Final   01/22/2020 0.6 0.5 - 1.4 mg/dL Final     Calcium   Date Value Ref Range Status   01/22/2020 8.3 (L) 8.7 - 10.5 mg/dL Final   01/22/2020 8.3 (L) 8.7 - 10.5 mg/dL Final     Total Protein   Date Value Ref Range Status   01/22/2020 5.8 (L) 6.0 - 8.4 g/dL Final     Albumin   Date Value Ref Range Status   01/22/2020 2.5 (L) 3.5 - 5.2 g/dL Final     Total Bilirubin   Date Value Ref Range Status   01/22/2020 0.3 0.1 - 1.0 mg/dL Final     Comment:     For infants and newborns, interpretation of results should be based  on gestational age, weight and in agreement with clinical  observations.  Premature Infant recommended reference ranges:  Up to 24 hours.............<8.0 mg/dL  Up to 48 hours............<12.0 mg/dL  3-5 days..................<15.0 mg/dL  6-29 days.................<15.0 mg/dL       Alkaline Phosphatase   Date Value Ref Range Status   01/22/2020 125 55 - 135 U/L Final     AST   Date Value Ref Range Status   01/22/2020 109 (H) 10 - 40 U/L Final     ALT   Date Value Ref Range Status   01/22/2020 48  (H) 10 - 44 U/L Final     Anion Gap   Date Value Ref Range Status   01/22/2020 9 8 - 16 mmol/L Final   01/22/2020 9 8 - 16 mmol/L Final     eGFR if    Date Value Ref Range Status   01/22/2020 >60 >60 mL/min/1.73 m^2 Final   01/22/2020 >60 >60 mL/min/1.73 m^2 Final     eGFR if non    Date Value Ref Range Status   01/22/2020 >60 >60 mL/min/1.73 m^2 Final     Comment:     Calculation used to obtain the estimated glomerular filtration  rate (eGFR) is the CKD-EPI equation.      01/22/2020 >60 >60 mL/min/1.73 m^2 Final     Comment:     Calculation used to obtain the estimated glomerular filtration  rate (eGFR) is the CKD-EPI equation.        -acute hepatitis panel- NR  -Blood culture- NGTD  -Flu- negative  =Urine culture- pan-sensitive E.coli > 100K    Imaging:  HIDA scan was independently visualized and reviewed by me and showed patent cystic and CBD    Abdominal ultrasound- diffuse thickening of gallbladder wall and gallbladder sludge, CBD 2 mm    Assessment:   34 y.o. female with uncontrolled DM I who is admitted with acute pyelonephritis associated with persistent nausea, vomiting and abdominal pain, now with watery diarrhea, unclear if related to transient gastroparesis related to underlying slowly resolving pyelonephritis. She has had transient elevation in LFTs which are likely reactive (normal HIDA scan, negative acute hepatitis panel).   Plan:  -KUB  -Repeat Lipase  -Supportive care with anti-emetics, pain medications and IVFs  -Low residue diet  -PPI daily  -Check C.diff and stool culture  -From GI perspective if able to remain hydrated and symptoms somewhat controlled with oral medications ok to discharge home with outpatient GI follow up  -If remains inpatient will make NPO at midnight and re-evaluate in AM with possible EGD    Nikole Flowers MD  [facility]  [address], [address]  [place] [address]  Office: [phone]  Fax: [phone]

## 2020-01-22 NOTE — CARE UPDATE
01/22/20 0824   Patient Assessment/Suction   Level of Consciousness (AVPU) alert   PRE-TX-O2   O2 Device (Oxygen Therapy) room air   SpO2 98 %   Pulse Oximetry Type Intermittent   $ Pulse Oximetry - Multiple Charge Pulse Oximetry - Multiple   Pulse 76   Resp 18

## 2020-01-22 NOTE — PLAN OF CARE
Patient AAOx4. VSS, afebrile. Tele # [id number] monitored. BG monitored - insulin administered as ordered. No reports of nausea/vomiting this shift. Tolerating liquids okay. Ambulatory to BR. Hourly/q2 rounds done to promote safety. No complaints noted at this time. Patient resting quietly throughout majority of shift. Bed in low, call light within reach. Continuing to monitor.

## 2020-01-22 NOTE — NURSING
Pt requesting to go downstairs to smoke. I informed patient that she could not leave the floor. Pt got very upset and stated that she wanted her iv out now so she can leave. I educated patient that she needed to be connected to her fluids to keep her blood sugars up and that she still needs antibiotics. Pt states she can not take staying In her room anymore and that no one can tell her where to stay. I educated patient of all the risks of leaving against medical advice including blood sugar dropping dangerously low, sepsis and death. IV removed and tele removed. Pt left with all belongings with mother. Updated Dr. Lynn.

## 2020-01-22 NOTE — SUBJECTIVE & OBJECTIVE
Interval History:  T-max 100.6 F. Patient's blood sugars continued to be labile suggesting brittle diabetes.  Urine cultures grew E coli species which are pansensitive.  Presently on intravenous ceftriaxone antibiotic therapy.  Hemoglobin A1c 10.2.  Patient appears frustrated and continues to complain of persistent nausea and occasional vomiting with right upper quadrant abdominal pain.    Review of Systems   Constitutional: Positive for appetite change and diaphoresis.   Gastrointestinal: Positive for abdominal pain, nausea and vomiting.   Genitourinary: Positive for flank pain.   All other systems reviewed and are negative.    Objective:     Vital Signs (Most Recent):  Temp: 99.8 °F (37.7 °C) (01/22/20 0725)  Pulse: 76 (01/22/20 0824)  Resp: 18 (01/22/20 0824)  BP: 120/80 (01/22/20 0725)  SpO2: 98 % (01/22/20 0824) Vital Signs (24h Range):  Temp:  [98.1 °F (36.7 °C)-99.8 °F (37.7 °C)] 99.8 °F (37.7 °C)  Pulse:  [74-83] 76  Resp:  [16-18] 18  SpO2:  [96 %-98 %] 98 %  BP: (112-134)/(74-86) 120/80     Weight: 87.9 kg (193 lb 12.5 oz)  Body mass index is 34.33 kg/m².    Intake/Output Summary (Last 24 hours) at 1/22/2020 0903  Last data filed at 1/22/2020 0600  Gross per 24 hour   Intake 2540 ml   Output --   Net 2540 ml      Physical Exam   Constitutional: She is oriented to person, place, and time. She appears well-developed.   HENT:   Head: Normocephalic and atraumatic.   Dry mucous membranes   Eyes: Pupils are equal, round, and reactive to light. Conjunctivae are normal.   Neck: Neck supple. No JVD present. No thyromegaly present.   Cardiovascular: Normal rate and regular rhythm. Exam reveals no gallop and no friction rub.   No murmur heard.  Pulmonary/Chest: Effort normal and breath sounds normal.   Abdominal: Soft. Bowel sounds are normal. She exhibits no distension and no mass. There is tenderness.   Right upper quadrant/epigastric tenderness present without any peritoneal signs or guarding.   Musculoskeletal:  Normal range of motion. She exhibits no edema.   Neurological: She is oriented to person, place, and time. No cranial nerve deficit.   Skin: Skin is warm and dry.   Psychiatric: Her behavior is normal.       Significant Labs:   CBC:   Recent Labs   Lab 01/21/20  0504 01/22/20  0450   WBC 6.49 8.75   HGB 11.1* 11.9*   HCT 32.8* 35.9*    226     CMP:   Recent Labs   Lab 01/21/20  0504 01/22/20  0450     139 141  141   K 3.6  3.6 2.7*  2.7*     105 104  104   CO2 27  27 28  28   *  206* 42*  42*   BUN 6  6 3*  3*   CREATININE 0.8  0.8 0.6  0.6   CALCIUM 7.7*  7.7* 8.3*  8.3*   PROT 5.0* 5.8*   ALBUMIN 2.1* 2.5*   BILITOT 0.2 0.3   ALKPHOS 101 125   AST 20 109*   ALT 32 48*   ANIONGAP 7*  7* 9  9   EGFRNONAA >60  >60 >60  >60     Microbiology Results (last 7 days)     Procedure Component Value Units Date/Time    Blood culture x two cultures. Draw prior to antibiotics. [[id number]] Collected:  01/17/20 1604    Order Status:  Completed Specimen:  Blood from Peripheral, Right  Hand Updated:  01/21/20 2312     Blood Culture, Routine No Growth to date      No Growth to date      No Growth to date      No Growth to date      No Growth to date    Narrative:       Aerobic and anaerobic    Blood culture x two cultures. Draw prior to antibiotics. [[id number]] Collected:  01/17/20 1604    Order Status:  Completed Specimen:  Blood from Peripheral, Left  Arm Updated:  01/21/20 2312     Blood Culture, Routine No Growth to date      No Growth to date      No Growth to date      No Growth to date      No Growth to date    Narrative:       Aerobic and anaerobic    Urine culture [[id number]]  (Abnormal)  (Susceptibility) Collected:  01/17/20 1232    Order Status:  Completed Specimen:  Urine Updated:  01/20/20 0052     Urine Culture, Routine ESCHERICHIA COLI  > 100,000 cfu/ml  No other significant isolate      Narrative:       Preferred Collection Type->Urine, Clean Catch    Influenza A & B by  Molecular [[id number]] Collected:  01/17/20 1225    Order Status:  Completed Specimen:  Nasopharyngeal Swab Updated:  01/17/20 1403     Influenza A, Molecular Negative     Influenza B, Molecular Negative     Flu A & B Source Nasal swab        Significant Imaging:  CT abdomen and pelvis with contrast:  Abnormal appearance of the left kidney compatible with pyelonephritis.  No hydronephrosis.    US abdomen:  1. Diffuse gallbladder wall thickening and gallbladder sludge.  No sonographic Duckworth sign or shadowing gallstones.  Diffuse bladder wall gallbladder wall thickening can be a nonspecific finding and can be seen in the setting of intrinsic liver disease.  Acalculous cholecystitis would be difficult to entirely exclude but would warrant an appropriate clinical suspicion.  A HIDA scan could be considered for additional evaluation as deemed clinically appropriate..  2. Suggestion of mildly echogenic periportal triads, a nonspecific imaging finding but 1 which can be seen in the setting of hepatic inflammation such is hepatitis.  3. No evidence of biliary obstruction.    HIDA scan:  The cystic and common bile ducts are patent.  The gallbladder ejection fraction is 80% at 40 minutes.  This is within normal limits.

## 2020-01-22 NOTE — NURSING
BG critical at 41. Rechecked immediately after giving orange juice and was 75. Message sent to hospitalist, Betina Alfaro NP to advise. Pt does not want glucose tabs.

## 2020-01-22 NOTE — PHYSICIAN QUERY
PT Name: Tish Landeros  MR #: [id number]    Physician Query Form - Cause and Effect Relationship Clarification      CDS/: Priscilla Julio RN CDI   Contact information: [email or web address]    This form is a permanent document in the medical record.     Query Date: January 22, 2020    By submitting this query, we are merely seeking further clarification of documentation. Please utilize your independent clinical judgment when addressing the question(s) below.    The Medical record contains the following:  Supporting Clinical Findings   Location in record    sepsis with left acute pyelonephritis                                                                                                                                             [facility] medicine 1/22 211 pm  DANELLE Lynn MD    Urine culture 1/17    ESCHERICHIA COLI   > 100,000 cfu/ml                                                                                                          Lab - microbiology         Provider, please clarify if there is any correlation between  Sepsis due to pyelonephritis  and  E Coli.           Are the conditions:      [ x ] Due to or associated with each other   [  ] Unrelated to each other   [  ] Other (Please Specify): _________________________   [  ] Clinically Undetermined

## 2020-01-22 NOTE — CONSULTS
Consult received by me today at 1015 Am.  This is first I have heard of this consult  Will plan on seeing this afternoon.   HIDA scan suggests no findings consistent with acute cholecystitis.

## 2020-01-22 NOTE — DISCHARGE SUMMARY
[facility] Hospital Medicine  Discharge Summary      Patient Name: Tish Landeros  MRN: [id number]  Admission Date: 1/17/2020  Hospital Length of Stay: 4 days  Discharge Date and Time:  01/22/2020 5:19 PM  Attending Physician: Faye att. providers found   Discharging Provider: Christa Lynn MD  Primary Care Provider: Primary Doctor Faye      HPI:   Patient is a 34-year-old type 1 diabetic who is being admitted to Hospital Medicine from [facility] Emergency Room under observation status with complaint of epigastric pain associated with nausea and vomiting for 1 day.  Patient reports associated fever with diaphoresis, dizziness, headache and left flank discomfort.  Patient denies any hematuria or pyuria.  Patient denies any history of nephrolithiasis.  Patient reports higher than usual blood sugars lately.  Patient is prior history of diabetic ketoacidosis.  No urinary complaints reported.  Patient denies any hematemesis or melena.  No sick contact or recent travel history reported.      Hospital Course:   Patient was admitted to medicine telemetry service for management of sepsis related to acute pyelonephritis.  Patient was treated with intravenous antibiotics.  Blood cultures remain negative while urine culture grew E coli which was pansensitive.  During hospital stay patient noted to have a very labile brittle diabetes glycemic control.  Patient had started complaining epigastric and right upper quadrant abdominal pain associated with persisting nausea and intermittent vomiting.  Patient was concerned for gallbladder disease. Patient and her went right upper quadrant ultrasound followed by HIDA scan.  General surgery team was being consulted.  Also consultation with Dr. Flowers from GI was being obtained for concerns for possible peptic ulcer disease.  Patient was extensively counseled regarding smoking cessation.  Patient decided to leave against medical advice prior to  further workup as recommended by GI specialist and did not want to wait for general surgeon evaluation.  Patient's sign paperwork for leaving against medical advice.  Patient is aware of dangers of leaving against medical advice including worsening her symptoms, sepsis, septic shock, worsening blood glucose, diabetic ketoacidosis, hypoglycemia and death.    Consults:   Consults (From admission, onward)        Status Ordering Provider     Inpatient consult to Gastroenterology  Once     Provider:  Nikole Flowers MD    Completed VILLA ELIZALDE     Inpatient consult to General Surgery  Once     Provider:  (Not yet assigned)    Completed VILLA ELIZALDE        Final Active Diagnoses:    Diagnosis Date Noted POA    PRINCIPAL PROBLEM:  Sepsis [A41.9] 01/17/2020 Yes    Right upper quadrant abdominal pain [R10.11] 01/20/2020 No    Pyelonephritis [N12] 01/17/2020 Yes    Diabetes type 1, controlled [E10.9] 05/24/2019 Yes      Problems Resolved During this Admission:       Discharged Condition:  Satisfactory    Disposition: Left Against Medical Adv*    Follow Up:    Patient Instructions:   Patient left against medical advice    Significant Diagnostic Studies: Labs:   CMP   Recent Labs   Lab 01/21/20  0504 01/22/20  0450     139 141  141   K 3.6  3.6 2.7*  2.7*     105 104  104   CO2 27  27 28  28   *  206* 42*  42*   BUN 6  6 3*  3*   CREATININE 0.8  0.8 0.6  0.6   CALCIUM 7.7*  7.7* 8.3*  8.3*   PROT 5.0* 5.8*   ALBUMIN 2.1* 2.5*   BILITOT 0.2 0.3   ALKPHOS 101 125   AST 20 109*   ALT 32 48*   ANIONGAP 7*  7* 9  9   ESTGFRAFRICA >60  >60 >60  >60   EGFRNONAA >60  >60 >60  >60    and CBC   Recent Labs   Lab 01/21/20  0504 01/22/20  0450   WBC 6.49 8.75   HGB 11.1* 11.9*   HCT 32.8* 35.9*    226     CT abdomen and pelvis with contrast:  Abnormal appearance of the left kidney compatible with pyelonephritis.  No hydronephrosis.     US abdomen:  1. Diffuse gallbladder wall  thickening and gallbladder sludge.  No sonographic Duckworth sign or shadowing gallstones.  Diffuse bladder wall gallbladder wall thickening can be a nonspecific finding and can be seen in the setting of intrinsic liver disease.  Acalculous cholecystitis would be difficult to entirely exclude but would warrant an appropriate clinical suspicion.  A HIDA scan could be considered for additional evaluation as deemed clinically appropriate..  2. Suggestion of mildly echogenic periportal triads, a nonspecific imaging finding but 1 which can be seen in the setting of hepatic inflammation such is hepatitis.  3. No evidence of biliary obstruction.     HIDA scan:  The cystic and common bile ducts are patent.  The gallbladder ejection fraction is 80% at 40 minutes.  This is within normal limits.    Medications:  Reconciled Home Medications:      Medication List      ASK your doctor about these medications    ibuprofen 600 MG tablet  Commonly known as:  ADVIL,MOTRIN  Take 1 tablet (600 mg total) by mouth every 6 (six) hours as needed for Pain.     insulin aspart U-100 100 unit/mL injection  Commonly known as:  NovoLOG U-100 Insulin aspart  10 units prn elevated glucose  Maximun 40 units daily     insulin glargine 100 units/mL (3mL) SubQ pen  Commonly known as:  Basaglar KwikPen U-100 Insulin  Inject 18 Units into the skin 2 (two) times daily.            Indwelling Lines/Drains at time of discharge:   Lines/Drains/Airways     None               Time spent on the discharge of patient: 20 minutes  Patient was seen and examined on the date of discharge and determined to be suitable for discharge.    Christa Lynn MD  Department of Hospital Medicine  [facility]

## 2020-01-22 NOTE — NURSING
Attempted to call Dr. Garcia for consult.. Office states he is not on call on 4.   Called Dr. Moncada and left voicemail regarding consult/ sent secure chat. \

## 2020-01-23 NOTE — PLAN OF CARE
01/23/20 1119   Final Note   Assessment Type Final Discharge Note   Anticipated Discharge Disposition Left Against

## 2020-01-24 RX ORDER — INSULIN ASPART 100 [IU]/ML
INJECTION, SOLUTION INTRAVENOUS; SUBCUTANEOUS
Qty: 15 ML | Refills: 0 | OUTPATIENT
Start: 2020-01-24

## 2020-03-14 ENCOUNTER — HOSPITAL ENCOUNTER (OUTPATIENT)
Facility: HOSPITAL | Age: 35
Discharge: HOME | End: 2020-03-15
Attending: EMERGENCY MEDICINE | Admitting: HOSPITALIST
Payer: MEDICAID

## 2020-03-14 DIAGNOSIS — E10.10 DIABETIC KETOACIDOSIS WITHOUT COMA ASSOCIATED WITH TYPE 1 DIABETES MELLITUS: ICD-10-CM

## 2020-03-14 DIAGNOSIS — N12 PYELONEPHRITIS: Primary | ICD-10-CM

## 2020-03-14 DIAGNOSIS — R07.9 CHEST PAIN: ICD-10-CM

## 2020-03-14 LAB
ALBUMIN SERPL BCP-MCNC: 2.7 G/DL (ref 3.5–5.2)
ALLENS TEST: ABNORMAL
ALP SERPL-CCNC: 111 U/L (ref 55–135)
ALT SERPL W/O P-5'-P-CCNC: 9 U/L (ref 10–44)
ANION GAP SERPL CALC-SCNC: 16 MMOL/L (ref 8–16)
AST SERPL-CCNC: 7 U/L (ref 10–40)
B-OH-BUTYR BLD STRIP-SCNC: 2.3 MMOL/L (ref 0–0.5)
BACTERIA #/AREA URNS HPF: ABNORMAL /HPF
BASOPHILS # BLD AUTO: 0.07 K/UL (ref 0–0.2)
BASOPHILS NFR BLD: 0.4 % (ref 0–1.9)
BILIRUB SERPL-MCNC: 0.3 MG/DL (ref 0.1–1)
BILIRUB UR QL STRIP: ABNORMAL
BUN SERPL-MCNC: 7 MG/DL (ref 6–20)
CALCIUM SERPL-MCNC: 8.5 MG/DL (ref 8.7–10.5)
CHLORIDE SERPL-SCNC: 100 MMOL/L (ref 95–110)
CLARITY UR: CLEAR
CO2 SERPL-SCNC: 15 MMOL/L (ref 23–29)
COLOR UR: YELLOW
CREAT SERPL-MCNC: 0.9 MG/DL (ref 0.5–1.4)
DELSYS: ABNORMAL
DIFFERENTIAL METHOD: ABNORMAL
EOSINOPHIL # BLD AUTO: 0 K/UL (ref 0–0.5)
EOSINOPHIL NFR BLD: 0.1 % (ref 0–8)
ERYTHROCYTE [DISTWIDTH] IN BLOOD BY AUTOMATED COUNT: 13.2 % (ref 11.5–14.5)
EST. GFR  (AFRICAN AMERICAN): >60 ML/MIN/1.73 M^2
EST. GFR  (NON AFRICAN AMERICAN): >60 ML/MIN/1.73 M^2
GLUCOSE SERPL-MCNC: 350 MG/DL (ref 70–110)
GLUCOSE UR QL STRIP: ABNORMAL
HCO3 UR-SCNC: 18.4 MMOL/L (ref 24–28)
HCT VFR BLD AUTO: 39.3 % (ref 37–48.5)
HGB BLD-MCNC: 13.1 G/DL (ref 12–16)
HGB UR QL STRIP: ABNORMAL
IMM GRANULOCYTES # BLD AUTO: 0.1 K/UL (ref 0–0.04)
KETONES UR QL STRIP: ABNORMAL
LEUKOCYTE ESTERASE UR QL STRIP: NEGATIVE
LYMPHOCYTES # BLD AUTO: 1.4 K/UL (ref 1–4.8)
LYMPHOCYTES NFR BLD: 8.4 % (ref 18–48)
MCH RBC QN AUTO: 31.2 PG (ref 27–31)
MCHC RBC AUTO-ENTMCNC: 33.3 G/DL (ref 32–36)
MCV RBC AUTO: 94 FL (ref 82–98)
MICROSCOPIC COMMENT: ABNORMAL
MONOCYTES # BLD AUTO: 1.3 K/UL (ref 0.3–1)
MONOCYTES NFR BLD: 7.4 % (ref 4–15)
NEUTROPHILS # BLD AUTO: 14.2 K/UL (ref 1.8–7.7)
NEUTROPHILS NFR BLD: 83.1 % (ref 38–73)
NITRITE UR QL STRIP: NEGATIVE
NRBC BLD-RTO: 0 /100 WBC
PCO2 BLDA: 32.2 MMHG (ref 35–45)
PH SMN: 7.37 [PH] (ref 7.35–7.45)
PH UR STRIP: 6 [PH] (ref 5–8)
PLATELET # BLD AUTO: 369 K/UL (ref 150–350)
PMV BLD AUTO: 11.3 FL (ref 9.2–12.9)
PO2 BLDA: 30 MMHG (ref 40–60)
POC BE: -7 MMOL/L
POC SATURATED O2: 55 % (ref 95–100)
POC TCO2: 19 MMOL/L (ref 24–29)
POCT GLUCOSE: 217 MG/DL (ref 70–110)
POTASSIUM SERPL-SCNC: 3.7 MMOL/L (ref 3.5–5.1)
PROT SERPL-MCNC: 6.6 G/DL (ref 6–8.4)
PROT UR QL STRIP: NEGATIVE
RBC # BLD AUTO: 4.2 M/UL (ref 4–5.4)
RBC #/AREA URNS HPF: 5 /HPF (ref 0–4)
SAMPLE: ABNORMAL
SITE: ABNORMAL
SODIUM SERPL-SCNC: 131 MMOL/L (ref 136–145)
SP GR UR STRIP: 1.02 (ref 1–1.03)
SQUAMOUS #/AREA URNS HPF: 35 /HPF
URN SPEC COLLECT METH UR: ABNORMAL
UROBILINOGEN UR STRIP-ACNC: NEGATIVE EU/DL
WBC # BLD AUTO: 17.08 K/UL (ref 3.9–12.7)
WBC #/AREA URNS HPF: 13 /HPF (ref 0–5)
WBC CLUMPS URNS QL MICRO: ABNORMAL
YEAST URNS QL MICRO: ABNORMAL

## 2020-03-14 PROCEDURE — 87086 URINE CULTURE/COLONY COUNT: CPT

## 2020-03-14 PROCEDURE — 96375 TX/PRO/DX INJ NEW DRUG ADDON: CPT

## 2020-03-14 PROCEDURE — 96365 THER/PROPH/DIAG IV INF INIT: CPT

## 2020-03-14 PROCEDURE — 81000 URINALYSIS NONAUTO W/SCOPE: CPT

## 2020-03-14 PROCEDURE — 36415 COLL VENOUS BLD VENIPUNCTURE: CPT

## 2020-03-14 PROCEDURE — 25000003 PHARM REV CODE 250: Performed by: EMERGENCY MEDICINE

## 2020-03-14 PROCEDURE — 82803 BLOOD GASES ANY COMBINATION: CPT

## 2020-03-14 PROCEDURE — 99291 CRITICAL CARE FIRST HOUR: CPT | Mod: 25

## 2020-03-14 PROCEDURE — 82962 GLUCOSE BLOOD TEST: CPT

## 2020-03-14 PROCEDURE — 84702 CHORIONIC GONADOTROPIN TEST: CPT

## 2020-03-14 PROCEDURE — 80053 COMPREHEN METABOLIC PANEL: CPT

## 2020-03-14 PROCEDURE — 63600175 PHARM REV CODE 636 W HCPCS: Performed by: EMERGENCY MEDICINE

## 2020-03-14 PROCEDURE — 85025 COMPLETE CBC W/AUTO DIFF WBC: CPT

## 2020-03-14 PROCEDURE — 82010 KETONE BODYS QUAN: CPT

## 2020-03-14 RX ORDER — METOCLOPRAMIDE HYDROCHLORIDE 5 MG/ML
10 INJECTION INTRAMUSCULAR; INTRAVENOUS
Status: COMPLETED | OUTPATIENT
Start: 2020-03-14 | End: 2020-03-14

## 2020-03-14 RX ORDER — SODIUM CHLORIDE 9 MG/ML
1000 INJECTION, SOLUTION INTRAVENOUS
Status: COMPLETED | OUTPATIENT
Start: 2020-03-14 | End: 2020-03-14

## 2020-03-14 RX ORDER — ACETAMINOPHEN 500 MG
1000 TABLET ORAL
Status: COMPLETED | OUTPATIENT
Start: 2020-03-14 | End: 2020-03-14

## 2020-03-14 RX ADMIN — CEFTRIAXONE 1 G: 1 INJECTION, SOLUTION INTRAVENOUS at 10:03

## 2020-03-14 RX ADMIN — METOCLOPRAMIDE 10 MG: 5 INJECTION, SOLUTION INTRAMUSCULAR; INTRAVENOUS at 09:03

## 2020-03-14 RX ADMIN — INSULIN HUMAN 5 UNITS: 100 INJECTION, SOLUTION PARENTERAL at 11:03

## 2020-03-14 RX ADMIN — SODIUM CHLORIDE 1000 ML: 0.9 INJECTION, SOLUTION INTRAVENOUS at 09:03

## 2020-03-14 RX ADMIN — ACETAMINOPHEN 1000 MG: 500 TABLET ORAL at 09:03

## 2020-03-15 VITALS
HEART RATE: 93 BPM | WEIGHT: 120 LBS | HEIGHT: 63 IN | BODY MASS INDEX: 21.26 KG/M2 | RESPIRATION RATE: 18 BRPM | TEMPERATURE: 98 F | SYSTOLIC BLOOD PRESSURE: 105 MMHG | DIASTOLIC BLOOD PRESSURE: 68 MMHG | OXYGEN SATURATION: 98 %

## 2020-03-15 PROBLEM — N30.00 ACUTE CYSTITIS: Status: ACTIVE | Noted: 2020-03-15

## 2020-03-15 PROBLEM — O23.11: Status: ACTIVE | Noted: 2020-03-15

## 2020-03-15 PROBLEM — E10.65 CONTROLLED TYPE 1 DIABETES MELLITUS WITH HYPERGLYCEMIA: Status: ACTIVE | Noted: 2019-05-24

## 2020-03-15 PROBLEM — Z3A.01 LESS THAN 8 WEEKS GESTATION OF PREGNANCY: Status: ACTIVE | Noted: 2020-03-15

## 2020-03-15 PROBLEM — E87.1 HYPONATREMIA: Status: ACTIVE | Noted: 2020-03-15

## 2020-03-15 PROBLEM — O24.011: Status: ACTIVE | Noted: 2019-05-24

## 2020-03-15 PROBLEM — E83.51 HYPOCALCEMIA: Status: ACTIVE | Noted: 2020-03-15

## 2020-03-15 PROBLEM — O21.9 NAUSEA AND VOMITING IN PREGNANCY: Status: ACTIVE | Noted: 2020-03-15

## 2020-03-15 PROBLEM — E87.6 HYPOKALEMIA: Status: ACTIVE | Noted: 2020-03-15

## 2020-03-15 PROBLEM — Z72.0 TOBACCO ABUSE: Status: ACTIVE | Noted: 2020-03-15

## 2020-03-15 LAB
ANION GAP SERPL CALC-SCNC: 11 MMOL/L (ref 8–16)
BASOPHILS # BLD AUTO: 0.03 K/UL (ref 0–0.2)
BASOPHILS NFR BLD: 0.2 % (ref 0–1.9)
BUN SERPL-MCNC: 5 MG/DL (ref 6–20)
CALCIUM SERPL-MCNC: 8.2 MG/DL (ref 8.7–10.5)
CHLORIDE SERPL-SCNC: 104 MMOL/L (ref 95–110)
CO2 SERPL-SCNC: 16 MMOL/L (ref 23–29)
CREAT SERPL-MCNC: 0.8 MG/DL (ref 0.5–1.4)
DIFFERENTIAL METHOD: ABNORMAL
EOSINOPHIL # BLD AUTO: 0 K/UL (ref 0–0.5)
EOSINOPHIL NFR BLD: 0.1 % (ref 0–8)
ERYTHROCYTE [DISTWIDTH] IN BLOOD BY AUTOMATED COUNT: 12 % (ref 11.5–14.5)
EST. GFR  (AFRICAN AMERICAN): >60 ML/MIN/1.73 M^2
EST. GFR  (NON AFRICAN AMERICAN): >60 ML/MIN/1.73 M^2
GLUCOSE SERPL-MCNC: 324 MG/DL (ref 70–110)
HCG INTACT+B SERPL-ACNC: NORMAL MIU/ML
HCT VFR BLD AUTO: 33.9 % (ref 37–48.5)
HGB BLD-MCNC: 10.7 G/DL (ref 12–16)
IMM GRANULOCYTES # BLD AUTO: 0.09 K/UL (ref 0–0.04)
LACTATE SERPL-SCNC: 0.8 MMOL/L (ref 0.5–2.2)
LYMPHOCYTES # BLD AUTO: 1.3 K/UL (ref 1–4.8)
LYMPHOCYTES NFR BLD: 9.6 % (ref 18–48)
MAGNESIUM SERPL-MCNC: 1.7 MG/DL (ref 1.6–2.6)
MCH RBC QN AUTO: 30.7 PG (ref 27–31)
MCHC RBC AUTO-ENTMCNC: 31.6 G/DL (ref 32–36)
MCV RBC AUTO: 97 FL (ref 82–98)
MONOCYTES # BLD AUTO: 1.2 K/UL (ref 0.3–1)
MONOCYTES NFR BLD: 8.9 % (ref 4–15)
NEUTROPHILS # BLD AUTO: 10.9 K/UL (ref 1.8–7.7)
NEUTROPHILS NFR BLD: 80.5 % (ref 38–73)
NRBC BLD-RTO: 0 /100 WBC
PHOSPHATE SERPL-MCNC: 1.4 MG/DL (ref 2.7–4.5)
PLATELET # BLD AUTO: 285 K/UL (ref 150–350)
PMV BLD AUTO: 10.3 FL (ref 9.2–12.9)
POCT GLUCOSE: 156 MG/DL (ref 70–110)
POCT GLUCOSE: 205 MG/DL (ref 70–110)
POCT GLUCOSE: 341 MG/DL (ref 70–110)
POCT GLUCOSE: 355 MG/DL (ref 70–110)
POTASSIUM SERPL-SCNC: 3.9 MMOL/L (ref 3.5–5.1)
RBC # BLD AUTO: 3.49 M/UL (ref 4–5.4)
SODIUM SERPL-SCNC: 131 MMOL/L (ref 136–145)
WBC # BLD AUTO: 13.47 K/UL (ref 3.9–12.7)

## 2020-03-15 PROCEDURE — 85025 COMPLETE CBC W/AUTO DIFF WBC: CPT

## 2020-03-15 PROCEDURE — G0378 HOSPITAL OBSERVATION PER HR: HCPCS

## 2020-03-15 PROCEDURE — 25000003 PHARM REV CODE 250: Performed by: NURSE PRACTITIONER

## 2020-03-15 PROCEDURE — 96372 THER/PROPH/DIAG INJ SC/IM: CPT

## 2020-03-15 PROCEDURE — 36415 COLL VENOUS BLD VENIPUNCTURE: CPT

## 2020-03-15 PROCEDURE — C9399 UNCLASSIFIED DRUGS OR BIOLOG: HCPCS | Performed by: NURSE PRACTITIONER

## 2020-03-15 PROCEDURE — 84100 ASSAY OF PHOSPHORUS: CPT

## 2020-03-15 PROCEDURE — 63600175 PHARM REV CODE 636 W HCPCS: Performed by: NURSE PRACTITIONER

## 2020-03-15 PROCEDURE — 96361 HYDRATE IV INFUSION ADD-ON: CPT

## 2020-03-15 PROCEDURE — 83735 ASSAY OF MAGNESIUM: CPT

## 2020-03-15 PROCEDURE — 80048 BASIC METABOLIC PNL TOTAL CA: CPT

## 2020-03-15 PROCEDURE — 82962 GLUCOSE BLOOD TEST: CPT

## 2020-03-15 PROCEDURE — 83605 ASSAY OF LACTIC ACID: CPT

## 2020-03-15 RX ORDER — LANOLIN ALCOHOL/MO/W.PET/CERES
800 CREAM (GRAM) TOPICAL
Status: DISCONTINUED | OUTPATIENT
Start: 2020-03-15 | End: 2020-03-15 | Stop reason: HOSPADM

## 2020-03-15 RX ORDER — GLUCAGON 1 MG
1 KIT INJECTION
Status: DISCONTINUED | OUTPATIENT
Start: 2020-03-15 | End: 2020-03-15 | Stop reason: HOSPADM

## 2020-03-15 RX ORDER — POTASSIUM CHLORIDE 20 MEQ/15ML
60 SOLUTION ORAL
Status: DISCONTINUED | OUTPATIENT
Start: 2020-03-15 | End: 2020-03-15 | Stop reason: HOSPADM

## 2020-03-15 RX ORDER — INSULIN ASPART 100 [IU]/ML
1-10 INJECTION, SOLUTION INTRAVENOUS; SUBCUTANEOUS
Status: DISCONTINUED | OUTPATIENT
Start: 2020-03-15 | End: 2020-03-15 | Stop reason: HOSPADM

## 2020-03-15 RX ORDER — ACETAMINOPHEN 325 MG/1
650 TABLET ORAL EVERY 4 HOURS PRN
Status: DISCONTINUED | OUTPATIENT
Start: 2020-03-15 | End: 2020-03-15 | Stop reason: HOSPADM

## 2020-03-15 RX ORDER — POTASSIUM CHLORIDE 20 MEQ/15ML
40 SOLUTION ORAL
Status: DISCONTINUED | OUTPATIENT
Start: 2020-03-15 | End: 2020-03-15 | Stop reason: HOSPADM

## 2020-03-15 RX ORDER — PRENATAL WITH FERROUS FUM AND FOLIC ACID 3080; 920; 120; 400; 22; 1.84; 3; 20; 10; 1; 12; 200; 27; 25; 2 [IU]/1; [IU]/1; MG/1; [IU]/1; MG/1; MG/1; MG/1; MG/1; MG/1; MG/1; UG/1; MG/1; MG/1; MG/1; MG/1
1 TABLET ORAL DAILY
Status: DISCONTINUED | OUTPATIENT
Start: 2020-03-15 | End: 2020-03-15 | Stop reason: HOSPADM

## 2020-03-15 RX ORDER — AMOXICILLIN AND CLAVULANATE POTASSIUM 875; 125 MG/1; MG/1
1 TABLET, FILM COATED ORAL EVERY 12 HOURS
Qty: 12 TABLET | Refills: 0 | Status: SHIPPED | OUTPATIENT
Start: 2020-03-15 | End: 2020-03-21

## 2020-03-15 RX ORDER — IBUPROFEN 200 MG
24 TABLET ORAL
Status: DISCONTINUED | OUTPATIENT
Start: 2020-03-15 | End: 2020-03-15 | Stop reason: HOSPADM

## 2020-03-15 RX ORDER — ONDANSETRON 4 MG/1
4 TABLET, FILM COATED ORAL EVERY 6 HOURS PRN
Qty: 20 TABLET | Refills: 0 | Status: SHIPPED | OUTPATIENT
Start: 2020-03-15

## 2020-03-15 RX ORDER — IBUPROFEN 200 MG
16 TABLET ORAL
Status: DISCONTINUED | OUTPATIENT
Start: 2020-03-15 | End: 2020-03-15 | Stop reason: HOSPADM

## 2020-03-15 RX ORDER — SODIUM CHLORIDE 9 MG/ML
INJECTION, SOLUTION INTRAVENOUS CONTINUOUS
Status: DISCONTINUED | OUTPATIENT
Start: 2020-03-15 | End: 2020-03-15 | Stop reason: HOSPADM

## 2020-03-15 RX ORDER — SODIUM CHLORIDE 0.9 % (FLUSH) 0.9 %
10 SYRINGE (ML) INJECTION
Status: DISCONTINUED | OUTPATIENT
Start: 2020-03-15 | End: 2020-03-15 | Stop reason: HOSPADM

## 2020-03-15 RX ORDER — SODIUM CHLORIDE 9 MG/ML
1000 INJECTION, SOLUTION INTRAVENOUS
Status: COMPLETED | OUTPATIENT
Start: 2020-03-15 | End: 2020-03-15

## 2020-03-15 RX ORDER — PYRIDOXINE HCL (VITAMIN B6) 25 MG
25 TABLET ORAL EVERY 8 HOURS PRN
Status: DISCONTINUED | OUTPATIENT
Start: 2020-03-15 | End: 2020-03-15 | Stop reason: HOSPADM

## 2020-03-15 RX ADMIN — SODIUM CHLORIDE 1000 ML: 0.9 INJECTION, SOLUTION INTRAVENOUS at 12:03

## 2020-03-15 RX ADMIN — INSULIN DETEMIR 12 UNITS: 100 INJECTION, SOLUTION SUBCUTANEOUS at 09:03

## 2020-03-15 RX ADMIN — SODIUM CHLORIDE: 0.9 INJECTION, SOLUTION INTRAVENOUS at 02:03

## 2020-03-15 RX ADMIN — Medication 25 MG: at 06:03

## 2020-03-15 RX ADMIN — SODIUM CHLORIDE: 0.9 INJECTION, SOLUTION INTRAVENOUS at 10:03

## 2020-03-15 RX ADMIN — ACETAMINOPHEN 650 MG: 325 TABLET ORAL at 06:03

## 2020-03-15 RX ADMIN — PRENATAL VIT W/ FE FUMARATE-FA TAB 27-0.8 MG 1 TABLET: 27-0.8 TAB at 09:03

## 2020-03-15 RX ADMIN — INSULIN ASPART 10 UNITS: 100 INJECTION, SOLUTION INTRAVENOUS; SUBCUTANEOUS at 06:03

## 2020-03-15 RX ADMIN — INSULIN ASPART 8 UNITS: 100 INJECTION, SOLUTION INTRAVENOUS; SUBCUTANEOUS at 12:03

## 2020-03-15 NOTE — PLAN OF CARE
03/15/20 1410   Final Note   Assessment Type Final Discharge Note   Anticipated Discharge Disposition Home   Right Care Referral Info   Post Acute Recommendation No Care

## 2020-03-15 NOTE — ED NOTES
Patient awake, alert and oriented x 3, skin warm and dry, in NAD with family at bedside.  Hep Lock in place, site OK, side rails up, call bell within reach.

## 2020-03-15 NOTE — ASSESSMENT & PLAN NOTE
Patient's FSGs are controlled on current hypoglycemics.   Last A1c reviewed-   Lab Results   Component Value Date    HGBA1C 10.2 (H) 01/17/2020     Most recent fingerstick glucose reviewed-   Recent Labs   Lab 03/14/20  2330 03/15/20  0027   POCTGLUCOSE 217* 156*     Current correctional scale  Medium  Maintain anti-hyperglycemic dose as follows-   Antihyperglycemics (From admission, onward)    Start     Stop Route Frequency Ordered    03/15/20 0900  insulin detemir U-100 pen 12 Units      -- SubQ 2 times daily 03/15/20 0351    03/15/20 0556  insulin aspart U-100 pen 1-10 Units      -- SubQ Before meals & nightly PRN 03/15/20 0456        Likely experiencing hyperglycemia secondary to infection and dehydration.  Accu-Cheks AC/HS.  Diabetic diet.  Continue long-acting insulin b.i.d. and sliding scale p.r.n.

## 2020-03-15 NOTE — PLAN OF CARE
Plan of care reviewed with pt, pt verbalized understanding. IV site clean, dry, intact, no redness or swelling noted. Pt ambulated in room this shift, remains free of fall/trauma. VSS. Pt tolerating meals/liquids well, no reports of n/v. Purposeful q2h rounding done throughout shift, safety maintained, call light in reach, bed locked and in lowest position, side rails up x2. Will continue to monitor, observe and report any changes.

## 2020-03-15 NOTE — ASSESSMENT & PLAN NOTE
Patient reports being for 7-8 weeks pregnant.  Confirmed with beta hCG.  OB ultrasound obtained to confirm intrauterine pregnancy-Single live IUP, estimated gestation 5w, 5d.  Prenatal vitamin daily.  Patient reports having outpatient appointment with OB for follow-up care.

## 2020-03-15 NOTE — ED NOTES
Patient awake, alert and oriented x 3, skin warm and dry, in NAD.  Hep Lock in place, site OK, side rails up, call bell within reach.

## 2020-03-15 NOTE — DISCHARGE SUMMARY
[facility] Hospital Medicine  Discharge Summary      Patient Name: Tish Landeros  MRN: [id number]  Admission Date: 3/14/2020  Hospital Length of Stay: 0 days  Discharge Date and Time:  03/15/2020 1:39 PM  Attending Physician: Christa Lynn MD   Discharging Provider: Logan Simons MD  Primary Care Provider: Primary Doctor No        HPI: Tish Landeros is a 34 y.o. female with a past medical history of type 1 diabetes, tobacco abuse, and pyelonephritis who presents to the ED with complaints of left lower abdominal pain radiating to her flank area that began about 5 days ago.  She denies any aggravating factors but reports alleviation of pain after receiving IV fluids and antibiotics in the ED.  Patient endorses associated nausea, fever, and decreased appetite intermittently over the last 5 days.  Patient denies any chest pain, shortness of breath, vomiting, diarrhea, vaginal bleeding, weakness, or headache. She reports taking her insulin as prescribed. Of note the patient reports being around 6-8 weeks pregnant, she has not seen her OB/GYN since her positive pregnancy test but has an appointment scheduled. Her ED workup is significant for hyperglycemia, UTI, elevated hydroxybutyrate, hyponatremia, hypocalcemia, and positive pregnancy test.  The patient received 2 L normal saline, IV Rocephin, and Reglan in the ED.  The patient will be placed in observation under Hospital Medicine for further workup and evaluation.    * No surgery found *      Hospital Course: The patient was admitted and started on Rocephin and given aggressive fluid resuscitation. She responded well and was clinically improved overnight.  She was re-evaluated and requested that she be discharge given childcare issues.  She was asked if there was anyway that she could make arrangements but there wasn't, so she will be discharged on oral abx and with instructions on insulin adjustment.  She was in agreement with this  plan and insists she will follow up with her doctor this week.     Consults:   Consults (From admission, onward)        Status Ordering Provider     Inpatient consult to Registered Dietitian/Nutritionist  Once     Provider:  (Not yet assigned)    Acknowledged CADEN NEFF          Final Active Diagnoses:    Diagnosis Date Noted POA    PRINCIPAL PROBLEM:  Acute cystitis in pregnancy, antepartum, first trimester [O23.11] 03/15/2020 Yes    Hyponatremia [E87.1] 03/15/2020 Yes    Hypocalcemia [E83.51] 03/15/2020 Yes    Less than 8 weeks gestation of pregnancy [Z3A.01] 03/15/2020 Not Applicable    Nausea and vomiting in pregnancy [O21.9] 03/15/2020 Yes    Tobacco abuse [Z72.0] 03/15/2020 Yes    Pre-existing type 1 diabetes mellitus with hyperglycemia during pregnancy in first trimester [O24.011, E10.65] 05/24/2019 Yes      Problems Resolved During this Admission:      Discharged Condition: stable    Disposition: Home or Self Care    Follow Up:  Follow-up Information     Primary Doctor No.    Why:  1-2 weeks               Patient Instructions:      Diet diabetic     Notify your health care provider if you experience any of the following:  persistent nausea and vomiting or diarrhea     Notify your health care provider if you experience any of the following:  temperature >100.4     Notify your health care provider if you experience any of the following:  increased confusion or weakness     Activity as tolerated     Medications:  Reconciled Home Medications:      Medication List      START taking these medications    amoxicillin-clavulanate 875-125mg 875-125 mg per tablet  Commonly known as:  AUGMENTIN  Take 1 tablet by mouth every 12 (twelve) hours. for 6 days     ondansetron 4 MG tablet  Commonly known as:  ZOFRAN  Take 1 tablet (4 mg total) by mouth every 6 (six) hours as needed for Nausea.        CHANGE how you take these medications    insulin aspart U-100 100 unit/mL injection  Commonly known as:  NovoLOG  U-100 Insulin aspart  10 units prn elevated glucose  Maximun 40 units daily  What changed:    · how much to take  · how to take this  · when to take this  · reasons to take this        CONTINUE taking these medications    ibuprofen 600 MG tablet  Commonly known as:  ADVIL,MOTRIN  Take 1 tablet (600 mg total) by mouth every 6 (six) hours as needed for Pain.     insulin glargine 100 units/mL (3mL) SubQ pen  Commonly known as:  BASAGLAR KWIKPEN U-100 INSULIN  Inject 18 Units into the skin 2 (two) times daily.            Significant Diagnostic Studies: Labs:   CBC   Recent Labs   Lab 03/14/20  2155 03/15/20  0904   WBC 17.08* 13.47*   HGB 13.1 10.7*   HCT 39.3 33.9*   * 285       Pending Diagnostic Studies:     None        Indwelling Lines/Drains at time of discharge:   Lines/Drains/Airways     None                 Time spent on the discharge of patient: 25 minutes  Patient was seen and examined on the date of discharge and determined to be suitable for discharge.         Logan Simons MD  Department of Hospital Medicine  [facility]

## 2020-03-15 NOTE — HPI
Tish Landeros is a 34 y.o. female with a past medical history of type 1 diabetes, tobacco abuse, and pyelonephritis who presents to the ED with complaints of left lower abdominal pain radiating to her flank area that began about 5 days ago.  She denies any aggravating factors but reports alleviation of pain after receiving IV fluids and antibiotics in the ED.  Patient endorses associated nausea, fever, and decreased appetite intermittently over the last 5 days.  Patient denies any chest pain, shortness of breath, vomiting, diarrhea, vaginal bleeding, weakness, or headache. She reports taking her insulin as prescribed. Of note the patient reports being around 6-8 weeks pregnant, she has not seen her OB/GYN since her positive pregnancy test but has an appointment scheduled. Her ED workup is significant for hyperglycemia, UTI, elevated hydroxybutyrate, hyponatremia, hypocalcemia, and positive pregnancy test.  The patient received 2 L normal saline, IV Rocephin, and Reglan in the ED.  The patient will be placed in observation under Hospital Medicine for further workup and evaluation.

## 2020-03-15 NOTE — DISCHARGE INSTRUCTIONS
Thank you for choosing [facility] for your medical care. The primary doctor who is taking care of you at the time of your discharge is Christa Lynn MD.     You were admitted to the hospital with Acute cystitis in pregnancy, antepartum, first trimester.     Please note your discharge instructions, including diet/activity restrictions, follow-up appointments, and medication changes.  If you have any questions about your medical issues, prescriptions, or any other questions, please feel free to contact the [facility] Medicine Dept at [phone] and we will help.    If you are previously with Home health, outpatient PT/OT or under a therapy program, you are cleared to return to those programs.    Please direct all long term medication refills and follow up to your primary care provider, Primary Doctor No. Thank you again for letting us take care of your health care needs.    Please note the following discharge instructions per your discharging physician-  1. Take all medicines as directed  2. Stay well hydrated  3. Follow up with PCP and OB  4. Continue daily prenatal vitamin

## 2020-03-15 NOTE — PLAN OF CARE
Patient adamantly requesting discharge.  She is clinically improved and has no complaints.  She states that her blood glucose at home is normally well controlled and she self titrates her insulin.  I told her I would rather that she stay overnight but she has childcare issues and given that she looks well clinically, I will discharge her on Augmentin with strict ER return precautions.  She was appreciate and in agreement with this plan.

## 2020-03-15 NOTE — ASSESSMENT & PLAN NOTE
-UA reviewed, follow culture.  Reviewed previous urine culture, positive for E coli, pansensitive.  -Lactic acid negative.  -Patient received multiple fluid boluses in ED.  -Rocephin given in the ED.  -Will plan to transition to oral antibiotics and possibly discharge later today if blood glucose remains stable and patient is able to tolerate p.o. Intake.  -Will defer to the attending for antibiotic choice.  -Renal ultrasound reviewed, no acute abnormality noted.

## 2020-03-15 NOTE — ASSESSMENT & PLAN NOTE
Likely secondary to infection and hyperglycemia.  Patient reports improvement of symptoms after receiving IV fluid boluses and dose of Reglan in the ED.  P.r.n. pyridoxine for nausea/vomiting.

## 2020-03-15 NOTE — ED PROVIDER NOTES
Encounter Date: 3/14/2020    SCRIBE #1 NOTE: Felice CANTRELL, am scribing for, and in the presence of, Juan Whyte MD.       History     Chief Complaint   Patient presents with    Abdominal Pain     left flank pain.       Time seen by provider: 9:33 PM on 2020    Tish Landeros is a 34 y.o. female who presents to the ED with an onset of left abdominal pain radiating to her left back beginning x4 days ago. Associated symptoms include fever, nausea, decreased appetite, and cough. The patient endorses taking her insulin today. The patient endorses being x2 month pregnant. The patient denies any other symptoms at this time. Pertinent PMHx includes DM, HPV, . No PSHx.      The history is provided by the patient.     Review of patient's allergies indicates:   Allergen Reactions    Ciprofloxacin      Past Medical History:   Diagnosis Date    Abnormal Pap smear of cervix     Diabetes in pregnancy 5/3/2019    Diabetes mellitus     diagnosed 6years ago.     Diabetes mellitus complicating pregnancy 2019    HPV in female     Pre-existing type 1 diabetes mellitus during pregnancy in third trimester 2019    Type 1 diabetes mellitus complicating pregnancy, antepartum 2019     Past Surgical History:   Procedure Laterality Date    CERVICAL BIOPSY  W/ LOOP ELECTRODE EXCISION       Family History   Problem Relation Age of Onset    Diabetes Father         type 1    Hypertension Mother     Heart disease Mother         stent    Congenital heart disease Paternal Aunt          of hole in heart    Diabetes Sister         Juvenile DM    Arrhythmia Neg Hx     Cardiomyopathy Neg Hx     Pacemaker/defibrilator Neg Hx     Heart attacks under age 50 Neg Hx     Breast cancer Neg Hx     Ovarian cancer Neg Hx      Social History     Tobacco Use    Smoking status: Current Every Day Smoker     Packs/day: 0.50     Years: 10.00     Pack years: 5.00    Smokeless tobacco: Never Used   Substance Use  Topics    Alcohol use: Yes     Frequency: Never    Drug use: No     Review of Systems   Constitutional: Positive for appetite change (decreased) and fever. Negative for activity change, chills and fatigue.   Eyes: Negative for visual disturbance.   Respiratory: Positive for cough. Negative for apnea and shortness of breath.    Cardiovascular: Negative for chest pain and palpitations.   Gastrointestinal: Positive for nausea. Negative for abdominal distention and abdominal pain.   Genitourinary: Negative for difficulty urinating.   Musculoskeletal: Negative for neck pain.   Skin: Negative for pallor and rash.   Neurological: Negative for headaches.   Hematological: Does not bruise/bleed easily.   Psychiatric/Behavioral: Negative for agitation.       Physical Exam     Initial Vitals [03/14/20 2125]   BP Pulse Resp Temp SpO2   101/72 108 20 98.7 °F (37.1 °C) 99 %      MAP       --         Physical Exam    Nursing note and vitals reviewed.  Constitutional: She appears well-developed and well-nourished.   HENT:   Head: Normocephalic and atraumatic.   Eyes: Conjunctivae are normal.   Neck: Normal range of motion. Neck supple.   Cardiovascular: Normal rate, regular rhythm and normal heart sounds. Exam reveals no gallop and no friction rub.    No murmur heard.  Pulmonary/Chest: Effort normal and breath sounds normal. No respiratory distress. She has no wheezes. She has no rhonchi. She has no rales.   Abdominal: Soft. She exhibits no distension. There is tenderness.   Left sided abdominal pain. Left flank pain.   Musculoskeletal: Normal range of motion.   Neurological: She is alert and oriented to person, place, and time.   Skin: Skin is warm and dry. No erythema.   Psychiatric: She has a normal mood and affect.         ED Course   Critical Care  Date/Time: 3/14/2020 11:24 PM  Performed by: Juan Whyte III, MD  Authorized by: Juan Whyte III, MD   Direct patient critical care time: 45 minutes  Total critical care  time (exclusive of procedural time) : 45 minutes  Critical care was necessary to treat or prevent imminent or life-threatening deterioration of the following conditions: endocrine crisis.  Critical care was time spent personally by me on the following activities: review of old charts, pulse oximetry, ordering and review of laboratory studies, obtaining history from patient or surrogate, evaluation of patient's response to treatment, development of treatment plan with patient or surrogate, discussions with primary provider, ordering and performing treatments and interventions, examination of patient and re-evaluation of patient's condition.  Subsequent provider of critical care: I assumed direction of critical care for this patient from another provider of my specialty.        Labs Reviewed - No data to display       Imaging Results    None          Medical Decision Making:   History:   Old Medical Records: I decided to obtain old medical records.  Clinical Tests:   Lab Tests: Ordered and Reviewed  ED Management:  34-year-old female who is 8 weeks pregnant with a history of pyelonephritis presents with left flank pain.  She has had subjective fever.  Urinalysis suggest pyelonephritis.  She has symptomatic relief with Tylenol.  She has mild DKA and warrants IV antibiotics with IV insulin.  Other:   I have discussed this case with another health care provider.       APC / Resident Notes:   I, Dr. Juan Whyte III, personally performed the services described in this documentation. All medical record entries made by the scribe were at my direction and in my presence.  I have reviewed the chart and agree that the record reflects my personal performance and is accurate and complete       Scribe Attestation:   Scribe #1: I performed the above scribed service and the documentation accurately describes the services I performed. I attest to the accuracy of the note.                          Clinical Impression:        ICD-10-CM ICD-9-CM   1. Pyelonephritis N12 590.80   2. Diabetic ketoacidosis without coma associated with type 1 diabetes mellitus E10.10 250.11                                Juan Whyte III, MD  03/14/20 9868

## 2020-03-15 NOTE — PLAN OF CARE
SW met with patient to complete a discharge planning assessment. Patient presents as alert and oriented x 4, pleasant, good eye contact, well groomed, recall good, concentration/judgement good, average intelligence, calm, communicative, cooperative and asking and answering questions appropriately. SW verified all information on demographic sheet is correct. Patient reports living with significant other, son, sister and and sister's botfriend and that she is independent with ADLs at this time and does drive. Patient reports significant other and sister will transport pt home.    Patient reports does not have home health. Patient reports that she is not receiving outside resources. Patient reports having a glucometer. Patient reports having a new primary MD that she hasn't seen yet and has [employer] as their insurance. Patient reports receiving medications from [facility] on [address] and reports that she is able to afford medications at this time and at time of discharge. Patient reports that she is not on dialysis at this time. Patient reports that she is not receiving services with the coumadin clinic. Patient reports has not been admitted to the hospital within the last 30 days.    Patient reports does not have a Living Will or Healthcare Power of . Patient denies mental health issues.    Patient expressed no other needs at this time. SW remains available.       03/15/20 7657   Discharge Assessment   Assessment Type Discharge Planning Assessment   Confirmed/corrected address and phone number on facesheet? Yes   Assessment information obtained from? Patient   Prior to hospitilization cognitive status: Alert/Oriented   Prior to hospitalization functional status: Independent   Current cognitive status: Alert/Oriented   Current Functional Status: Independent   Lives With significant other;other relative(s);sibling(s);child(nicholas), dependent  (Pt reports living with significant other, pt's  sister, pt's minor son, and sister's boyfriend)   Able to Return to Prior Arrangements yes   Is patient able to care for self after discharge? Yes   Who are your caregiver(s) and their phone number(s)? Eric Hickman (mother) [phone]; Juan Álvarez (significant other) [phone]   Patient's perception of discharge disposition home or selfcare   Readmission Within the Last 30 Days no previous admission in last 30 days   Patient currently being followed by outpatient case management? No   Patient currently receives any other outside agency services? No   Equipment Currently Used at Home glucometer   Part D Coverage Pt has Medicaid   Do you have any problems affording any of your prescribed medications? No   Is the patient taking medications as prescribed? yes   Does the patient have transportation home? Yes   Transportation Anticipated car, drives self;family or friend will provide   Dialysis Name and Scheduled days N/A   Does the patient receive services at the Coumadin Clinic? No   Discharge Plan A Home;Home with family   DME Needed Upon Discharge  none   Patient/Family in Agreement with Plan yes

## 2020-03-15 NOTE — ED NOTES
Assumed care:  Tish Landeros is awake, alert and oriented x 3, skin warm and dry, in NAD with family at bedside.  Patient CO LLQ pain, fever, and N&V that started 4 days ago.  Patient is 2 months pregnant    Patient identifiers for Tish Landeros checked and correct.  LOC:  Tish Landeros is awake, alert, and aware of environment with an appropriate affect. She is oriented x 3 and speaking appropriately.  APPEARANCE:  She is resting comfortably and in no acute distress. She is clean and well groomed, patient's clothing is properly fastened.  SKIN:  The skin is warm and dry. She has normal skin turgor and moist mucus membranes. Skin is intact; no bruising or breakdown noted.  MUSCULOSKELETAL:  She is moving all extremities well, no obvious deformities noted. Pulses intact.   RESPIRATORY:  Airway is open and patent. Respirations are spontaneous and non-labored with normal effort and rate.  CARDIAC:  tachycardia. No peripheral edema noted. Capillary refill < 3 seconds.  ABDOMEN:  No distention noted.  Soft and non-tender upon palpation.  LLQ pain, N&V  NEUROLOGICAL:  PERRL. Facial expression is symmetrical. Hand grasps are equal bilaterally. Normal sensation in all extremities when touched with finger.  Allergies reported:    Review of patient's allergies indicates:   Allergen Reactions    Ciprofloxacin      OTHER NOTES:

## 2020-03-15 NOTE — SUBJECTIVE & OBJECTIVE
Past Medical History:   Diagnosis Date    Abnormal Pap smear of cervix     Diabetes in pregnancy 5/3/2019    Diabetes mellitus     diagnosed 6years ago.     Diabetes mellitus complicating pregnancy 5/26/2019    HPV in female     Pre-existing type 1 diabetes mellitus during pregnancy in third trimester 4/16/2019    Type 1 diabetes mellitus complicating pregnancy, antepartum 1/7/2019       Past Surgical History:   Procedure Laterality Date    CERVICAL BIOPSY  W/ LOOP ELECTRODE EXCISION         Review of patient's allergies indicates:   Allergen Reactions    Ciprofloxacin        No current facility-administered medications on file prior to encounter.      Current Outpatient Medications on File Prior to Encounter   Medication Sig    ibuprofen (ADVIL,MOTRIN) 600 MG tablet Take 1 tablet (600 mg total) by mouth every 6 (six) hours as needed for Pain.    insulin (BASAGLAR KWIKPEN U-100 INSULIN) glargine 100 units/mL (3mL) SubQ pen Inject 18 Units into the skin 2 (two) times daily.    insulin aspart U-100 (NOVOLOG U-100 INSULIN ASPART) 100 unit/mL injection 10 units prn elevated glucose  Maximun 40 units daily (Patient taking differently: Inject 10 Units into the skin 3 (three) times daily as needed for High Blood Sugar. 10 units prn elevated glucose  Maximun 40 units daily)     Family History     Problem Relation (Age of Onset)    Congenital heart disease Paternal Aunt    Diabetes Father, Sister    Heart disease Mother    Hypertension Mother        Tobacco Use    Smoking status: Current Every Day Smoker     Packs/day: 0.50     Years: 10.00     Pack years: 5.00    Smokeless tobacco: Never Used   Substance and Sexual Activity    Alcohol use: Yes     Frequency: Never    Drug use: No    Sexual activity: Yes     Partners: Male     Birth control/protection: None     Review of Systems   Constitutional: Positive for appetite change (decreased) and fever (on and off fever for 5 days). Negative for activity change,  chills, diaphoresis and fatigue.   HENT: Negative for congestion, ear pain, postnasal drip, rhinorrhea and sore throat.    Eyes: Negative for photophobia, pain, redness and visual disturbance.   Respiratory: Negative for cough, chest tightness, shortness of breath and wheezing.    Cardiovascular: Negative for chest pain, palpitations and leg swelling.   Gastrointestinal: Positive for abdominal pain (left lower quadrant) and nausea. Negative for abdominal distention, constipation, diarrhea and vomiting.   Genitourinary: Positive for flank pain (left). Negative for difficulty urinating, dysuria, frequency, hematuria and urgency.   Musculoskeletal: Negative for arthralgias, gait problem, myalgias and neck pain.   Skin: Negative for color change, pallor, rash and wound.   Neurological: Negative for dizziness, syncope, weakness, light-headedness, numbness and headaches.   Psychiatric/Behavioral: Negative for agitation, confusion and hallucinations. The patient is not nervous/anxious.      Objective:     Vital Signs (Most Recent):  Temp: 98.7 °F (37.1 °C) (03/14/20 2125)  Pulse: 81 (03/15/20 0302)  Resp: 20 (03/14/20 2125)  BP: 102/60 (03/15/20 0302)  SpO2: 99 % (03/15/20 0302) Vital Signs (24h Range):  Temp:  [98.7 °F (37.1 °C)] 98.7 °F (37.1 °C)  Pulse:  [] 81  Resp:  [20] 20  SpO2:  [96 %-100 %] 99 %  BP: ()/(53-81) 102/60     Weight: 54.4 kg (120 lb)  Body mass index is 21.26 kg/m².    Physical Exam   Constitutional: She is oriented to person, place, and time. She appears well-developed and well-nourished. No distress.   HENT:   Head: Normocephalic and atraumatic.   Right Ear: External ear normal.   Left Ear: External ear normal.   Mouth/Throat: Oropharynx is clear and moist. Dental caries present.   Eyes: Pupils are equal, round, and reactive to light. EOM are normal.   Neck: Normal range of motion. No JVD present.   Cardiovascular: Normal rate, regular rhythm and intact distal pulses.   No murmur  heard.  Pulmonary/Chest: Effort normal and breath sounds normal. No respiratory distress. She has no wheezes.   Lungs CTA bilaterally; currently on room air   Abdominal: Soft. Bowel sounds are normal. She exhibits no distension. There is no tenderness.   Genitourinary: Vagina normal.   Musculoskeletal: Normal range of motion. She exhibits no edema or deformity.   Neurological: She is alert and oriented to person, place, and time. No sensory deficit. Coordination normal.   Skin: Skin is warm and dry. Capillary refill takes less than 2 seconds. She is not diaphoretic.   Psychiatric: She has a normal mood and affect.   Nursing note and vitals reviewed.        CRANIAL NERVES     CN III, IV, VI   Pupils are equal, round, and reactive to light.  Extraocular motions are normal.        Significant Labs:   BMP:   Recent Labs   Lab 03/14/20  2219   *   *   K 3.7      CO2 15*   BUN 7   CREATININE 0.9   CALCIUM 8.5*     CMP:   Recent Labs   Lab 03/14/20  2219   *   K 3.7      CO2 15*   *   BUN 7   CREATININE 0.9   CALCIUM 8.5*   PROT 6.6   ALBUMIN 2.7*   BILITOT 0.3   ALKPHOS 111   AST 7*   ALT 9*   ANIONGAP 16   EGFRNONAA >60     Lactic Acid:   Recent Labs   Lab 03/15/20  0011   LACTATE 0.8     Urine Studies:   Recent Labs   Lab 03/14/20  2141   COLORU Yellow   APPEARANCEUA Clear   PHUR 6.0   SPECGRAV 1.020   PROTEINUA Negative   GLUCUA 3+*   KETONESU 3+*   BILIRUBINUA 1+*   OCCULTUA 1+*   NITRITE Negative   UROBILINOGEN Negative   LEUKOCYTESUR Negative   RBCUA 5*   WBCUA 13*   BACTERIA Few*   SQUAMEPITHEL 35     All pertinent labs within the past 24 hours have been reviewed.    Significant Imaging: I have reviewed and interpreted all pertinent imaging results/findings within the past 24 hours.

## 2020-03-16 LAB — BACTERIA UR CULT: NORMAL

## 2020-03-24 ENCOUNTER — PATIENT MESSAGE (OUTPATIENT)
Dept: OBSTETRICS AND GYNECOLOGY | Facility: CLINIC | Age: 35
End: 2020-03-24

## 2020-03-25 ENCOUNTER — OFFICE VISIT (OUTPATIENT)
Dept: OBSTETRICS AND GYNECOLOGY | Facility: CLINIC | Age: 35
End: 2020-03-25
Payer: MEDICAID

## 2020-03-25 ENCOUNTER — LAB VISIT (OUTPATIENT)
Dept: LAB | Facility: HOSPITAL | Age: 35
End: 2020-03-25
Attending: SPECIALIST
Payer: MEDICAID

## 2020-03-25 ENCOUNTER — APPOINTMENT (OUTPATIENT)
Dept: LAB | Facility: HOSPITAL | Age: 35
End: 2020-03-25
Attending: SPECIALIST
Payer: MEDICAID

## 2020-03-25 VITALS
WEIGHT: 127.63 LBS | SYSTOLIC BLOOD PRESSURE: 120 MMHG | BODY MASS INDEX: 22.61 KG/M2 | RESPIRATION RATE: 20 BRPM | DIASTOLIC BLOOD PRESSURE: 80 MMHG

## 2020-03-25 DIAGNOSIS — Z12.4 ENCOUNTER FOR PAP SMEAR OF CERVIX WITH HPV DNA COTESTING: ICD-10-CM

## 2020-03-25 DIAGNOSIS — Z34.90 EARLY STAGE OF PREGNANCY: Primary | ICD-10-CM

## 2020-03-25 DIAGNOSIS — Z34.90 EARLY STAGE OF PREGNANCY: ICD-10-CM

## 2020-03-25 LAB
ABO + RH BLD: NORMAL
BLD GP AB SCN CELLS X3 SERPL QL: NORMAL
HCG INTACT+B SERPL-ACNC: NORMAL MIU/ML

## 2020-03-25 PROCEDURE — 88175 CYTOPATH C/V AUTO FLUID REDO: CPT | Performed by: PATHOLOGY

## 2020-03-25 PROCEDURE — 99215 OFFICE O/P EST HI 40 MIN: CPT | Mod: TH,25,S$PBB, | Performed by: SPECIALIST

## 2020-03-25 PROCEDURE — 87624 HPV HI-RISK TYP POOLED RSLT: CPT

## 2020-03-25 PROCEDURE — 86901 BLOOD TYPING SEROLOGIC RH(D): CPT

## 2020-03-25 PROCEDURE — 88141 PR  CYTOPATH CERV/VAG INTERPRET: ICD-10-PCS | Mod: ,,, | Performed by: PATHOLOGY

## 2020-03-25 PROCEDURE — 99999 PR PBB SHADOW E&M-EST. PATIENT-LVL III: ICD-10-PCS | Mod: PBBFAC,,, | Performed by: SPECIALIST

## 2020-03-25 PROCEDURE — 99215 PR OFFICE/OUTPT VISIT, EST, LEVL V, 40-54 MIN: ICD-10-PCS | Mod: TH,25,S$PBB, | Performed by: SPECIALIST

## 2020-03-25 PROCEDURE — 76817 TRANSVAGINAL US OBSTETRIC: CPT | Mod: PBBFAC,PN | Performed by: SPECIALIST

## 2020-03-25 PROCEDURE — 76817 PR US, OB, TRANSVAG APPROACH: ICD-10-PCS | Mod: 26,S$PBB,, | Performed by: SPECIALIST

## 2020-03-25 PROCEDURE — 76817 TRANSVAGINAL US OBSTETRIC: CPT | Mod: 26,S$PBB,, | Performed by: SPECIALIST

## 2020-03-25 PROCEDURE — 99213 OFFICE O/P EST LOW 20 MIN: CPT | Mod: PBBFAC,TH,PN,25 | Performed by: SPECIALIST

## 2020-03-25 PROCEDURE — 84702 CHORIONIC GONADOTROPIN TEST: CPT

## 2020-03-25 PROCEDURE — 36415 COLL VENOUS BLD VENIPUNCTURE: CPT | Mod: PO

## 2020-03-25 PROCEDURE — 99999 PR PBB SHADOW E&M-EST. PATIENT-LVL III: CPT | Mod: PBBFAC,,, | Performed by: SPECIALIST

## 2020-03-25 PROCEDURE — 86850 RBC ANTIBODY SCREEN: CPT

## 2020-03-25 PROCEDURE — 88141 CYTOPATH C/V INTERPRET: CPT | Mod: ,,, | Performed by: PATHOLOGY

## 2020-03-25 NOTE — PROGRESS NOTES
33 yo WF  ab1 , H/O DM presents with positive UPT  LMP approx .  Denies vaginal bleeding, dysuria, hematuria, PP. States was recently treated inpt  For pyleonephritis.  Review of chart reveals fetal pole and FHM on 3/15    Past Medical History:   Diagnosis Date    Abnormal Pap smear of cervix     Diabetes in pregnancy 5/3/2019    Diabetes mellitus     diagnosed 6years ago.     Diabetes mellitus complicating pregnancy 2019    HPV in female     Pre-existing type 1 diabetes mellitus during pregnancy in third trimester 2019    Type 1 diabetes mellitus complicating pregnancy, antepartum 2019       Past Surgical History:   Procedure Laterality Date    CERVICAL BIOPSY  W/ LOOP ELECTRODE EXCISION         Family History   Problem Relation Age of Onset    Diabetes Father         type 1    Hypertension Mother     Heart disease Mother         stent    Congenital heart disease Paternal Aunt          of hole in heart    Diabetes Sister         Juvenile DM    Arrhythmia Neg Hx     Cardiomyopathy Neg Hx     Pacemaker/defibrilator Neg Hx     Heart attacks under age 50 Neg Hx     Breast cancer Neg Hx     Ovarian cancer Neg Hx        Social History     Socioeconomic History    Marital status:      Spouse name: Not on file    Number of children: Not on file    Years of education: Not on file    Highest education level: Not on file   Occupational History    Not on file   Social Needs    Financial resource strain: Not on file    Food insecurity:     Worry: Not on file     Inability: Not on file    Transportation needs:     Medical: Not on file     Non-medical: Not on file   Tobacco Use    Smoking status: Current Every Day Smoker     Packs/day: 0.50     Years: 10.00     Pack years: 5.00    Smokeless tobacco: Never Used   Substance and Sexual Activity    Alcohol use: Yes     Frequency: Never    Drug use: No    Sexual activity: Yes     Partners: Male     Birth  control/protection: None   Lifestyle    Physical activity:     Days per week: Not on file     Minutes per session: Not on file    Stress: Not on file   Relationships    Social connections:     Talks on phone: Not on file     Gets together: Not on file     Attends Gnosticist service: Not on file     Active member of club or organization: Not on file     Attends meetings of clubs or organizations: Not on file     Relationship status: Not on file   Other Topics Concern    Not on file   Social History Narrative    Not on file       Current Outpatient Medications   Medication Sig Dispense Refill    insulin (BASAGLAR KWIKPEN U-100 INSULIN) glargine 100 units/mL (3mL) SubQ pen Inject 18 Units into the skin 2 (two) times daily. 10.8 mL 11    insulin aspart U-100 (NOVOLOG U-100 INSULIN ASPART) 100 unit/mL injection 10 units prn elevated glucose  Maximun 40 units daily (Patient taking differently: Inject 10 Units into the skin 3 (three) times daily as needed for High Blood Sugar. 10 units prn elevated glucose  Maximun 40 units daily) 10 mL 5    ondansetron (ZOFRAN) 4 MG tablet Take 1 tablet (4 mg total) by mouth every 6 (six) hours as needed for Nausea. 20 tablet 0    ibuprofen (ADVIL,MOTRIN) 600 MG tablet Take 1 tablet (600 mg total) by mouth every 6 (six) hours as needed for Pain. (Patient not taking: Reported on 3/25/2020) 20 tablet 3     No current facility-administered medications for this visit.        Review of patient's allergies indicates:   Allergen Reactions    Ciprofloxacin        Review of System:   General: no chills, fever, night sweats, weight gain or weight loss  Psychological: no depression or suicidal ideation  Breasts: no new or changing breast lumps, nipple discharge or masses.  Respiratory: no cough, shortness of breath, or wheezing  Cardiovascular: no chest pain or dyspnea on exertion  Gastrointestinal: no abdominal pain, change in bowel habits, or black or bloody stools  Genito-Urinary: no  incontinence, urinary frequency/urgency or vulvar/vaginal symptoms, pelvic pain or abnormal vaginal bleeding.  Musculoskeletal: no gait disturbance or muscular weakness                                              General Appearance    A and O x 4, Cooperative, no distress   Breasts    Abdomen   Symmetrical, no masses, no discharge, skin changes , erythema or retraction. No adenopathy  Soft, non-tender, bowel sounds active all four quadrants,  no masses, no organomegaly    Genitourinary:   External rectal exam shows no thrombosed external hemorrhoids.   Pelvic exam was performed with patient supine.  No labial fusion.  There is no rash, lesion or injury on the right labia.  There is no rash, lesion or injury on the left labia.  No bleeding and no signs of injury around the vaginal introitus, urethra is without lesions and well supported. The cervix is visualized with no discharge, lesions or friability.  No vaginal discharge found.  No significant Cystocele, Enterocele or rectocele, and uterus well supported.  Bimanual exam:  The urethra is normal to palpation and there are no palpable vaginal wall masses.  Uterus is not deviated, not enlarged, not fixed, normal shape and not tender.  Cervix exhibits no motion tenderness.   Right adnexum displays no mass and no tenderness.  Left adnexum displays no mass and no tenderness.   Extremities: Extremities normal, atraumatic, no cyanosis or edema                       Procedure TVS 6.5Mhz probe  Positive intrauterine IUP  Fetal pole presents and dysmorphic YS  No flicker noted today  No free fluid or extrauterine mass    I discussed lack of fetal heart motion today and will obtain PNP and have pt RTO next week to repeat u/s   Discussed possible loss and bleeding precautions given   Will reassess next week  I reviewed pt's past medical history, past and current meds, family history, allergies and reviewed problem list  I spent 20 min with pt with approx half in consultation

## 2020-03-25 NOTE — PROCEDURES
Procedures     Procedure TVS 6.5Mhz probe  Positive intrauterine IUP  Fetal pole presents and dysmorphic YS  No flicker noted today  No free fluid or extrauterine mass

## 2020-03-26 ENCOUNTER — PATIENT MESSAGE (OUTPATIENT)
Dept: OBSTETRICS AND GYNECOLOGY | Facility: CLINIC | Age: 35
End: 2020-03-26

## 2020-03-30 ENCOUNTER — TELEPHONE (OUTPATIENT)
Dept: OBSTETRICS AND GYNECOLOGY | Facility: CLINIC | Age: 35
End: 2020-03-30

## 2020-03-30 NOTE — TELEPHONE ENCOUNTER
----- Message from Vel Bojorquez sent at 3/30/2020  2:01 PM CDT -----  Contact: pt  Pt is calling to schedule a hospital follow up appt due to have a miscarriage over the weekend, pls call pt back to schedule appt   Call Back#[phone]  Thanks

## 2020-03-30 NOTE — TELEPHONE ENCOUNTER
Patient states had miscarriage this past weekend, has appt scheduled for 4/1/20- advised to keep appt, bleeding precautions given

## 2020-04-01 ENCOUNTER — PATIENT MESSAGE (OUTPATIENT)
Dept: OBSTETRICS AND GYNECOLOGY | Facility: CLINIC | Age: 35
End: 2020-04-01

## 2020-04-01 ENCOUNTER — ROUTINE PRENATAL (OUTPATIENT)
Dept: OBSTETRICS AND GYNECOLOGY | Facility: CLINIC | Age: 35
End: 2020-04-01
Payer: MEDICAID

## 2020-04-01 ENCOUNTER — LAB VISIT (OUTPATIENT)
Dept: LAB | Facility: HOSPITAL | Age: 35
End: 2020-04-01
Attending: SPECIALIST
Payer: MEDICAID

## 2020-04-01 VITALS
WEIGHT: 128.31 LBS | SYSTOLIC BLOOD PRESSURE: 118 MMHG | BODY MASS INDEX: 22.73 KG/M2 | DIASTOLIC BLOOD PRESSURE: 78 MMHG

## 2020-04-01 DIAGNOSIS — O03.9 MISCARRIAGE: ICD-10-CM

## 2020-04-01 DIAGNOSIS — O03.9 MISCARRIAGE: Primary | ICD-10-CM

## 2020-04-01 LAB
HCG INTACT+B SERPL-ACNC: 2497 MIU/ML
HPV HR 12 DNA SPEC QL NAA+PROBE: POSITIVE
HPV16 AG SPEC QL: NEGATIVE
HPV18 DNA SPEC QL NAA+PROBE: NEGATIVE

## 2020-04-01 PROCEDURE — 99214 PR OFFICE/OUTPT VISIT, EST, LEVL IV, 30-39 MIN: ICD-10-PCS | Mod: TH,25,S$PBB, | Performed by: SPECIALIST

## 2020-04-01 PROCEDURE — 99214 OFFICE O/P EST MOD 30 MIN: CPT | Mod: TH,25,S$PBB, | Performed by: SPECIALIST

## 2020-04-01 PROCEDURE — 84702 CHORIONIC GONADOTROPIN TEST: CPT

## 2020-04-01 PROCEDURE — 76817 TRANSVAGINAL US OBSTETRIC: CPT | Mod: 26,S$PBB,, | Performed by: SPECIALIST

## 2020-04-01 PROCEDURE — 76817 PR US, OB, TRANSVAG APPROACH: ICD-10-PCS | Mod: 26,S$PBB,, | Performed by: SPECIALIST

## 2020-04-01 PROCEDURE — 76817 TRANSVAGINAL US OBSTETRIC: CPT | Mod: PBBFAC,PN | Performed by: SPECIALIST

## 2020-04-01 PROCEDURE — 99999 PR PBB SHADOW E&M-EST. PATIENT-LVL III: ICD-10-PCS | Mod: PBBFAC,,, | Performed by: SPECIALIST

## 2020-04-01 PROCEDURE — 99213 OFFICE O/P EST LOW 20 MIN: CPT | Mod: PBBFAC,TH,PN,25 | Performed by: SPECIALIST

## 2020-04-01 PROCEDURE — 36415 COLL VENOUS BLD VENIPUNCTURE: CPT | Mod: PO

## 2020-04-01 PROCEDURE — 99999 PR PBB SHADOW E&M-EST. PATIENT-LVL III: CPT | Mod: PBBFAC,,, | Performed by: SPECIALIST

## 2020-04-01 NOTE — TELEPHONE ENCOUNTER
Pt would like to start depo shot, please advise on wait time or restrictions. When can she have it.

## 2020-04-01 NOTE — PROCEDURES
Procedures       Procedure TVS 6.5Mhz probe  Uterus  Empty, no IUP, NO YS  No free fluid compared to previous u/s revealing intrauterine YS

## 2020-04-01 NOTE — PROGRESS NOTES
Pt returns for follow eval and u/s after dysmorphic YS and report of vaginal bleeding over the weekend. Pt states she passed tissue  Bleeding has abated and denies pain, n/v, f/c, dysuria, hematuria.  VSS  Abd  Soft NT    Procedure TVS 6.5Mhz probe  Uterus  Empty, no IUP, NO YS  No free fluid compared to previous u/s revealing intrauterine YS    I discussed completing ab and will follow [facility] to [place]  Bleeding precautions and pelvic rest    I reviewed pt's past medical history, past and current meds, family history, allergies and reviewed problem list  I spent 20 min with pt with approx half in consultation   Will follow

## 2020-04-06 DIAGNOSIS — O03.9 MISCARRIAGE: Primary | ICD-10-CM

## 2020-04-07 LAB
FINAL PATHOLOGIC DIAGNOSIS: ABNORMAL
Lab: ABNORMAL

## 2020-04-16 ENCOUNTER — OFFICE VISIT (OUTPATIENT)
Dept: OBSTETRICS AND GYNECOLOGY | Facility: CLINIC | Age: 35
End: 2020-04-16
Payer: MEDICAID

## 2020-04-16 VITALS
WEIGHT: 129 LBS | DIASTOLIC BLOOD PRESSURE: 60 MMHG | HEIGHT: 63 IN | BODY MASS INDEX: 22.86 KG/M2 | SYSTOLIC BLOOD PRESSURE: 96 MMHG

## 2020-04-16 DIAGNOSIS — R87.810 ASCUS WITH POSITIVE HIGH RISK HPV CERVICAL: Primary | ICD-10-CM

## 2020-04-16 DIAGNOSIS — R87.610 ASCUS WITH POSITIVE HIGH RISK HPV CERVICAL: Primary | ICD-10-CM

## 2020-04-16 PROCEDURE — 57454 PR COLPOSC,CERVIX W/ADJ VAG,W/BX & CURRETAG: ICD-10-PCS | Mod: S$PBB,,, | Performed by: SPECIALIST

## 2020-04-16 PROCEDURE — 99999 PR PBB SHADOW E&M-EST. PATIENT-LVL III: ICD-10-PCS | Mod: PBBFAC,,, | Performed by: SPECIALIST

## 2020-04-16 PROCEDURE — 57454 BX/CURETT OF CERVIX W/SCOPE: CPT | Mod: S$PBB,,, | Performed by: SPECIALIST

## 2020-04-16 PROCEDURE — 99214 PR OFFICE/OUTPT VISIT, EST, LEVL IV, 30-39 MIN: ICD-10-PCS | Mod: 25,S$PBB,, | Performed by: SPECIALIST

## 2020-04-16 PROCEDURE — 88305 TISSUE EXAM BY PATHOLOGIST: CPT | Mod: 26,,, | Performed by: PATHOLOGY

## 2020-04-16 PROCEDURE — 57454 BX/CURETT OF CERVIX W/SCOPE: CPT | Mod: PBBFAC,PN | Performed by: SPECIALIST

## 2020-04-16 PROCEDURE — 99214 OFFICE O/P EST MOD 30 MIN: CPT | Mod: 25,S$PBB,, | Performed by: SPECIALIST

## 2020-04-16 PROCEDURE — 88305 TISSUE EXAM BY PATHOLOGIST: ICD-10-PCS | Mod: 26,,, | Performed by: PATHOLOGY

## 2020-04-16 PROCEDURE — 99213 OFFICE O/P EST LOW 20 MIN: CPT | Mod: PBBFAC,PN,25 | Performed by: SPECIALIST

## 2020-04-16 PROCEDURE — 88305 TISSUE EXAM BY PATHOLOGIST: CPT | Mod: 59 | Performed by: PATHOLOGY

## 2020-04-16 PROCEDURE — 99999 PR PBB SHADOW E&M-EST. PATIENT-LVL III: CPT | Mod: PBBFAC,,, | Performed by: SPECIALIST

## 2020-04-16 NOTE — PROGRESS NOTES
35 yo WF presents for recommended colposcopic exam following ASCUS with pos HPV result  Past Medical History:   Diagnosis Date    Abnormal Pap smear of cervix     Diabetes in pregnancy 5/3/2019    Diabetes mellitus     diagnosed 6years ago.     Diabetes mellitus complicating pregnancy 2019    HPV in female     Pre-existing type 1 diabetes mellitus during pregnancy in third trimester 2019    Type 1 diabetes mellitus complicating pregnancy, antepartum 2019       Past Surgical History:   Procedure Laterality Date    CERVICAL BIOPSY  W/ LOOP ELECTRODE EXCISION         Family History   Problem Relation Age of Onset    Diabetes Father         type 1    Hypertension Mother     Heart disease Mother         stent    Congenital heart disease Paternal Aunt          of hole in heart    Diabetes Sister         Juvenile DM    Arrhythmia Neg Hx     Cardiomyopathy Neg Hx     Pacemaker/defibrilator Neg Hx     Heart attacks under age 50 Neg Hx     Breast cancer Neg Hx     Ovarian cancer Neg Hx        Social History     Socioeconomic History    Marital status:      Spouse name: Not on file    Number of children: Not on file    Years of education: Not on file    Highest education level: Not on file   Occupational History    Not on file   Social Needs    Financial resource strain: Not on file    Food insecurity:     Worry: Not on file     Inability: Not on file    Transportation needs:     Medical: Not on file     Non-medical: Not on file   Tobacco Use    Smoking status: Current Every Day Smoker     Packs/day: 0.50     Years: 10.00     Pack years: 5.00    Smokeless tobacco: Never Used   Substance and Sexual Activity    Alcohol use: Yes     Frequency: Never    Drug use: No    Sexual activity: Yes     Partners: Male     Birth control/protection: None   Lifestyle    Physical activity:     Days per week: Not on file     Minutes per session: Not on file    Stress: Not on file    Relationships    Social connections:     Talks on phone: Not on file     Gets together: Not on file     Attends Faith service: Not on file     Active member of club or organization: Not on file     Attends meetings of clubs or organizations: Not on file     Relationship status: Not on file   Other Topics Concern    Not on file   Social History Narrative    Not on file       Current Outpatient Medications   Medication Sig Dispense Refill    ibuprofen (ADVIL,MOTRIN) 600 MG tablet Take 1 tablet (600 mg total) by mouth every 6 (six) hours as needed for Pain. 20 tablet 3    insulin (BASAGLAR KWIKPEN U-100 INSULIN) glargine 100 units/mL (3mL) SubQ pen Inject 18 Units into the skin 2 (two) times daily. 10.8 mL 11    insulin aspart U-100 (NOVOLOG U-100 INSULIN ASPART) 100 unit/mL injection 10 units prn elevated glucose  Maximun 40 units daily (Patient taking differently: Inject 10 Units into the skin 3 (three) times daily as needed for High Blood Sugar. 10 units prn elevated glucose  Maximun 40 units daily) 10 mL 5    ondansetron (ZOFRAN) 4 MG tablet Take 1 tablet (4 mg total) by mouth every 6 (six) hours as needed for Nausea. 20 tablet 0     No current facility-administered medications for this visit.        Review of patient's allergies indicates:   Allergen Reactions    Ciprofloxacin        Review of System:   General: no chills, fever, night sweats, weight gain or weight loss  Psychological: no depression or suicidal ideation  Breasts: no new or changing breast lumps, nipple discharge or masses.  Respiratory: no cough, shortness of breath, or wheezing  Cardiovascular: no chest pain or dyspnea on exertion  Gastrointestinal: no abdominal pain, change in bowel habits, or black or bloody stools  Genito-Urinary: no incontinence, urinary frequency/urgency or vulvar/vaginal symptoms, pelvic pain or abnormal vaginal bleeding.  Musculoskeletal: no gait disturbance or muscular weakness      PRE-COLPOSCOPY PROCEDURE  COUNSELING:  Discussed the abnormal pap test findings, HPV, need for colposcopy and possible biopsies to determine a diagnosis and plan of care, treatments available, the minimal risks of bleeding and infection with a colposcopy, alternatives to colposcopy and she agrees to proceed.    Transformation zone---visualized  cervical lesion present----6 oclock ectocervix  aceto white-----6 oclock   punctation-------no  cobblestone------no    Biopsy position----6 oclock ectocervix  ECC------submitted    Specimens placed in formalin and sent to pathology. Monsel's solution was applied at biopsy sites to stop bleeding. The speculum was removed.    POST COLPOSCOPY COUNSELING:   Manage post colposcopy cramping with NSAIDs, Tylenol or Rx per [employer].  Avoid anything in vagina (intercourse, douching, tampons) one week after the procedure.  Expect a clumpy blackish vaginal discharge (Monsel's solution) for several days; Report bleeding heavier than a period, worsening pain, fever > 101.0 F, or foul-smelling vaginal discharge; Stressed importance of follow-up.    Counseling lasted approximately 15 minutes and all her questions were answered.  At the end of patient visit, nurse and MD both asked pt if any improvements needed in visit experience and asked whether any further pt questions or concerns.

## 2020-04-16 NOTE — PROCEDURES
Procedures       PRE-COLPOSCOPY PROCEDURE COUNSELING:  Discussed the abnormal pap test findings, HPV, need for colposcopy and possible biopsies to determine a diagnosis and plan of care, treatments available, the minimal risks of bleeding and infection with a colposcopy, alternatives to colposcopy and she agrees to proceed.    Transformation zone---visualized  cervical lesion present----6 oclock ectocervix  aceto white-----6 oclock   punctation-------no  cobblestone------no    Biopsy position----6 oclock ectocervix  ECC------submitted    Specimens placed in formalin and sent to pathology. Monsel's solution was applied at biopsy sites to stop bleeding. The speculum was removed.    POST COLPOSCOPY COUNSELING:   Manage post colposcopy cramping with NSAIDs, Tylenol or Rx per [employer].  Avoid anything in vagina (intercourse, douching, tampons) one week after the procedure.  Expect a clumpy blackish vaginal discharge (Monsel's solution) for several days; Report bleeding heavier than a period, worsening pain, fever > 101.0 F, or foul-smelling vaginal discharge; Stressed importance of follow-up.

## 2020-04-20 LAB
FINAL PATHOLOGIC DIAGNOSIS: NORMAL
GROSS: NORMAL

## 2020-04-26 NOTE — SUBJECTIVE & OBJECTIVE
Interval History: patient seen and examined. No acute events.     Review of Systems   Constitutional: Positive for fatigue. Negative for activity change, appetite change and fever.   HENT: Negative.    Eyes: Negative.    Respiratory: Negative for chest tightness, shortness of breath and wheezing.    Cardiovascular: Negative for chest pain, palpitations and leg swelling.   Gastrointestinal: Negative for abdominal distention, abdominal pain, blood in stool, diarrhea and vomiting.   Genitourinary: Negative for dysuria and hematuria.   Neurological: Negative for headaches.   Hematological: Negative for adenopathy.   Psychiatric/Behavioral: Negative for confusion.     Objective:     Vital Signs (Most Recent):  Temp: 98.4 °F (36.9 °C) (01/22/20 1539)  Pulse: 68 (01/22/20 1539)  Resp: 18 (01/22/20 1539)  BP: 132/88 (01/22/20 1539)  SpO2: 96 % (01/22/20 1539) Vital Signs (24h Range):        Weight: 87.9 kg (193 lb 12.5 oz)  Body mass index is 34.33 kg/m².  No intake or output data in the 24 hours ending 04/26/20 1436   Physical Exam   Constitutional: She is oriented to person, place, and time. She appears well-developed and well-nourished. No distress.   HENT:   Head: Normocephalic and atraumatic.   Eyes: Pupils are equal, round, and reactive to light.   Neck: Neck supple. No thyromegaly present.   Cardiovascular: Normal rate and regular rhythm. Exam reveals no gallop and no friction rub.   No murmur heard.  Pulmonary/Chest: Effort normal and breath sounds normal. No respiratory distress. She has no wheezes.   Abdominal: Soft. Bowel sounds are normal. She exhibits no distension. There is no tenderness. There is no guarding.   Musculoskeletal: Normal range of motion. She exhibits no edema.   Neurological: She is alert and oriented to person, place, and time.   Skin: Skin is warm and dry. No erythema.   Psychiatric: She has a normal mood and affect.       Labs and imaging reviewed.

## 2020-04-26 NOTE — ASSESSMENT & PLAN NOTE
Tish Landeros is a 34 y.o. female who presents to the Emergency Department with sepsis and acute left pyelonephritis  This patient does have evidence of infective focus  My overall impression is sepsis with left acute pyelonephritis  Lab Results   Component Value Date    WBC 13.47 (H) 03/15/2020    HGB 10.7 (L) 03/15/2020    HCT 33.9 (L) 03/15/2020    MCV 97 03/15/2020     03/15/2020    Trend lactate level which is 2.5 now.  Aggressive IV fluid hydration initiated in the ER.  Continue IV Rocephin 1 g daily.  Follow microbiology results.  Vital signs post fluid administrations were-    Temp Readings from Last 1 Encounters:   03/15/20 97.9 °F (36.6 °C) (Oral)     BP Readings from Last 1 Encounters:   04/16/20 96/60     Pulse Readings from Last 1 Encounters:   03/15/20 93

## 2020-04-26 NOTE — PROGRESS NOTES
[facility] Hospital Medicine  Progress Note    Patient Name: Tish Landeros  MRN: [id number]  Patient Class: IP- Inpatient   Admission Date: 1/17/2020  Length of Stay: 5 days  Attending Physician: No att. providers found  Primary Care Provider: Primary Doctor No        Subjective:     Principal Problem:Sepsis        HPI:  Patient is a 34-year-old type 1 diabetic who is being admitted to Hospital Medicine from [facility] Emergency Room under observation status with complaint of epigastric pain associated with nausea and vomiting for 1 day.  Patient reports associated fever with diaphoresis, dizziness, headache and left flank discomfort.  Patient denies any hematuria or pyuria.  Patient denies any history of nephrolithiasis.  Patient reports higher than usual blood sugars lately.  Patient is prior history of diabetic ketoacidosis.  No urinary complaints reported.  Patient denies any hematemesis or melena.  No sick contact or recent travel history reported.        Overview/Hospital Course:  No notes on file    Interval History: patient seen and examined. No acute events.     Review of Systems   Constitutional: Positive for fatigue. Negative for activity change, appetite change and fever.   HENT: Negative.    Eyes: Negative.    Respiratory: Negative for chest tightness, shortness of breath and wheezing.    Cardiovascular: Negative for chest pain, palpitations and leg swelling.   Gastrointestinal: Negative for abdominal distention, abdominal pain, blood in stool, diarrhea and vomiting.   Genitourinary: Negative for dysuria and hematuria.   Neurological: Negative for headaches.   Hematological: Negative for adenopathy.   Psychiatric/Behavioral: Negative for confusion.     Objective:     Vital Signs (Most Recent):  Temp: 98.4 °F (36.9 °C) (01/22/20 1539)  Pulse: 68 (01/22/20 1539)  Resp: 18 (01/22/20 1539)  BP: 132/88 (01/22/20 1539)  SpO2: 96 % (01/22/20 1539) Vital Signs  (24h Range):        Weight: 87.9 kg (193 lb 12.5 oz)  Body mass index is 34.33 kg/m².  No intake or output data in the 24 hours ending 04/26/20 1436   Physical Exam   Constitutional: She is oriented to person, place, and time. She appears well-developed and well-nourished. No distress.   HENT:   Head: Normocephalic and atraumatic.   Eyes: Pupils are equal, round, and reactive to light.   Neck: Neck supple. No thyromegaly present.   Cardiovascular: Normal rate and regular rhythm. Exam reveals no gallop and no friction rub.   No murmur heard.  Pulmonary/Chest: Effort normal and breath sounds normal. No respiratory distress. She has no wheezes.   Abdominal: Soft. Bowel sounds are normal. She exhibits no distension. There is no tenderness. There is no guarding.   Musculoskeletal: Normal range of motion. She exhibits no edema.   Neurological: She is alert and oriented to person, place, and time.   Skin: Skin is warm and dry. No erythema.   Psychiatric: She has a normal mood and affect.       Labs and imaging reviewed.       Assessment/Plan:      * Sepsis  Tish Landeros is a 34 y.o. female who presents to the Emergency Department with sepsis and acute left pyelonephritis  This patient does have evidence of infective focus  My overall impression is sepsis with left acute pyelonephritis  Lab Results   Component Value Date    WBC 13.47 (H) 03/15/2020    HGB 10.7 (L) 03/15/2020    HCT 33.9 (L) 03/15/2020    MCV 97 03/15/2020     03/15/2020    Trend lactate level which is 2.5 now.  Aggressive IV fluid hydration initiated in the ER.  Continue IV Rocephin 1 g daily.  Follow microbiology results.  Vital signs post fluid administrations were-    Temp Readings from Last 1 Encounters:   03/15/20 97.9 °F (36.6 °C) (Oral)     BP Readings from Last 1 Encounters:   04/16/20 96/60     Pulse Readings from Last 1 Encounters:   03/15/20 93           Right upper quadrant abdominal pain  Right upper quadrant abdominal ultrasound  results reviewed with the patient's suggesting acalculous cholecystitis.  Obtain HIDA scan and consult general surgeon.  Maintain NPO and continue supportive care with IV fluid hydration, antiemetics and intravenous narcotics as needed.  Continue proton pump inhibitor.      Pyelonephritis  Follow microbiology results.  Continue IV Rocephin 1 g daily.      Pre-existing type 1 diabetes mellitus with hyperglycemia during pregnancy in first trimester  - start basal insulin @20 BID  - Stop drip at noon  - Continue mealtime dosing  - Ok for diet        VTE Risk Mitigation (From admission, onward)         Ordered     IP VTE LOW RISK PATIENT  Once      01/17/20 1841                      Logan Simons MD  Department of Hospital Medicine   [facility]

## 2020-05-05 ENCOUNTER — HOSPITAL ENCOUNTER (INPATIENT)
Facility: HOSPITAL | Age: 35
LOS: 1 days | Discharge: HOME | DRG: 638 | End: 2020-05-06
Attending: EMERGENCY MEDICINE | Admitting: HOSPITALIST
Payer: MEDICAID

## 2020-05-05 DIAGNOSIS — E10.10 DIABETIC KETOACIDOSIS WITHOUT COMA ASSOCIATED WITH TYPE 1 DIABETES MELLITUS: Primary | ICD-10-CM

## 2020-05-05 DIAGNOSIS — E10.65 TYPE 1 DIABETES MELLITUS WITH HYPERGLYCEMIA: ICD-10-CM

## 2020-05-05 DIAGNOSIS — R00.0 TACHYCARDIA: ICD-10-CM

## 2020-05-05 DIAGNOSIS — N17.9 AKI (ACUTE KIDNEY INJURY): ICD-10-CM

## 2020-05-05 DIAGNOSIS — D72.829 LEUKOCYTOSIS: ICD-10-CM

## 2020-05-05 PROBLEM — K52.9 GASTROENTERITIS: Status: ACTIVE | Noted: 2020-05-05

## 2020-05-05 LAB
ALBUMIN SERPL BCP-MCNC: 5 G/DL (ref 3.5–5.2)
ALLENS TEST: ABNORMAL
ALP SERPL-CCNC: 117 U/L (ref 55–135)
ALT SERPL W/O P-5'-P-CCNC: 19 U/L (ref 10–44)
ANION GAP SERPL CALC-SCNC: 11 MMOL/L (ref 8–16)
ANION GAP SERPL CALC-SCNC: 26 MMOL/L (ref 8–16)
AST SERPL-CCNC: 20 U/L (ref 10–40)
B-HCG UR QL: NEGATIVE
B-OH-BUTYR BLD STRIP-SCNC: 2.1 MMOL/L (ref 0–0.5)
BACTERIA #/AREA URNS HPF: NORMAL /HPF
BASOPHILS NFR BLD: 0 % (ref 0–1.9)
BILIRUB SERPL-MCNC: 0.5 MG/DL (ref 0.1–1)
BILIRUB UR QL STRIP: ABNORMAL
BUN SERPL-MCNC: 11 MG/DL (ref 6–20)
BUN SERPL-MCNC: 18 MG/DL (ref 6–20)
CALCIUM SERPL-MCNC: 10.4 MG/DL (ref 8.7–10.5)
CALCIUM SERPL-MCNC: 8.6 MG/DL (ref 8.7–10.5)
CHLORIDE SERPL-SCNC: 102 MMOL/L (ref 95–110)
CHLORIDE SERPL-SCNC: 106 MMOL/L (ref 95–110)
CLARITY UR: CLEAR
CO2 SERPL-SCNC: 10 MMOL/L (ref 23–29)
CO2 SERPL-SCNC: 20 MMOL/L (ref 23–29)
COLOR UR: YELLOW
CREAT SERPL-MCNC: 1 MG/DL (ref 0.5–1.4)
CREAT SERPL-MCNC: 1.7 MG/DL (ref 0.5–1.4)
CTP QC/QA: YES
DELSYS: ABNORMAL
DIFFERENTIAL METHOD: ABNORMAL
EOSINOPHIL NFR BLD: 0 % (ref 0–8)
ERYTHROCYTE [DISTWIDTH] IN BLOOD BY AUTOMATED COUNT: 12.7 % (ref 11.5–14.5)
EST. GFR  (AFRICAN AMERICAN): 44 ML/MIN/1.73 M^2
EST. GFR  (AFRICAN AMERICAN): >60 ML/MIN/1.73 M^2
EST. GFR  (NON AFRICAN AMERICAN): 39 ML/MIN/1.73 M^2
EST. GFR  (NON AFRICAN AMERICAN): >60 ML/MIN/1.73 M^2
FIO2: 21
GLUCOSE SERPL-MCNC: 201 MG/DL (ref 70–110)
GLUCOSE SERPL-MCNC: 440 MG/DL (ref 70–110)
GLUCOSE UR QL STRIP: ABNORMAL
HCO3 UR-SCNC: 11.5 MMOL/L (ref 24–28)
HCT VFR BLD AUTO: 48.6 % (ref 37–48.5)
HGB BLD-MCNC: 16.2 G/DL (ref 12–16)
HGB UR QL STRIP: NEGATIVE
IMM GRANULOCYTES # BLD AUTO: ABNORMAL K/UL
IMM GRANULOCYTES NFR BLD AUTO: ABNORMAL %
KETONES UR QL STRIP: ABNORMAL
LACTATE SERPL-SCNC: 4.2 MMOL/L (ref 0.5–2.2)
LEUKOCYTE ESTERASE UR QL STRIP: NEGATIVE
LYMPHOCYTES NFR BLD: 4 % (ref 18–48)
MAGNESIUM SERPL-MCNC: 1.9 MG/DL (ref 1.6–2.6)
MCH RBC QN AUTO: 31.4 PG (ref 27–31)
MCHC RBC AUTO-ENTMCNC: 33.3 G/DL (ref 32–36)
MCV RBC AUTO: 94 FL (ref 82–98)
MICROSCOPIC COMMENT: NORMAL
MODE: ABNORMAL
MONOCYTES NFR BLD: 3 % (ref 4–15)
NEUTROPHILS NFR BLD: 86 % (ref 38–73)
NEUTS BAND NFR BLD MANUAL: 7 %
NITRITE UR QL STRIP: NEGATIVE
NRBC BLD-RTO: 0 /100 WBC
PCO2 BLDA: 26.8 MMHG (ref 35–45)
PH SMN: 7.24 [PH] (ref 7.35–7.45)
PH UR STRIP: 5 [PH] (ref 5–8)
PHOSPHATE SERPL-MCNC: 3.6 MG/DL (ref 2.7–4.5)
PLATELET # BLD AUTO: 264 K/UL (ref 150–350)
PLATELET BLD QL SMEAR: ABNORMAL
PMV BLD AUTO: 11.2 FL (ref 9.2–12.9)
PO2 BLDA: 71 MMHG (ref 40–60)
POC BE: -16 MMOL/L
POC SATURATED O2: 91 % (ref 95–100)
POC TCO2: 12 MMOL/L (ref 24–29)
POCT GLUCOSE: 144 MG/DL (ref 70–110)
POCT GLUCOSE: 184 MG/DL (ref 70–110)
POCT GLUCOSE: 202 MG/DL (ref 70–110)
POCT GLUCOSE: 209 MG/DL (ref 70–110)
POCT GLUCOSE: 215 MG/DL (ref 70–110)
POCT GLUCOSE: 396 MG/DL (ref 70–110)
POCT GLUCOSE: 95 MG/DL (ref 70–110)
POCT GLUCOSE: 98 MG/DL (ref 70–110)
POTASSIUM SERPL-SCNC: 4.2 MMOL/L (ref 3.5–5.1)
POTASSIUM SERPL-SCNC: 4.3 MMOL/L (ref 3.5–5.1)
PROT SERPL-MCNC: 9.3 G/DL (ref 6–8.4)
PROT UR QL STRIP: NEGATIVE
RBC # BLD AUTO: 5.16 M/UL (ref 4–5.4)
SAMPLE: ABNORMAL
SARS-COV-2 RDRP RESP QL NAA+PROBE: NEGATIVE
SITE: ABNORMAL
SODIUM SERPL-SCNC: 137 MMOL/L (ref 136–145)
SODIUM SERPL-SCNC: 138 MMOL/L (ref 136–145)
SP GR UR STRIP: 1.02 (ref 1–1.03)
SQUAMOUS #/AREA URNS HPF: 4 /HPF
URN SPEC COLLECT METH UR: ABNORMAL
UROBILINOGEN UR STRIP-ACNC: NEGATIVE EU/DL
WBC # BLD AUTO: 18.25 K/UL (ref 3.9–12.7)
WBC #/AREA URNS HPF: 3 /HPF (ref 0–5)
YEAST URNS QL MICRO: NORMAL

## 2020-05-05 PROCEDURE — 83036 HEMOGLOBIN GLYCOSYLATED A1C: CPT

## 2020-05-05 PROCEDURE — S5010 5% DEXTROSE AND 0.45% SALINE: HCPCS | Performed by: NURSE PRACTITIONER

## 2020-05-05 PROCEDURE — 83605 ASSAY OF LACTIC ACID: CPT

## 2020-05-05 PROCEDURE — 85027 COMPLETE CBC AUTOMATED: CPT

## 2020-05-05 PROCEDURE — 25000003 PHARM REV CODE 250: Performed by: NURSE PRACTITIONER

## 2020-05-05 PROCEDURE — 80048 BASIC METABOLIC PNL TOTAL CA: CPT

## 2020-05-05 PROCEDURE — 83735 ASSAY OF MAGNESIUM: CPT

## 2020-05-05 PROCEDURE — 63600175 PHARM REV CODE 636 W HCPCS: Performed by: NURSE PRACTITIONER

## 2020-05-05 PROCEDURE — 80053 COMPREHEN METABOLIC PANEL: CPT

## 2020-05-05 PROCEDURE — 93005 ELECTROCARDIOGRAM TRACING: CPT

## 2020-05-05 PROCEDURE — 85007 BL SMEAR W/DIFF WBC COUNT: CPT

## 2020-05-05 PROCEDURE — 36415 COLL VENOUS BLD VENIPUNCTURE: CPT

## 2020-05-05 PROCEDURE — 96374 THER/PROPH/DIAG INJ IV PUSH: CPT

## 2020-05-05 PROCEDURE — 99285 EMERGENCY DEPT VISIT HI MDM: CPT | Mod: 25

## 2020-05-05 PROCEDURE — U0002 COVID-19 LAB TEST NON-CDC: HCPCS

## 2020-05-05 PROCEDURE — 96361 HYDRATE IV INFUSION ADD-ON: CPT

## 2020-05-05 PROCEDURE — 82010 KETONE BODYS QUAN: CPT

## 2020-05-05 PROCEDURE — 81025 URINE PREGNANCY TEST: CPT | Performed by: NURSE PRACTITIONER

## 2020-05-05 PROCEDURE — 81000 URINALYSIS NONAUTO W/SCOPE: CPT

## 2020-05-05 PROCEDURE — 81003 URINALYSIS AUTO W/O SCOPE: CPT

## 2020-05-05 PROCEDURE — 25000003 PHARM REV CODE 250: Performed by: EMERGENCY MEDICINE

## 2020-05-05 PROCEDURE — 63600175 PHARM REV CODE 636 W HCPCS: Performed by: EMERGENCY MEDICINE

## 2020-05-05 PROCEDURE — 96375 TX/PRO/DX INJ NEW DRUG ADDON: CPT

## 2020-05-05 PROCEDURE — 20000000 HC ICU ROOM

## 2020-05-05 PROCEDURE — 36416 COLLJ CAPILLARY BLOOD SPEC: CPT

## 2020-05-05 PROCEDURE — 82962 GLUCOSE BLOOD TEST: CPT

## 2020-05-05 PROCEDURE — 94761 N-INVAS EAR/PLS OXIMETRY MLT: CPT

## 2020-05-05 PROCEDURE — 82803 BLOOD GASES ANY COMBINATION: CPT

## 2020-05-05 PROCEDURE — 84100 ASSAY OF PHOSPHORUS: CPT

## 2020-05-05 RX ORDER — POTASSIUM CHLORIDE 7.45 MG/ML
40 INJECTION INTRAVENOUS
Status: DISCONTINUED | OUTPATIENT
Start: 2020-05-05 | End: 2020-05-06

## 2020-05-05 RX ORDER — DIPHENOXYLATE HYDROCHLORIDE AND ATROPINE SULFATE 2.5; .025 MG/1; MG/1
2 TABLET ORAL
Status: COMPLETED | OUTPATIENT
Start: 2020-05-05 | End: 2020-05-05

## 2020-05-05 RX ORDER — IBUPROFEN 200 MG
1 TABLET ORAL DAILY
Status: DISCONTINUED | OUTPATIENT
Start: 2020-05-06 | End: 2020-05-06 | Stop reason: HOSPADM

## 2020-05-05 RX ORDER — POTASSIUM CHLORIDE 7.45 MG/ML
80 INJECTION INTRAVENOUS
Status: DISCONTINUED | OUTPATIENT
Start: 2020-05-05 | End: 2020-05-06

## 2020-05-05 RX ORDER — POTASSIUM CHLORIDE 20 MEQ/1
40 TABLET, EXTENDED RELEASE ORAL
Status: COMPLETED | OUTPATIENT
Start: 2020-05-05 | End: 2020-05-05

## 2020-05-05 RX ORDER — TALC
6 POWDER (GRAM) TOPICAL NIGHTLY PRN
Status: DISCONTINUED | OUTPATIENT
Start: 2020-05-05 | End: 2020-05-06 | Stop reason: HOSPADM

## 2020-05-05 RX ORDER — METOCLOPRAMIDE HYDROCHLORIDE 5 MG/ML
10 INJECTION INTRAMUSCULAR; INTRAVENOUS
Status: COMPLETED | OUTPATIENT
Start: 2020-05-05 | End: 2020-05-05

## 2020-05-05 RX ORDER — POTASSIUM CHLORIDE 7.45 MG/ML
60 INJECTION INTRAVENOUS
Status: DISCONTINUED | OUTPATIENT
Start: 2020-05-05 | End: 2020-05-06

## 2020-05-05 RX ORDER — ONDANSETRON 2 MG/ML
4 INJECTION INTRAMUSCULAR; INTRAVENOUS EVERY 6 HOURS PRN
Status: DISCONTINUED | OUTPATIENT
Start: 2020-05-05 | End: 2020-05-06 | Stop reason: HOSPADM

## 2020-05-05 RX ORDER — DEXTROSE MONOHYDRATE AND SODIUM CHLORIDE 5; .45 G/100ML; G/100ML
INJECTION, SOLUTION INTRAVENOUS CONTINUOUS
Status: DISCONTINUED | OUTPATIENT
Start: 2020-05-05 | End: 2020-05-06

## 2020-05-05 RX ORDER — ACETAMINOPHEN 325 MG/1
650 TABLET ORAL EVERY 4 HOURS PRN
Status: DISCONTINUED | OUTPATIENT
Start: 2020-05-05 | End: 2020-05-06 | Stop reason: HOSPADM

## 2020-05-05 RX ORDER — SODIUM CHLORIDE 0.9 % (FLUSH) 0.9 %
10 SYRINGE (ML) INJECTION
Status: DISCONTINUED | OUTPATIENT
Start: 2020-05-05 | End: 2020-05-06 | Stop reason: HOSPADM

## 2020-05-05 RX ORDER — PANTOPRAZOLE SODIUM 40 MG/1
40 TABLET, DELAYED RELEASE ORAL DAILY
Status: DISCONTINUED | OUTPATIENT
Start: 2020-05-06 | End: 2020-05-06 | Stop reason: HOSPADM

## 2020-05-05 RX ORDER — DEXTROSE MONOHYDRATE AND SODIUM CHLORIDE 5; .45 G/100ML; G/100ML
INJECTION, SOLUTION INTRAVENOUS CONTINUOUS PRN
Status: DISCONTINUED | OUTPATIENT
Start: 2020-05-05 | End: 2020-05-05

## 2020-05-05 RX ORDER — SODIUM CHLORIDE 9 MG/ML
INJECTION, SOLUTION INTRAVENOUS CONTINUOUS
Status: DISCONTINUED | OUTPATIENT
Start: 2020-05-05 | End: 2020-05-05

## 2020-05-05 RX ADMIN — SODIUM CHLORIDE: 0.9 INJECTION, SOLUTION INTRAVENOUS at 05:05

## 2020-05-05 RX ADMIN — DEXTROSE AND SODIUM CHLORIDE: 5; .45 INJECTION, SOLUTION INTRAVENOUS at 06:05

## 2020-05-05 RX ADMIN — SODIUM CHLORIDE, SODIUM LACTATE, POTASSIUM CHLORIDE, AND CALCIUM CHLORIDE 1000 ML: .6; .31; .03; .02 INJECTION, SOLUTION INTRAVENOUS at 02:05

## 2020-05-05 RX ADMIN — POTASSIUM CHLORIDE 40 MEQ: 1500 TABLET, EXTENDED RELEASE ORAL at 04:05

## 2020-05-05 RX ADMIN — DEXTROSE AND SODIUM CHLORIDE: 5; .45 INJECTION, SOLUTION INTRAVENOUS at 10:05

## 2020-05-05 RX ADMIN — Medication 6 MG: at 08:05

## 2020-05-05 RX ADMIN — METOCLOPRAMIDE 10 MG: 5 INJECTION, SOLUTION INTRAMUSCULAR; INTRAVENOUS at 02:05

## 2020-05-05 RX ADMIN — SODIUM CHLORIDE 6 UNITS/HR: 9 INJECTION, SOLUTION INTRAVENOUS at 04:05

## 2020-05-05 RX ADMIN — DIPHENOXYLATE HYDROCHLORIDE AND ATROPINE SULFATE 2 TABLET: 2.5; .025 TABLET ORAL at 02:05

## 2020-05-05 RX ADMIN — SODIUM CHLORIDE 3 UNITS/HR: 9 INJECTION, SOLUTION INTRAVENOUS at 08:05

## 2020-05-05 RX ADMIN — ACETAMINOPHEN 650 MG: 325 TABLET ORAL at 08:05

## 2020-05-05 RX ADMIN — DEXTROSE AND SODIUM CHLORIDE: 5; .45 INJECTION, SOLUTION INTRAVENOUS at 08:05

## 2020-05-05 RX ADMIN — INSULIN HUMAN 6 UNITS: 100 INJECTION, SOLUTION PARENTERAL at 04:05

## 2020-05-05 NOTE — ASSESSMENT & PLAN NOTE
Associated with DKA-likely due to IVVD.  Aggressive IV fluid resuscitation.  Avoid nonessential nephrotoxins.  Dose medication for Estimated Creatinine Clearance: 38.2 mL/min (A) (based on SCr of 1.7 mg/dL (H)).

## 2020-05-05 NOTE — ASSESSMENT & PLAN NOTE
Pt with abrupt onset nausea, vomiting, diarrhea after eating shrimp stir harris.  No recent sick contacts.  No antibiotic exposure.  IV hydration, antiemetics as needed.  Stool cultures, studies.  Benign abdominal exam-no indication for imaging or Abx at this time.

## 2020-05-05 NOTE — ED PROVIDER NOTES
Encounter Date: 2020    SCRIBE #1 NOTE: Karena CANTRELL, jd scribing for, and in the presence of, Juan Whyte MD.       History     Chief Complaint   Patient presents with    Hyperglycemia       Time seen by provider: 2:31 PM on 2020    Tish Landeros is a 34 y.o. female who presents to the ED with complaints of fatigue that started yesterday and N/V/D that started this morning. Patient reports having a recorded blood sugar greater than 600. She denies urinary symptoms and abdominal pain. Denies worsening cough, CP, or SOB. Patient is a smoker. She denies daily alcohol consumption or recreational drug use. Patient denies sick contact. Patient has no other medical concerns or complaints at this moment. She denies fever, chills, congestion, HA, vision changes, or other new symptoms. PMHx includes DM. SHx includes cervical biopsy w/ loop electrode excision.     The history is provided by the patient.     Review of patient's allergies indicates:   Allergen Reactions    Ciprofloxacin      Past Medical History:   Diagnosis Date    Abnormal Pap smear of cervix     Diabetes in pregnancy 5/3/2019    Diabetes mellitus     diagnosed 6years ago.     Diabetes mellitus complicating pregnancy 2019    HPV in female     Pre-existing type 1 diabetes mellitus during pregnancy in third trimester 2019    Type 1 diabetes mellitus complicating pregnancy, antepartum 2019     Past Surgical History:   Procedure Laterality Date    CERVICAL BIOPSY  W/ LOOP ELECTRODE EXCISION       Family History   Problem Relation Age of Onset    Diabetes Father         type 1    Hypertension Mother     Heart disease Mother         stent    Congenital heart disease Paternal Aunt          of hole in heart    Diabetes Sister         Juvenile DM    Arrhythmia Neg Hx     Cardiomyopathy Neg Hx     Pacemaker/defibrilator Neg Hx     Heart attacks under age 50 Neg Hx     Breast cancer Neg Hx     Ovarian  cancer Neg Hx      Social History     Tobacco Use    Smoking status: Current Every Day Smoker     Packs/day: 0.50     Years: 10.00     Pack years: 5.00    Smokeless tobacco: Never Used   Substance Use Topics    Alcohol use: Yes     Frequency: Never    Drug use: No     Review of Systems   Constitutional: Positive for fatigue. Negative for chills and fever.   HENT: Negative for congestion.    Eyes: Negative for visual disturbance.   Respiratory: Negative for cough and shortness of breath.    Cardiovascular: Negative for chest pain.   Gastrointestinal: Positive for diarrhea, nausea and vomiting. Negative for abdominal pain.   Genitourinary: Negative for decreased urine volume, difficulty urinating, dysuria, frequency, hematuria and urgency.   Musculoskeletal: Negative for gait problem and joint swelling.   Skin: Negative for pallor and rash.   Neurological: Negative for headaches.   Hematological: Does not bruise/bleed easily.   Psychiatric/Behavioral: The patient is not nervous/anxious.        Physical Exam     Initial Vitals [05/05/20 1428]   BP Pulse Resp Temp SpO2   113/72 (!) 148 16 98.4 °F (36.9 °C) 99 %      MAP       --         Physical Exam    Nursing note and vitals reviewed.  Constitutional: She appears well-developed and well-nourished.   HENT:   Head: Normocephalic and atraumatic.   Eyes: Conjunctivae are normal.   Neck: Normal range of motion. Neck supple.   Cardiovascular: Regular rhythm and normal heart sounds. Tachycardia present.  Exam reveals no gallop and no friction rub.    No murmur heard.  Pulmonary/Chest: Effort normal and breath sounds normal. No respiratory distress. She has no wheezes. She has no rhonchi. She has no rales.   Abdominal: Soft. She exhibits no distension. There is no tenderness.   Musculoskeletal: Normal range of motion.   Neurological: She is alert and oriented to person, place, and time.   Skin: Skin is warm and dry. No erythema.   Psychiatric: She has a normal mood and  affect.         ED Course   Critical Care  Date/Time: 5/5/2020 3:59 PM  Performed by: Juan Whyte III, MD  Authorized by: Juan Whyte III, MD   Direct patient critical care time: 60 minutes  Total critical care time (exclusive of procedural time) : 60 minutes  Critical care was necessary to treat or prevent imminent or life-threatening deterioration of the following conditions: endocrine crisis and renal failure.  Critical care was time spent personally by me on the following activities: review of old charts, pulse oximetry, ordering and review of laboratory studies, obtaining history from patient or surrogate, evaluation of patient's response to treatment, development of treatment plan with patient or surrogate, discussions with primary provider, examination of patient, ordering and performing treatments and interventions and ordering and review of radiographic studies.  Subsequent provider of critical care: I assumed direction of critical care for this patient from another provider of my specialty.        Labs Reviewed   BETA - HYDROXYBUTYRATE, SERUM - Abnormal; Notable for the following components:       Result Value    Beta-Hydroxybutyrate 2.1 (*)     All other components within normal limits   CBC W/ AUTO DIFFERENTIAL - Abnormal; Notable for the following components:    WBC 18.25 (*)     Hemoglobin 16.2 (*)     Hematocrit 48.6 (*)     Mean Corpuscular Hemoglobin 31.4 (*)     Gran% 86.0 (*)     Lymph% 4.0 (*)     Mono% 3.0 (*)     All other components within normal limits   COMPREHENSIVE METABOLIC PANEL - Abnormal; Notable for the following components:    CO2 10 (*)     Glucose 440 (*)     Creatinine 1.7 (*)     Total Protein 9.3 (*)     Anion Gap 26 (*)     eGFR if  44 (*)     eGFR if non  39 (*)     All other components within normal limits   URINALYSIS, REFLEX TO URINE CULTURE - Abnormal; Notable for the following components:    Glucose, UA 4+ (*)     Ketones, UA 3+  (*)     Bilirubin (UA) 1+ (*)     All other components within normal limits    Narrative:     Preferred Collection Type->Urine, Clean Catch   POCT GLUCOSE - Abnormal; Notable for the following components:    POCT Glucose 396 (*)     All other components within normal limits   ISTAT PROCEDURE - Abnormal; Notable for the following components:    POC PH 7.240 (*)     POC PCO2 26.8 (*)     POC PO2 71 (*)     POC HCO3 11.5 (*)     POC SATURATED O2 91 (*)     POC TCO2 12 (*)     All other components within normal limits   SARS-COV-2 RNA AMPLIFICATION, QUAL   MAGNESIUM   PHOSPHORUS   URINALYSIS MICROSCOPIC    Narrative:     Preferred Collection Type->Urine, Clean Catch   POCT URINE PREGNANCY     EKG Readings: (Independently Interpreted)   Rhythm: Sinus Tachycardia. Ectopy: No Ectopy. Conduction: Normal. ST Segments: Normal ST Segments. T Waves: Normal. Clinical Impression: Sinus Tachycardia Other Impression: Right atrial enlargement       Imaging Results          X-Ray Chest AP Portable (Final result)  Result time 05/05/20 16:06:09    Final result by Kobe Lyman MD (05/05/20 16:06:09)                 Impression:      Negative chest.      Electronically signed by: Kobe Lyman MD  Date:    05/05/2020  Time:    16:06             Narrative:    EXAMINATION:  XR CHEST AP PORTABLE    CLINICAL HISTORY:  Elevated white blood cell count, unspecified    TECHNIQUE:  Single frontal view of the chest was performed.    COMPARISON:  None    FINDINGS:  The cardiomediastinal silhouette is within normal limits.  The lungs are well expanded without consolidation or pleural effusion.                                 Medical Decision Making:   History:   Old Medical Records: I decided to obtain old medical records.  Independently Interpreted Test(s):   I have ordered and independently interpreted EKG Reading(s) - see prior notes  Clinical Tests:   Lab Tests: Reviewed and Ordered  Medical Tests: Reviewed and Ordered  ED  Management:  34-year-old type 1 diabetic presents with a 1 day history of nausea, vomiting, diarrhea and diffuse crampy abdominal pain.  She is found in DKA with a KI.  She is initiated on an insulin infusion and given 2 L of lactated Ringer's.  Other:   I have discussed this case with another health care provider.       APC / Resident Notes:   I, Dr. Juan Whyte III, personally performed the services described in this documentation. All medical record entries made by the scribe were at my direction and in my presence.  I have reviewed the chart and agree that the record reflects my personal performance and is accurate and complete       Scribe Attestation:   Scribe #1: I performed the above scribed service and the documentation accurately describes the services I performed. I attest to the accuracy of the note.                  Clinical Impression:       ICD-10-CM ICD-9-CM   1. Diabetic ketoacidosis without coma associated with type 1 diabetes mellitus E10.10 250.11   2. Tachycardia R00.0 785.0   3. Leukocytosis D72.829 288.60   4. ISIAH (acute kidney injury) N17.9 584.9         Disposition:   Disposition: Discharged  Condition: Stable     ED Disposition Condition    Admit                           Juan Whyte III, MD  05/05/20 1700

## 2020-05-05 NOTE — CARE UPDATE
After given permission by patient I updated her significant other, Juan Álvarez, on plan of care.  All questions answered.

## 2020-05-05 NOTE — ED NOTES
Assumed care:  Tish Landeros is appears asleep, resp even and unlabored, skin warm and dry, in NAD.

## 2020-05-05 NOTE — ASSESSMENT & PLAN NOTE
Health hazards associated with cigarette smoking were reviewed with patient and cessation was encouraged. Nicotine replacement and counseling options were discussed.  Spent 3 minutes counseling on cessation, patient verbalizes desire to quit smoking.  Nicotine patch ordered.

## 2020-05-05 NOTE — SUBJECTIVE & OBJECTIVE
Past Medical History:   Diagnosis Date    Abnormal Pap smear of cervix     Diabetes in pregnancy 5/3/2019    Diabetes mellitus     diagnosed 6years ago.     Diabetes mellitus complicating pregnancy 5/26/2019    HPV in female     Pre-existing type 1 diabetes mellitus during pregnancy in third trimester 4/16/2019    Type 1 diabetes mellitus complicating pregnancy, antepartum 1/7/2019       Past Surgical History:   Procedure Laterality Date    CERVICAL BIOPSY  W/ LOOP ELECTRODE EXCISION         Review of patient's allergies indicates:   Allergen Reactions    Ciprofloxacin        No current facility-administered medications on file prior to encounter.      Current Outpatient Medications on File Prior to Encounter   Medication Sig    ibuprofen (ADVIL,MOTRIN) 600 MG tablet Take 1 tablet (600 mg total) by mouth every 6 (six) hours as needed for Pain.    insulin (BASAGLAR KWIKPEN U-100 INSULIN) glargine 100 units/mL (3mL) SubQ pen Inject 18 Units into the skin 2 (two) times daily.    insulin aspart U-100 (NOVOLOG U-100 INSULIN ASPART) 100 unit/mL injection 10 units prn elevated glucose  Maximun 40 units daily (Patient taking differently: Inject 10 Units into the skin 3 (three) times daily as needed for High Blood Sugar. 10 units prn elevated glucose  Maximun 40 units daily)    ondansetron (ZOFRAN) 4 MG tablet Take 1 tablet (4 mg total) by mouth every 6 (six) hours as needed for Nausea.     Family History     Problem Relation (Age of Onset)    Congenital heart disease Paternal Aunt    Diabetes Father, Sister    Heart disease Mother    Hypertension Mother        Tobacco Use    Smoking status: Current Every Day Smoker     Packs/day: 1.00     Years: 10.00     Pack years: 10.00    Smokeless tobacco: Never Used   Substance and Sexual Activity    Alcohol use: Yes     Frequency: Never     Comment: very rare    Drug use: No    Sexual activity: Yes     Partners: Male     Birth control/protection: None     Review of  Systems   Constitutional: Positive for chills and fatigue. Negative for activity change, appetite change and fever.   HENT: Negative for congestion, postnasal drip, sore throat and trouble swallowing.    Eyes: Negative for photophobia and visual disturbance.   Respiratory: Negative for cough, chest tightness, shortness of breath and wheezing.    Cardiovascular: Negative for chest pain, palpitations and leg swelling.   Gastrointestinal: Positive for diarrhea, nausea and vomiting. Negative for abdominal distention, abdominal pain and constipation.   Endocrine: Positive for polydipsia and polyuria.   Genitourinary: Positive for frequency. Negative for difficulty urinating, flank pain and hematuria.   Musculoskeletal: Negative for arthralgias, back pain and myalgias.   Skin: Negative for color change.   Neurological: Negative for weakness and headaches.   Psychiatric/Behavioral: Negative for confusion. The patient is not nervous/anxious.      Objective:     Vital Signs (Most Recent):  Temp: 98.4 °F (36.9 °C) (05/05/20 1428)  Pulse: 108 (05/05/20 1531)  Resp: 16 (05/05/20 1428)  BP: 110/71 (05/05/20 1531)  SpO2: 97 % (05/05/20 1531) Vital Signs (24h Range):  Temp:  [98.4 °F (36.9 °C)] 98.4 °F (36.9 °C)  Pulse:  [105-148] 108  Resp:  [16] 16  SpO2:  [95 %-99 %] 97 %  BP: (110-130)/(71-85) 110/71     Weight: 59 kg (130 lb)  Body mass index is 23.03 kg/m².    Physical Exam   Constitutional: She is oriented to person, place, and time. She appears well-developed and well-nourished. No distress.   HENT:   Head: Normocephalic and atraumatic.   Eyes: Pupils are equal, round, and reactive to light. Conjunctivae and EOM are normal.   Neck: Normal range of motion. Neck supple. No thyromegaly present.   Cardiovascular: Regular rhythm, normal heart sounds and intact distal pulses. Tachycardia present.   No murmur heard.  Pulmonary/Chest: Effort normal and breath sounds normal. No respiratory distress. She exhibits no tenderness.    Abdominal: Soft. Bowel sounds are normal. She exhibits no distension. There is no tenderness. There is no guarding.   Musculoskeletal: Normal range of motion. She exhibits no edema.   Neurological: She is alert and oriented to person, place, and time. No cranial nerve deficit.   Skin: Skin is warm and dry. Capillary refill takes less than 2 seconds.   Psychiatric: She has a normal mood and affect. Her behavior is normal. Judgment and thought content normal.         CRANIAL NERVES     CN III, IV, VI   Pupils are equal, round, and reactive to light.  Extraocular motions are normal.        Significant Labs:   CBC:   Recent Labs   Lab 05/05/20  1453   WBC 18.25*   HGB 16.2*   HCT 48.6*        CMP:   Recent Labs   Lab 05/05/20  1453      K 4.2      CO2 10*   *   BUN 18   CREATININE 1.7*   CALCIUM 10.4   PROT 9.3*   ALBUMIN 5.0   BILITOT 0.5   ALKPHOS 117   AST 20   ALT 19   ANIONGAP 26*   EGFRNONAA 39*     Urine Studies:   Recent Labs   Lab 05/05/20  1619   COLORU Yellow   APPEARANCEUA Clear   PHUR 5.0   SPECGRAV 1.025   PROTEINUA Negative   GLUCUA 4+*   KETONESU 3+*   BILIRUBINUA 1+*   OCCULTUA Negative   NITRITE Negative   UROBILINOGEN Negative   LEUKOCYTESUR Negative   WBCUA 3   BACTERIA Rare   SQUAMEPITHEL 4       Significant Imaging:   CXR: No acute process (I reviewed film).

## 2020-05-05 NOTE — H&P
[facility] Medicine  History & Physical    Patient Name: Tish Landeros  MRN: [id number]  Admission Date: 5/5/2020  Attending Physician: Con Varghese MD  Primary Care Provider: Primary Doctor No         Patient information was obtained from patient, past medical records and ER records.     Subjective:     Principal Problem:Diabetic ketoacidosis without coma associated with type 1 diabetes mellitus    Chief Complaint:   Chief Complaint   Patient presents with    Hyperglycemia        HPI: Tish Landeros is a 34-year-old female with PMHx significant for DM1 and tobacco abuse.  Pt presented to the ED with complaint of nausea, vomiting, diarrhea.  Pt reports 1 day Hx of profuse watery diarrhea that began yesterday night after eating a shrimp stir harris.  Associated symptoms include nausea vomiting today.  Pt denies blood in stool or vomit.  She denies any abdominal pain, chest pain, SOB, fever, or urinary complaints.  She denies recent sick contact.  She does report missing 2 days of her basal insulin.  Her COVID test in ED was negative.  She was found to be in DKA and admitted to the service of hospital Medicine for further evaluation and management.  Other pertinent medical Hx as below:    Past Medical History:   Diagnosis Date    Abnormal Pap smear of cervix     Diabetes in pregnancy 5/3/2019    Diabetes mellitus     diagnosed 6years ago.     Diabetes mellitus complicating pregnancy 5/26/2019    HPV in female     Pre-existing type 1 diabetes mellitus during pregnancy in third trimester 4/16/2019    Type 1 diabetes mellitus complicating pregnancy, antepartum 1/7/2019       Past Surgical History:   Procedure Laterality Date    CERVICAL BIOPSY  W/ LOOP ELECTRODE EXCISION         Review of patient's allergies indicates:   Allergen Reactions    Ciprofloxacin        No current facility-administered medications on file prior to encounter.      Current Outpatient Medications on File  Prior to Encounter   Medication Sig    ibuprofen (ADVIL,MOTRIN) 600 MG tablet Take 1 tablet (600 mg total) by mouth every 6 (six) hours as needed for Pain.    insulin (BASAGLAR KWIKPEN U-100 INSULIN) glargine 100 units/mL (3mL) SubQ pen Inject 18 Units into the skin 2 (two) times daily.    insulin aspart U-100 (NOVOLOG U-100 INSULIN ASPART) 100 unit/mL injection 10 units prn elevated glucose  Maximun 40 units daily (Patient taking differently: Inject 10 Units into the skin 3 (three) times daily as needed for High Blood Sugar. 10 units prn elevated glucose  Maximun 40 units daily)    ondansetron (ZOFRAN) 4 MG tablet Take 1 tablet (4 mg total) by mouth every 6 (six) hours as needed for Nausea.     Family History     Problem Relation (Age of Onset)    Congenital heart disease Paternal Aunt    Diabetes Father, Sister    Heart disease Mother    Hypertension Mother        Tobacco Use    Smoking status: Current Every Day Smoker     Packs/day: 1.00     Years: 10.00     Pack years: 10.00    Smokeless tobacco: Never Used   Substance and Sexual Activity    Alcohol use: Yes     Frequency: Never     Comment: very rare    Drug use: No    Sexual activity: Yes     Partners: Male     Birth control/protection: None     Review of Systems   Constitutional: Positive for chills and fatigue. Negative for activity change, appetite change and fever.   HENT: Negative for congestion, postnasal drip, sore throat and trouble swallowing.    Eyes: Negative for photophobia and visual disturbance.   Respiratory: Negative for cough, chest tightness, shortness of breath and wheezing.    Cardiovascular: Negative for chest pain, palpitations and leg swelling.   Gastrointestinal: Positive for diarrhea, nausea and vomiting. Negative for abdominal distention, abdominal pain and constipation.   Endocrine: Positive for polydipsia and polyuria.   Genitourinary: Positive for frequency. Negative for difficulty urinating, flank pain and hematuria.    Musculoskeletal: Negative for arthralgias, back pain and myalgias.   Skin: Negative for color change.   Neurological: Negative for weakness and headaches.   Psychiatric/Behavioral: Negative for confusion. The patient is not nervous/anxious.      Objective:     Vital Signs (Most Recent):  Temp: 98.4 °F (36.9 °C) (05/05/20 1428)  Pulse: 108 (05/05/20 1531)  Resp: 16 (05/05/20 1428)  BP: 110/71 (05/05/20 1531)  SpO2: 97 % (05/05/20 1531) Vital Signs (24h Range):  Temp:  [98.4 °F (36.9 °C)] 98.4 °F (36.9 °C)  Pulse:  [105-148] 108  Resp:  [16] 16  SpO2:  [95 %-99 %] 97 %  BP: (110-130)/(71-85) 110/71     Weight: 59 kg (130 lb)  Body mass index is 23.03 kg/m².    Physical Exam   Constitutional: She is oriented to person, place, and time. She appears well-developed and well-nourished. No distress.   HENT:   Head: Normocephalic and atraumatic.   Eyes: Pupils are equal, round, and reactive to light. Conjunctivae and EOM are normal.   Neck: Normal range of motion. Neck supple. No thyromegaly present.   Cardiovascular: Regular rhythm, normal heart sounds and intact distal pulses. Tachycardia present.   No murmur heard.  Pulmonary/Chest: Effort normal and breath sounds normal. No respiratory distress. She exhibits no tenderness.   Abdominal: Soft. Bowel sounds are normal. She exhibits no distension. There is no tenderness. There is no guarding.   Musculoskeletal: Normal range of motion. She exhibits no edema.   Neurological: She is alert and oriented to person, place, and time. No cranial nerve deficit.   Skin: Skin is warm and dry. Capillary refill takes less than 2 seconds.   Psychiatric: She has a normal mood and affect. Her behavior is normal. Judgment and thought content normal.         CRANIAL NERVES     CN III, IV, VI   Pupils are equal, round, and reactive to light.  Extraocular motions are normal.        Significant Labs:   CBC:   Recent Labs   Lab 05/05/20  1453   WBC 18.25*   HGB 16.2*   HCT 48.6*         CMP:   Recent Labs   Lab 05/05/20  1453      K 4.2      CO2 10*   *   BUN 18   CREATININE 1.7*   CALCIUM 10.4   PROT 9.3*   ALBUMIN 5.0   BILITOT 0.5   ALKPHOS 117   AST 20   ALT 19   ANIONGAP 26*   EGFRNONAA 39*     Urine Studies:   Recent Labs   Lab 05/05/20  1619   COLORU Yellow   APPEARANCEUA Clear   PHUR 5.0   SPECGRAV 1.025   PROTEINUA Negative   GLUCUA 4+*   KETONESU 3+*   BILIRUBINUA 1+*   OCCULTUA Negative   NITRITE Negative   UROBILINOGEN Negative   LEUKOCYTESUR Negative   WBCUA 3   BACTERIA Rare   SQUAMEPITHEL 4       Significant Imaging:   CXR: No acute process (I reviewed film).      Assessment/Plan:     * Diabetic ketoacidosis without coma associated with type 1 diabetes mellitus  2/2 gastroenteritis and noncompliance basal insulin regimen.  Admit to ICU.  DKA pathway  Continuous IV fluid resuscitation  IV insulin infusion-adjust per protocol.  Q.4 hours BMP.  Replete electrolytes as needed.  Clear, sugar free liquids (patient requesting liquids).      Gastroenteritis  Pt with abrupt onset nausea, vomiting, diarrhea after eating shrimp stir harris.  No recent sick contacts.  No antibiotic exposure.  IV hydration, antiemetics as needed.  Stool cultures, studies.  Benign abdominal exam-no indication for imaging or Abx at this time.      ISIAH (acute kidney injury)  Associated with DKA-likely due to IVVD.  Aggressive IV fluid resuscitation.  Avoid nonessential nephrotoxins.  Dose medication for Estimated Creatinine Clearance: 38.2 mL/min (A) (based on SCr of 1.7 mg/dL (H)).        Tobacco abuse  Health hazards associated with cigarette smoking were reviewed with patient and cessation was encouraged. Nicotine replacement and counseling options were discussed.  Spent 3 minutes counseling on cessation, patient verbalizes desire to quit smoking.  Nicotine patch ordered.          VTE Risk Mitigation (From admission, onward)    Low: SCD/TEDs             Maddi Goodwin NP  Department of  Hospital Medicine [facility]

## 2020-05-05 NOTE — ASSESSMENT & PLAN NOTE
2/2 gastroenteritis and noncompliance basal insulin regimen.  Admit to ICU.  DKA pathway  Continuous IV fluid resuscitation  IV insulin infusion-adjust per protocol.  Q.4 hours BMP.  Replete electrolytes as needed.  Clear, sugar free liquids (patient requesting liquids).

## 2020-05-05 NOTE — HPI
Tish Landeros is a 34-year-old female with PMHx significant for DM1 and tobacco abuse.  Pt presented to the ED with complaint of nausea, vomiting, diarrhea.  Pt reports 1 day Hx of profuse watery diarrhea that began yesterday night after eating a shrimp stir harris.  Associated symptoms include nausea vomiting today.  Pt denies blood in stool or vomit.  She denies any abdominal pain, chest pain, SOB, fever, or urinary complaints.  She denies recent sick contact.  She does report missing 2 days of her basal insulin.  Her COVID test in ED was negative.  She was found to be in DKA and admitted to the service of hospital Medicine for further evaluation and management.  Other pertinent medical Hx as below:

## 2020-05-06 VITALS
SYSTOLIC BLOOD PRESSURE: 135 MMHG | OXYGEN SATURATION: 98 % | WEIGHT: 127 LBS | HEIGHT: 63 IN | TEMPERATURE: 99 F | RESPIRATION RATE: 17 BRPM | BODY MASS INDEX: 22.5 KG/M2 | DIASTOLIC BLOOD PRESSURE: 79 MMHG | HEART RATE: 86 BPM

## 2020-05-06 PROBLEM — N17.9 AKI (ACUTE KIDNEY INJURY): Status: RESOLVED | Noted: 2020-05-05 | Resolved: 2020-05-06

## 2020-05-06 PROBLEM — E10.10 DIABETIC KETOACIDOSIS WITHOUT COMA ASSOCIATED WITH TYPE 1 DIABETES MELLITUS: Status: RESOLVED | Noted: 2020-05-05 | Resolved: 2020-05-06

## 2020-05-06 PROBLEM — K52.9 GASTROENTERITIS: Status: RESOLVED | Noted: 2020-05-05 | Resolved: 2020-05-06

## 2020-05-06 PROBLEM — E10.9 TYPE 1 DIABETES: Status: ACTIVE | Noted: 2020-05-06

## 2020-05-06 LAB
ANION GAP SERPL CALC-SCNC: 10 MMOL/L (ref 8–16)
ANION GAP SERPL CALC-SCNC: 10 MMOL/L (ref 8–16)
ANION GAP SERPL CALC-SCNC: 9 MMOL/L (ref 8–16)
BASOPHILS # BLD AUTO: 0.03 K/UL (ref 0–0.2)
BASOPHILS NFR BLD: 0.3 % (ref 0–1.9)
BUN SERPL-MCNC: 10 MG/DL (ref 6–20)
BUN SERPL-MCNC: 7 MG/DL (ref 6–20)
BUN SERPL-MCNC: 8 MG/DL (ref 6–20)
CALCIUM SERPL-MCNC: 8.4 MG/DL (ref 8.7–10.5)
CALCIUM SERPL-MCNC: 8.5 MG/DL (ref 8.7–10.5)
CALCIUM SERPL-MCNC: 8.5 MG/DL (ref 8.7–10.5)
CHLORIDE SERPL-SCNC: 104 MMOL/L (ref 95–110)
CHLORIDE SERPL-SCNC: 105 MMOL/L (ref 95–110)
CHLORIDE SERPL-SCNC: 106 MMOL/L (ref 95–110)
CO2 SERPL-SCNC: 21 MMOL/L (ref 23–29)
CO2 SERPL-SCNC: 22 MMOL/L (ref 23–29)
CO2 SERPL-SCNC: 22 MMOL/L (ref 23–29)
CREAT SERPL-MCNC: 0.8 MG/DL (ref 0.5–1.4)
DIFFERENTIAL METHOD: ABNORMAL
EOSINOPHIL # BLD AUTO: 0.1 K/UL (ref 0–0.5)
EOSINOPHIL NFR BLD: 0.8 % (ref 0–8)
ERYTHROCYTE [DISTWIDTH] IN BLOOD BY AUTOMATED COUNT: 12.7 % (ref 11.5–14.5)
EST. GFR  (AFRICAN AMERICAN): >60 ML/MIN/1.73 M^2
EST. GFR  (NON AFRICAN AMERICAN): >60 ML/MIN/1.73 M^2
ESTIMATED AVG GLUCOSE: 235 MG/DL (ref 68–131)
GLUCOSE SERPL-MCNC: 201 MG/DL (ref 70–110)
GLUCOSE SERPL-MCNC: 205 MG/DL (ref 70–110)
GLUCOSE SERPL-MCNC: 265 MG/DL (ref 70–110)
HBA1C MFR BLD HPLC: 9.8 % (ref 4–5.6)
HCT VFR BLD AUTO: 37.5 % (ref 37–48.5)
HGB BLD-MCNC: 12.4 G/DL (ref 12–16)
IMM GRANULOCYTES # BLD AUTO: 0.05 K/UL (ref 0–0.04)
IMM GRANULOCYTES NFR BLD AUTO: 0.4 % (ref 0–0.5)
LACTATE SERPL-SCNC: 1.8 MMOL/L (ref 0.5–2.2)
LACTATE SERPL-SCNC: 2.3 MMOL/L (ref 0.5–2.2)
LACTATE SERPL-SCNC: 3.7 MMOL/L (ref 0.5–2.2)
LYMPHOCYTES # BLD AUTO: 2.1 K/UL (ref 1–4.8)
LYMPHOCYTES NFR BLD: 19 % (ref 18–48)
MAGNESIUM SERPL-MCNC: 1.5 MG/DL (ref 1.6–2.6)
MCH RBC QN AUTO: 31.3 PG (ref 27–31)
MCHC RBC AUTO-ENTMCNC: 33.1 G/DL (ref 32–36)
MCV RBC AUTO: 95 FL (ref 82–98)
MONOCYTES # BLD AUTO: 1 K/UL (ref 0.3–1)
MONOCYTES NFR BLD: 8.7 % (ref 4–15)
NEUTROPHILS # BLD AUTO: 8 K/UL (ref 1.8–7.7)
NEUTROPHILS NFR BLD: 70.8 % (ref 38–73)
NRBC BLD-RTO: 0 /100 WBC
PHOSPHATE SERPL-MCNC: 2.3 MG/DL (ref 2.7–4.5)
PLATELET # BLD AUTO: 202 K/UL (ref 150–350)
PMV BLD AUTO: 10.5 FL (ref 9.2–12.9)
POCT GLUCOSE: 184 MG/DL (ref 70–110)
POCT GLUCOSE: 201 MG/DL (ref 70–110)
POCT GLUCOSE: 202 MG/DL (ref 70–110)
POCT GLUCOSE: 206 MG/DL (ref 70–110)
POCT GLUCOSE: 220 MG/DL (ref 70–110)
POCT GLUCOSE: 229 MG/DL (ref 70–110)
POCT GLUCOSE: 231 MG/DL (ref 70–110)
POCT GLUCOSE: 238 MG/DL (ref 70–110)
POCT GLUCOSE: 253 MG/DL (ref 70–110)
POCT GLUCOSE: 282 MG/DL (ref 70–110)
POTASSIUM SERPL-SCNC: 4.3 MMOL/L (ref 3.5–5.1)
POTASSIUM SERPL-SCNC: 4.4 MMOL/L (ref 3.5–5.1)
POTASSIUM SERPL-SCNC: 4.7 MMOL/L (ref 3.5–5.1)
RBC # BLD AUTO: 3.96 M/UL (ref 4–5.4)
SODIUM SERPL-SCNC: 135 MMOL/L (ref 136–145)
SODIUM SERPL-SCNC: 137 MMOL/L (ref 136–145)
SODIUM SERPL-SCNC: 137 MMOL/L (ref 136–145)
WBC # BLD AUTO: 11.29 K/UL (ref 3.9–12.7)

## 2020-05-06 PROCEDURE — 84100 ASSAY OF PHOSPHORUS: CPT

## 2020-05-06 PROCEDURE — 97803 MED NUTRITION INDIV SUBSEQ: CPT

## 2020-05-06 PROCEDURE — 25000003 PHARM REV CODE 250: Performed by: HOSPITALIST

## 2020-05-06 PROCEDURE — 25000003 PHARM REV CODE 250: Performed by: NURSE PRACTITIONER

## 2020-05-06 PROCEDURE — 63600175 PHARM REV CODE 636 W HCPCS: Performed by: HOSPITALIST

## 2020-05-06 PROCEDURE — 85025 COMPLETE CBC W/AUTO DIFF WBC: CPT

## 2020-05-06 PROCEDURE — C9399 UNCLASSIFIED DRUGS OR BIOLOG: HCPCS | Performed by: HOSPITALIST

## 2020-05-06 PROCEDURE — 80048 BASIC METABOLIC PNL TOTAL CA: CPT

## 2020-05-06 PROCEDURE — 83605 ASSAY OF LACTIC ACID: CPT

## 2020-05-06 PROCEDURE — 80048 BASIC METABOLIC PNL TOTAL CA: CPT | Mod: 91

## 2020-05-06 PROCEDURE — 83605 ASSAY OF LACTIC ACID: CPT | Mod: 91

## 2020-05-06 PROCEDURE — 36415 COLL VENOUS BLD VENIPUNCTURE: CPT

## 2020-05-06 PROCEDURE — 94760 N-INVAS EAR/PLS OXIMETRY 1: CPT

## 2020-05-06 PROCEDURE — S4991 NICOTINE PATCH NONLEGEND: HCPCS | Performed by: NURSE PRACTITIONER

## 2020-05-06 PROCEDURE — 83735 ASSAY OF MAGNESIUM: CPT

## 2020-05-06 RX ORDER — GLUCAGON 1 MG
1 KIT INJECTION
Status: DISCONTINUED | OUTPATIENT
Start: 2020-05-06 | End: 2020-05-06 | Stop reason: HOSPADM

## 2020-05-06 RX ORDER — PROMETHAZINE HYDROCHLORIDE 25 MG/1
25 TABLET ORAL EVERY 4 HOURS PRN
Qty: 20 TABLET | Refills: 0 | Status: ON HOLD | OUTPATIENT
Start: 2020-05-06 | End: 2021-04-11 | Stop reason: HOSPADM

## 2020-05-06 RX ORDER — LANOLIN ALCOHOL/MO/W.PET/CERES
800 CREAM (GRAM) TOPICAL
Status: DISCONTINUED | OUTPATIENT
Start: 2020-05-06 | End: 2020-05-06 | Stop reason: HOSPADM

## 2020-05-06 RX ORDER — INSULIN ASPART 100 [IU]/ML
0-5 INJECTION, SOLUTION INTRAVENOUS; SUBCUTANEOUS
Status: DISCONTINUED | OUTPATIENT
Start: 2020-05-06 | End: 2020-05-06

## 2020-05-06 RX ORDER — SODIUM,POTASSIUM PHOSPHATES 280-250MG
2 POWDER IN PACKET (EA) ORAL
Status: DISCONTINUED | OUTPATIENT
Start: 2020-05-06 | End: 2020-05-06 | Stop reason: HOSPADM

## 2020-05-06 RX ORDER — POTASSIUM CHLORIDE 20 MEQ/15ML
60 SOLUTION ORAL
Status: DISCONTINUED | OUTPATIENT
Start: 2020-05-06 | End: 2020-05-06 | Stop reason: HOSPADM

## 2020-05-06 RX ORDER — POTASSIUM CHLORIDE 20 MEQ/15ML
40 SOLUTION ORAL
Status: DISCONTINUED | OUTPATIENT
Start: 2020-05-06 | End: 2020-05-06 | Stop reason: HOSPADM

## 2020-05-06 RX ORDER — IBUPROFEN 200 MG
24 TABLET ORAL
Status: DISCONTINUED | OUTPATIENT
Start: 2020-05-06 | End: 2020-05-06 | Stop reason: HOSPADM

## 2020-05-06 RX ORDER — INSULIN ASPART 100 [IU]/ML
1-10 INJECTION, SOLUTION INTRAVENOUS; SUBCUTANEOUS
Status: DISCONTINUED | OUTPATIENT
Start: 2020-05-06 | End: 2020-05-06 | Stop reason: HOSPADM

## 2020-05-06 RX ORDER — IBUPROFEN 200 MG
16 TABLET ORAL
Status: DISCONTINUED | OUTPATIENT
Start: 2020-05-06 | End: 2020-05-06 | Stop reason: HOSPADM

## 2020-05-06 RX ADMIN — Medication 800 MG: at 09:05

## 2020-05-06 RX ADMIN — PANTOPRAZOLE SODIUM 40 MG: 40 TABLET, DELAYED RELEASE ORAL at 08:05

## 2020-05-06 RX ADMIN — INSULIN DETEMIR 18 UNITS: 100 INJECTION, SOLUTION SUBCUTANEOUS at 09:05

## 2020-05-06 RX ADMIN — POTASSIUM & SODIUM PHOSPHATES POWDER PACK 280-160-250 MG 2 PACKET: 280-160-250 PACK at 09:05

## 2020-05-06 RX ADMIN — INSULIN ASPART 6 UNITS: 100 INJECTION, SOLUTION INTRAVENOUS; SUBCUTANEOUS at 11:05

## 2020-05-06 RX ADMIN — NICOTINE 1 PATCH: 14 PATCH, EXTENDED RELEASE TRANSDERMAL at 08:05

## 2020-05-06 NOTE — PLAN OF CARE
Cm completed pt's discharge. Pt has appointment scheduled at [facility].       05/06/20 1418   Final Note   Assessment Type Final Discharge Note   Anticipated Discharge Disposition Home   Hospital Follow Up  Appt(s) scheduled? Yes

## 2020-05-06 NOTE — CONSULTS
Type 1 diabetes.(also her sister and father).  Verbalizes ability to calculate insulin doses.  Goes to [facility] for her diabetes care.  Would like prescription for CGM (continuous glucose monitor). Case management notified : will notify Dr. Varghese.

## 2020-05-06 NOTE — NURSING
Discharge instruction given. Rx for continuous glucose monitoring given. Patient verbalized understanding of all instructions. Awaiting family member for ride home.

## 2020-05-06 NOTE — NURSING
Insulin gtt and IVF discontinued. Detemir given at 0945. Patient updated on plan. Accu checks AC &HS. Patient states that she has to be discharged today d/t not having childcare. Dr Varghese made aware.

## 2020-05-06 NOTE — NURSING
Discharge orders noted. Patient is sitting up in bed eating lunch. If she tolerates lunch w/o n/v then I will discharge home.

## 2020-05-06 NOTE — PLAN OF CARE
Rec'd pt from ED, 1945, pt PATRICIAO, able to transfer to bed well, insulin infusing @ 3, D5, 1/2 NS infusing, plan of care reviewed with pt, skin assessment complete, blood glucose hourly monitoring, bed alarm on, call light in hand, bariatric clear diet, adm prn tylenol for ha, and melatonin for insomnia, pt resting comfortably. Q4 bmp, lactic acid critical, 4.2, notified NP, will recheck in 4 hrs.  Monitoring closely.

## 2020-05-06 NOTE — CONSULTS
Food & Nutrition                                                           Education    Diet Education: Diabetic Diet   Time Spent: 15 Minutes  Learners: Patient      Nutrition Education provided with handouts: Yes      Comments: Patient with DKA, now resolved. Plan for discharge today. Patient says that she normally follows a diabetic diet and knows how many carbs are in most of the foods she eats, but knows she could do better, dislikes checking her blood sugars. For example: when eating out she does not count carbs. Reviewed carb counting with pt, used the teach back method, pt was able to recall correct carb amounts when quizzed. Pt asking for continuous glucose monitor and possibly insulin pump if inc=surance will cover it. Discussed with diabetic educator and case management.      All questions and concerns answered. Dietitian's contact information provided.       Follow-Up: yes    Please Re-consult as needed        Thanks!

## 2020-05-06 NOTE — EICU
eICU Note : New Admission    Called by the [facility]:    Problem: 34 y/F DM1F, active smoker 0.5 PPD for 10 years  With N/V/Diarrhea.  .    Pertinent History and labs reviewed : 35 y/o  F  Presented with N/V/D the patient recorded a blood sugar of 600.  Patient had complaints of fatigue that started yesterday.denies any urinary symptoms any shortness of breath any cough any chest pain or pain in the abdomen.  No history of fever, chills, congestion, headache, visual changes.    Camera Assessment: Pt lying in bed in no distress   Vitals:    05/05/20 1832 05/05/20 1901 05/05/20 1931 05/05/20 2000   BP:  109/67 107/63 122/63   Pulse: (!) 111 98 99 108   Resp:    (!) 34   Temp:    99.6 °F (37.6 °C)   TempSrc:    Oral   SpO2: 95% (!) 94% 96% 98%     Data:  WBC 18.25, hemoglobin 16.2, hematocrit 48.6, 264  Sodium 135, potassium 4.3, chloride 106, CO2 20, anion gap 11, BUN 11, creatinine 1.0, EGFR greater than 60, glucose 211, calcium 8.6 albumin 5.0.  Urine: Glucose :4+,Ketone 3+,bacteria rare  WBC3+  CXR: Negative Chest    Treatment /Intervention given:  1. DKA blood glucose 600.  Precipitated by ? Started on IV insulin GTT, BMP every 4.  Anion gap 11, CO2 20.IV fluids D50. 0.45 NS@125cc/hr  2. DM1 : Was on Insulin at home :Insulin Glargine 18 Unit BID and Humalog 10 TID at home AC and hs   3. ISIAH: BUN/Creat : 10/Creat 0.8  4. PUD, DVT prophylaxis :SCD and PPI           Marialuisa Sutton M.D  eICU Physician

## 2020-05-06 NOTE — PLAN OF CARE
I called the pts SO Von Álvarez at [phone] and did not get an answer and there is not an option to leave a voicemail. I called the pts mother Eric Hickman at [phone] and she was able to complete the assessment. She confirmed the pts home address and stated that the pt lives with her SO Von [phone]. The pts last admit was in March 2020. She denies HH and has a home glucometer. The pt uses [facility] pharmacy and does not have a PCP. The pt does have medicaid insurance coverage. At this time the pt does not have any discharge needs. I updated DUYEN Truong CM and she is going to schedule a follow up appt with [facility]. Crista Macedo LCSW     05/06/20 9080   Discharge Assessment   Assessment Type Discharge Planning Assessment   Confirmed/corrected address and phone number on facesheet? Yes   Assessment information obtained from? Caregiver   Communicated expected length of stay with patient/caregiver no   Prior to hospitilization cognitive status: Alert/Oriented   Prior to hospitalization functional status: Independent   Current cognitive status: Alert/Oriented   Current Functional Status: Independent   Lives With significant other   Able to Return to Prior Arrangements yes   Is patient able to care for self after discharge? Yes   Who are your caregiver(s) and their phone number(s)? Von Álvarez [phone]   Readmission Within the Last 30 Days no previous admission in last 30 days   Patient currently being followed by outpatient case management? No   Patient currently receives any other outside agency services? No   Equipment Currently Used at Home glucometer   Do you have any problems affording any of your prescribed medications? No   Is the patient taking medications as prescribed? yes   Does the patient have transportation home? Yes   Transportation Anticipated family or friend will provide   Does the patient receive services at the Coumadin Clinic? No   Discharge Plan A Home    Discharge Plan B Home with family   DME Needed Upon Discharge  none   Patient/Family in Agreement with Plan yes

## 2020-05-07 NOTE — DISCHARGE SUMMARY
[facility] Hospital Medicine  Discharge Summary      Patient Name: Tish Landeros  MRN: [id number]  Admission Date: 5/5/2020  Hospital Length of Stay: 1 days  Discharge Date and Time: 5/6/2020  1:30 PM  Attending Physician: No att. providers found   Discharging Provider: Con Varghese MD  Primary Care Provider: Primary Doctor Faye      HPI:   Tish Landeros is a 34-year-old female with PMHx significant for DM1 and tobacco abuse.  Pt presented to the ED with complaint of nausea, vomiting, diarrhea.  Pt reports 1 day Hx of profuse watery diarrhea that began yesterday night after eating a shrimp stir harris.  Associated symptoms include nausea vomiting today.  Pt denies blood in stool or vomit.  She denies any abdominal pain, chest pain, SOB, fever, or urinary complaints.  She denies recent sick contact.  She does report missing 2 days of her basal insulin.  Her COVID test in ED was negative.  She was found to be in DKA and admitted to the service of hospital Medicine for further evaluation and management.  Other pertinent medical Hx as below:    * No surgery found *      Hospital Course:   Patient was treated with intravenous insulin and IVF.  The anion gap closed.  The acidosis resolved.  Her glucosae levels became better controlled.  On hospital day 2, she was transitioned from IV insulin to subq.  She ate lunch without vomiting.  She was stable hemodynamically.  She was discharged home.  I gave her a prescription for Continuous Glucose Monitoring system.    Physical Exam   Constitutional: She is oriented to person, place, and time. She appears well-developed and well-nourished. No distress.   HENT:   Head: Normocephalic and atraumatic.   Eyes: Pupils are equal, round, and reactive to light. Conjunctivae and EOM are normal.   Neck: Normal range of motion. Neck supple. No thyromegaly present.   Cardiovascular: Regular rhythm, normal heart sounds and intact distal pulses.  Normal rate.  No  "murmur heard.      Consults:   Consults (From admission, onward)        Status Ordering Provider     Inpatient consult to Diabetes educator  Once     Provider:  (Not yet assigned)    Completed KANG KEITH     Inpatient consult to Registered Dietitian/Nutritionist  Once     Provider:  (Not yet assigned)    Completed KANG KEITH          No new Assessment & Plan notes have been filed under this hospital service since the last note was generated.  Service: Hospital Medicine    Final Active Diagnoses:    Diagnosis Date Noted POA    PRINCIPAL PROBLEM:  Type 1 diabetes [E10.9] 05/06/2020 Yes    Tobacco abuse [Z72.0] 03/15/2020 Yes      Problems Resolved During this Admission:    Diagnosis Date Noted Date Resolved POA    Diabetic ketoacidosis without coma associated with type 1 diabetes mellitus [E10.10] 05/05/2020 05/06/2020 Yes    ISIAH (acute kidney injury) [N17.9] 05/05/2020 05/06/2020 Yes    Gastroenteritis [K52.9] 05/05/2020 05/06/2020 Yes       Discharged Condition: good    Disposition: Home or Self Care    Follow Up:  Follow-up Information     Maryam Wharton Jr, MD On 5/8/2020.    Specialty:  Family Medicine  Why:  hospital f/u on Friday, 5/8/2020 @ 9:00am  Contact information:  [address] [facility] [address]  [phone]                 Patient Instructions:      BLOOD GLUCOSE MONITOR FOR HOME USE   Order Comments: Please dispense a Continuous Glucose Monitoring System.  Any /brand is ok.     Order Specific Question Answer Comments   Height: 5' 3" (1.6 m)    Weight: 57.6 kg (126 lb 15.8 oz)    Length of need (1-99 months): 99      Ambulatory referral/consult to Diabetes Education   Standing Status: Future   Referral Priority: Routine Referral Type: Consultation   Referral Reason: Specialty Services Required   Requested Specialty: Diabetes   Number of Visits Requested: 1 Expiration Date: 05/06/21     Diet diabetic     Activity as tolerated       Significant Diagnostic Studies: "   BMP  Lab Results   Component Value Date     (L) 05/06/2020    K 4.3 05/06/2020     05/06/2020    CO2 22 (L) 05/06/2020    BUN 7 05/06/2020    CREATININE 0.8 05/06/2020    CALCIUM 8.4 (L) 05/06/2020    ANIONGAP 9 05/06/2020    ESTGFRAFRICA >60 05/06/2020    EGFRNONAA >60 05/06/2020     Lab Results   Component Value Date    WBC 11.29 05/06/2020    HGB 12.4 05/06/2020    HCT 37.5 05/06/2020    MCV 95 05/06/2020     05/06/2020           Pending Diagnostic Studies:     None         Medications:  Reconciled Home Medications:      Medication List      START taking these medications    promethazine 25 MG tablet  Commonly known as:  PHENERGAN  Take 1 tablet (25 mg total) by mouth every 4 (four) hours as needed for Nausea.        CHANGE how you take these medications    insulin aspart U-100 100 unit/mL injection  Commonly known as:  NovoLOG U-100 Insulin aspart  10 units prn elevated glucose  Maximun 40 units daily  What changed:    · how much to take  · how to take this  · when to take this  · reasons to take this        CONTINUE taking these medications    ibuprofen 600 MG tablet  Commonly known as:  ADVIL,MOTRIN  Take 1 tablet (600 mg total) by mouth every 6 (six) hours as needed for Pain.     insulin glargine 100 units/mL (3mL) SubQ pen  Commonly known as:  BASAGLAR KWIKPEN U-100 INSULIN  Inject 18 Units into the skin 2 (two) times daily.     ondansetron 4 MG tablet  Commonly known as:  ZOFRAN  Take 1 tablet (4 mg total) by mouth every 6 (six) hours as needed for Nausea.            Indwelling Lines/Drains at time of discharge:   Lines/Drains/Airways     None                 Time spent on the discharge of patient: 25 minutes  Patient was seen and examined on the date of discharge and determined to be suitable for discharge.        Con Varghese MD  Department of Hospital Medicine  [facility]

## 2020-05-07 NOTE — HOSPITAL COURSE
Patient was treated with intravenous insulin and IVF.  The anion gap closed.  The acidosis resolved.  Her glucosae levels became better controlled.  On hospital day 2, she was transitioned from IV insulin to subq.  She ate lunch without vomiting.  She was stable hemodynamically.  She was discharged home.  I gave her a prescription for Continuous Glucose Monitoring system.    Physical Exam   Constitutional: She is oriented to person, place, and time. She appears well-developed and well-nourished. No distress.   HENT:   Head: Normocephalic and atraumatic.   Eyes: Pupils are equal, round, and reactive to light. Conjunctivae and EOM are normal.   Neck: Normal range of motion. Neck supple. No thyromegaly present.   Cardiovascular: Regular rhythm, normal heart sounds and intact distal pulses.  Normal rate.  No murmur heard.

## 2020-09-03 ENCOUNTER — OFFICE VISIT (OUTPATIENT)
Dept: OBSTETRICS AND GYNECOLOGY | Facility: CLINIC | Age: 35
End: 2020-09-03
Payer: MEDICAID

## 2020-09-03 VITALS
HEIGHT: 63 IN | SYSTOLIC BLOOD PRESSURE: 122 MMHG | WEIGHT: 124.75 LBS | BODY MASS INDEX: 22.11 KG/M2 | DIASTOLIC BLOOD PRESSURE: 74 MMHG

## 2020-09-03 DIAGNOSIS — Z3A.01 7 WEEKS GESTATION OF PREGNANCY: ICD-10-CM

## 2020-09-03 DIAGNOSIS — Z32.00 POSSIBLE PREGNANCY: ICD-10-CM

## 2020-09-03 DIAGNOSIS — N91.2 AMENORRHEA: ICD-10-CM

## 2020-09-03 DIAGNOSIS — Z72.0 TOBACCO ABUSE: ICD-10-CM

## 2020-09-03 DIAGNOSIS — O24.019 TYPE 1 DIABETES MELLITUS COMPLICATING PREGNANCY, ANTEPARTUM: ICD-10-CM

## 2020-09-03 DIAGNOSIS — O09.91 HIGH-RISK PREGNANCY IN FIRST TRIMESTER: Primary | ICD-10-CM

## 2020-09-03 PROCEDURE — 99999 PR PBB SHADOW E&M-EST. PATIENT-LVL III: ICD-10-PCS | Mod: PBBFAC,,, | Performed by: OBSTETRICS & GYNECOLOGY

## 2020-09-03 PROCEDURE — 99213 OFFICE O/P EST LOW 20 MIN: CPT | Mod: PBBFAC,TH,PN | Performed by: OBSTETRICS & GYNECOLOGY

## 2020-09-03 PROCEDURE — 99999 PR PBB SHADOW E&M-EST. PATIENT-LVL III: CPT | Mod: PBBFAC,,, | Performed by: OBSTETRICS & GYNECOLOGY

## 2020-09-03 PROCEDURE — 99202 OFFICE O/P NEW SF 15 MIN: CPT | Mod: TH,S$PBB,, | Performed by: OBSTETRICS & GYNECOLOGY

## 2020-09-03 PROCEDURE — 99202 PR OFFICE/OUTPT VISIT, NEW, LEVL II, 15-29 MIN: ICD-10-PCS | Mod: TH,S$PBB,, | Performed by: OBSTETRICS & GYNECOLOGY

## 2020-09-03 RX ORDER — PEN NEEDLE, DIABETIC 31 GX5/16"
NEEDLE, DISPOSABLE MISCELLANEOUS 2 TIMES DAILY
COMMUNITY
Start: 2020-07-26

## 2020-09-03 RX ORDER — INSULIN GLARGINE 100 [IU]/ML
INJECTION, SOLUTION SUBCUTANEOUS
COMMUNITY
End: 2020-10-13

## 2020-09-03 NOTE — PATIENT INSTRUCTIONS
"  Pregnancy: Your First Trimester Changes  The first trimester is a time of rapid development for your baby. Because your baby is growing so quickly, it is important that you start a healthy lifestyle right away. By the end of the first trimester, your baby has formed all of its major body organs and weighs just over an ounce.     Actual size of baby is 1/4"    Month 1 (Weeks 1 to 4)  The placenta (the organ that nourishes your baby) begins to form. The brain, spinal cord, heart, gastrointestinal tract, and lungs begin to develop. Your baby is about 1/4 inch long by the end of the first month.     Actual size of baby is 1"    Month 2 (Weeks 5 to 8)  All of your babys major body organs form. The face, fingers, toes, ears, and eyes appear. By the end of the month, your baby is about 1-inch long.     Actual size of baby is 4"    Month 3 (Weeks 9 to 12)  Your baby can open and close its fists and mouth. The sexual organs begin to form. As the first trimester ends, your baby is about 3-inches long.  Date Last Reviewed: 8/16/2015  © 5156-2455 [employer]. [address] [place] [address]. All rights reserved. This information is not intended as a substitute for professional medical care. Always follow your healthcare professional's instructions.        If You Need Insulin During Pregnancy     The best site for injecting insulin is your abdomen. But you can also inject into an upper arm or thigh. Talk with your health care provider about where to give yourself a shot.   Your body may not be able to make enough insulin to keep your blood sugar under control during pregnancy. If this happens, you may need to take extra insulin. Taking insulin helps control your blood sugar without harming your baby. Insulin is a natural substance and is not addictive. If you did not have diabetes before pregnancy, you will most likely be able to stop taking insulin after your baby is born. In some cases, an oral " antidiabetic agent, such as glyburide, may be used, but insulin is usually the preferred medicine.  Learning to take insulin  Your healthcare provider will prescribe your insulin. You will need to inject it one or more times a day. Insulin is injected into fatty tissue. It does not cross the placenta. That means it does not affect your baby the way taking a pill would. Your healthcare provider will teach you how to give yourself a shot. With practice, youll get comfortable doing it yourself. The best site for injecting insulin is your belly or abdomen. But you can also inject into an upper arm or thigh. Talk with your healthcare provider about where to give yourself a shot. Here are some steps to follow while injecting yourself with insulin:  · Choose an injection site.  · Clean with alcohol if the skin is dirty.  · Pinch a fold of skin. Insert the needle at a 45° to 90° angle. The best angle will depend on your body type, the length of the needle, and the injection site. Your healthcare provider will help you find which angle is best for you.  · Keeping the skin pinched, inject the insulin by pushing the plunger down.  · Release the pinched skin.  · Remove the needle from your skin.  Apply pressure to the site for 5 to 8 seconds if you see blood or insulin leaking from the site after injection. Dont rub the injection area.  Needles and syringes should only be used once. After using, throw them away in a puncture-proof container, or a sharps container. Dont throw needles in your household trash. Talk to your healthcare provider if you have any questions or concerns about taking insulin.  Finding the right dosage for you  Your healthcare provider will work with you to find the right dosage of insulin for you. This may take time. Thats because you need to balance your insulin with your food and exercise. Your bodys need for insulin also increases as your baby grows. You must check your blood sugar several times a  day. This is to be sure your insulin is working properly. If your blood sugar is too high or too low, your healthcare provider will adjust your insulin.  Low blood sugar  Taking insulin puts you at risk of low blood sugar. Always treat low blood sugar right away:  · Symptoms of low blood sugar include shakiness, dizziness, weakness, and confusion.  · If you feel any of these symptoms, check your blood sugar right away.  · To treat low blood sugar, eat 15 grams of fast-acting sugar (see below). Check your blood sugar again in 15 minutes. If your blood sugar is still low, eat another 15 grams of sugar.  · If your blood sugar does not return to target range in 30 minutes, call your healthcare provider.  15 grams of fast-acting sugar equals  · 3 glucose tablets  · 5 to 6 pieces of hard candy  · 1 to 2 tablespoons of honey or sugar  · ½ cup fruit juice or regular, non-diet soda  · 1 cup fat-free milk   Date Last Reviewed: 12/1/2016  © 3849-7126 [employer] [address] [place] [address], [place] [address]. All rights reserved. This information is not intended as a substitute for professional medical care. Always follow your healthcare professional's instructions.

## 2020-09-03 NOTE — PROGRESS NOTES
"    Subjective:      Tish Landeros is a 34 y.o. female who presents for evaluation of amenorrhea. She believes she could be pregnant. Pregnancy is desired. Sexual Activity: single partner, contraception: none. Current symptoms also include: positive home pregnancy test. Last period was normal.     Patient's last menstrual period was 07/10/2020.  The following portions of the patient's history were reviewed and updated as appropriate: allergies, current medications, past family history, past medical history, past social history, past surgical history and problem list.    LMP: July unsure  Med: DM Type 1 (NovaLog & Lantus), Test BG 4-6x per, states normal to high. States she need better control  OBHx: , SVDx3, denies complications  GYNHX: h/o HPV, H/o abnormal pap, had colop, denies excision  FamHx: Denies  Social: tobacco abuse, no EtOH, & no drug use    She is taking a PNV    Review of Systems  Pertinent items are noted in HPI.       Objective:      /74   Ht 5' 3" (1.6 m)   Wt 56.6 kg (124 lb 12.5 oz)   LMP 07/10/2020   BMI 22.10 kg/m²   General: alert, cooperative, no distress and no acute distress      Lab Review  Urine HCG: positive      ULTRASOUND:   Bedside Ultrasound Findings    EXAMINATION:  US PELVIS COMP WITH TRANSVAG OB    TECHNIQUE:  Transvaginal sonography    COMPARISON:  None    FINDINGS:  1. Uterus: normal   Appearance: Viable prince intrauterine pregnancy was seen, yolk sac was seen. Crown-rump length = 1.07 mm with flicker, consistent with 7.1wga and EDC 21.        Impression      1. Single viable intrauterine pregnancy   2. Crown rump length consistent with 7.1 wga, and estimated due date 21      Hemoglobin A1C   Date Value Ref Range Status   2020 9.8 (H) 4.0 - 5.6 % Final     Comment:     ADA Screening Guidelines:  5.7-6.4%  Consistent with prediabetes  >or=6.5%  Consistent with diabetes  High levels of fetal hemoglobin interfere with the HbA1C  assay. " Heterozygous hemoglobin variants (HbS, HgC, etc)do  not significantly interfere with this assay.   However, presence of multiple variants may affect accuracy.     2020 10.2 (H) 4.0 - 5.6 % Final     Comment:     ADA Screening Guidelines:  5.7-6.4%  Consistent with prediabetes  >or=6.5%  Consistent with diabetes  High levels of fetal hemoglobin interfere with the HbA1C  assay. Heterozygous hemoglobin variants (HbS, HgC, etc)do  not significantly interfere with this assay.   However, presence of multiple variants may affect accuracy.     2019 8.0 (H) 4.0 - 5.6 % Final     Comment:     ADA Screening Guidelines:  5.7-6.4%  Consistent with prediabetes  >or=6.5%  Consistent with diabetes  High levels of fetal hemoglobin interfere with the HbA1C  assay. Heterozygous hemoglobin variants (HbS, HgC, etc)do  not significantly interfere with this assay.   However, presence of multiple variants may affect accuracy.            Assessment:      Absence of menstruation.       Plan:      Pregnancy Test: Positive: EDC: 21. Briefly discussed pre-oliver care options. Pregnancy, Childbirth and the  book given. Encouraged well-balanced diet, plenty of rest when needed, pre-oliver vitamins daily and walking for exercise. Discussed self-help for nausea, avoiding OTC medications until consulting provider or pharmacist, other than Tylenol as needed, minimal caffeine (1-2 cups daily) and avoiding alcohol.     Type 1 DM: Last HgA1c 9.8 and admit in May for DKA. Cont Insulin, repeat HgA1C ordered, start glucose log, will need MFM referral & Fetal cardiac ECHO. Start Aspirin at 12 weeks.    Tobacco Abuse: counseling on cessation

## 2020-09-04 PROBLEM — O21.9 NAUSEA AND VOMITING IN PREGNANCY: Status: RESOLVED | Noted: 2020-03-15 | Resolved: 2020-09-04

## 2020-09-04 PROBLEM — A41.9 SEPSIS: Status: RESOLVED | Noted: 2020-01-17 | Resolved: 2020-09-04

## 2020-09-04 PROBLEM — R10.11 RIGHT UPPER QUADRANT ABDOMINAL PAIN: Status: RESOLVED | Noted: 2020-01-20 | Resolved: 2020-09-04

## 2020-09-04 PROBLEM — E10.9 TYPE 1 DIABETES: Chronic | Status: ACTIVE | Noted: 2020-05-06

## 2020-09-04 PROBLEM — O23.11: Status: RESOLVED | Noted: 2020-03-15 | Resolved: 2020-09-04

## 2020-10-01 ENCOUNTER — LAB VISIT (OUTPATIENT)
Dept: LAB | Facility: HOSPITAL | Age: 35
End: 2020-10-01
Attending: SPECIALIST
Payer: MEDICAID

## 2020-10-01 ENCOUNTER — ROUTINE PRENATAL (OUTPATIENT)
Dept: OBSTETRICS AND GYNECOLOGY | Facility: CLINIC | Age: 35
End: 2020-10-01
Payer: MEDICAID

## 2020-10-01 VITALS
WEIGHT: 129.44 LBS | BODY MASS INDEX: 22.92 KG/M2 | DIASTOLIC BLOOD PRESSURE: 72 MMHG | SYSTOLIC BLOOD PRESSURE: 110 MMHG

## 2020-10-01 DIAGNOSIS — Z3A.11 11 WEEKS GESTATION OF PREGNANCY: ICD-10-CM

## 2020-10-01 DIAGNOSIS — O09.521 MULTIGRAVIDA OF ADVANCED MATERNAL AGE IN FIRST TRIMESTER: ICD-10-CM

## 2020-10-01 DIAGNOSIS — O24.011 PRE-EXISTING TYPE 1 DIABETES MELLITUS DURING PREGNANCY IN FIRST TRIMESTER: ICD-10-CM

## 2020-10-01 DIAGNOSIS — R82.90 ABNORMAL URINE FINDINGS: ICD-10-CM

## 2020-10-01 DIAGNOSIS — Z3A.11 11 WEEKS GESTATION OF PREGNANCY: Primary | ICD-10-CM

## 2020-10-01 LAB
BILIRUB SERPL-MCNC: NORMAL MG/DL
BLOOD URINE, POC: NORMAL
CLARITY, POC UA: NORMAL
COLOR, POC UA: NORMAL
GLUCOSE UR QL STRIP: 1000
KETONES UR QL STRIP: NORMAL
LEUKOCYTE ESTERASE URINE, POC: NORMAL
MISCELLANEOUS GENETIC TEST NAME: NORMAL
NITRITE, POC UA: NORMAL
PH, POC UA: 5
PROTEIN, POC: NORMAL
SPECIFIC GRAVITY, POC UA: NORMAL
UROBILINOGEN, POC UA: NORMAL

## 2020-10-01 PROCEDURE — 99999 PR PBB SHADOW E&M-EST. PATIENT-LVL III: ICD-10-PCS | Mod: PBBFAC,,, | Performed by: SPECIALIST

## 2020-10-01 PROCEDURE — 36415 COLL VENOUS BLD VENIPUNCTURE: CPT | Mod: PO

## 2020-10-01 PROCEDURE — 99213 OFFICE O/P EST LOW 20 MIN: CPT | Mod: PBBFAC,TH,PN | Performed by: SPECIALIST

## 2020-10-01 PROCEDURE — 87086 URINE CULTURE/COLONY COUNT: CPT

## 2020-10-01 PROCEDURE — 87088 URINE BACTERIA CULTURE: CPT

## 2020-10-01 PROCEDURE — 87186 SC STD MICRODIL/AGAR DIL: CPT

## 2020-10-01 PROCEDURE — 99214 OFFICE O/P EST MOD 30 MIN: CPT | Mod: TH,S$PBB,, | Performed by: SPECIALIST

## 2020-10-01 PROCEDURE — 87077 CULTURE AEROBIC IDENTIFY: CPT

## 2020-10-01 PROCEDURE — 99999 PR PBB SHADOW E&M-EST. PATIENT-LVL III: CPT | Mod: PBBFAC,,, | Performed by: SPECIALIST

## 2020-10-01 PROCEDURE — 81002 URINALYSIS NONAUTO W/O SCOPE: CPT | Mod: PBBFAC,PN | Performed by: SPECIALIST

## 2020-10-01 PROCEDURE — 99214 PR OFFICE/OUTPT VISIT, EST, LEVL IV, 30-39 MIN: ICD-10-PCS | Mod: TH,S$PBB,, | Performed by: SPECIALIST

## 2020-10-01 RX ORDER — INSULIN ASPART 100 [IU]/ML
INJECTION, SOLUTION INTRAVENOUS; SUBCUTANEOUS
Status: ON HOLD | COMMUNITY
Start: 2020-08-30 | End: 2021-04-11 | Stop reason: SDUPTHER

## 2020-10-01 RX ORDER — ONDANSETRON 4 MG/1
4 TABLET, ORALLY DISINTEGRATING ORAL EVERY 6 HOURS PRN
Qty: 30 TABLET | Refills: 2 | Status: ON HOLD | OUTPATIENT
Start: 2020-10-01 | End: 2021-04-11 | Stop reason: HOSPADM

## 2020-10-01 NOTE — PROGRESS NOTES
Pt returns for carePNc   I had a 20 min discussion regarding high risk status  Poorly controlled DM  AMA   and Smoking history  I rec HAIC today and in addition, CFDNA, MAt 21  Pt Insulin requirement 15/15- 15/15  I discussed importance of compliance and will have pt RTO with diary in 3 weeks. In addition  Will consult MFM and follow  Answered all questions  RTO 3 weeks

## 2020-10-05 ENCOUNTER — PATIENT MESSAGE (OUTPATIENT)
Dept: OBSTETRICS AND GYNECOLOGY | Facility: CLINIC | Age: 35
End: 2020-10-05

## 2020-10-05 LAB — BACTERIA UR CULT: ABNORMAL

## 2020-10-05 RX ORDER — NITROFURANTOIN 25; 75 MG/1; MG/1
100 CAPSULE ORAL 2 TIMES DAILY
Qty: 14 CAPSULE | Refills: 0 | Status: SHIPPED | OUTPATIENT
Start: 2020-10-05 | End: 2020-10-12

## 2020-10-13 ENCOUNTER — TELEPHONE (OUTPATIENT)
Dept: OBSTETRICS AND GYNECOLOGY | Facility: CLINIC | Age: 35
End: 2020-10-13

## 2020-10-13 NOTE — TELEPHONE ENCOUNTER
----- Message from Mi Matthews sent at 10/13/2020  4:22 PM CDT -----  Contact: self  Type:  Test Results    Who Called:  patient   Name of Test (Lab/Mammo/Etc):  genetics test  Date of Test:  10/01/2020  Ordering Provider:  Enrique Crane  Where the test was performed:  in office  Best Call Back Number:  [phone] (home)     Additional Information:

## 2020-10-15 ENCOUNTER — TELEPHONE (OUTPATIENT)
Dept: MATERNAL FETAL MEDICINE | Facility: CLINIC | Age: 35
End: 2020-10-15

## 2020-10-15 NOTE — TELEPHONE ENCOUNTER
Patient's insulin was changed from Lantus to NPH per Dr. Talbert but patient is unable to have the NPH filled at this time due to insurance coverage. MFLUCIE RN contacted patient's [facility] pharmacy, spoke to Fazal and was told that patient's insurance will not cover more than 2 different insulins within a 30 day period. Patient will not be able to start the NPH until Nov 4 or 5. Dr. Kaur will be notified of this in the am.     Pt verbalized understanding of information.

## 2020-10-16 ENCOUNTER — TELEPHONE (OUTPATIENT)
Dept: MATERNAL FETAL MEDICINE | Facility: CLINIC | Age: 35
End: 2020-10-16

## 2020-10-16 ENCOUNTER — TELEPHONE (OUTPATIENT)
Dept: OBSTETRICS AND GYNECOLOGY | Facility: CLINIC | Age: 35
End: 2020-10-16

## 2020-10-16 NOTE — TELEPHONE ENCOUNTER
Spoke with devora with [address] agency and request pt results faxed to office. She said she would fax them and took down the fax number.     Pt informed that the request for the results has been made, she verbalized understanding.

## 2020-10-16 NOTE — TELEPHONE ENCOUNTER
ROMAN RN contacted the patient's insurance regarding Dr. Kaur's recommendation for patient to discontinue lantus and start NPH. Insurance denied NPH. ROMAN RN spoke to pharmacy and provided additional information and Henny with [facility] was able to authorize the NPH and cancel the lantus. Patient's [facility] pharmacy was contacted with this new information. Patient was also notified and will  the NPH to start today.    Pt verbalized understanding of information.

## 2020-10-16 NOTE — TELEPHONE ENCOUNTER
----- Message from Tapan Landeros sent at 10/16/2020 11:28 AM CDT -----  Regarding: results  Contact: pt  Type:  Patient Returning Call    Who Called:  pt  Does the patient know what this is regarding?:  please follow up with genetic results  Best Call Back Number:  [phone]  Additional Information:  Thank you

## 2020-12-14 ENCOUNTER — OFFICE VISIT (OUTPATIENT)
Dept: PEDIATRIC CARDIOLOGY | Facility: CLINIC | Age: 35
End: 2020-12-14
Payer: MEDICAID

## 2020-12-14 ENCOUNTER — CLINICAL SUPPORT (OUTPATIENT)
Dept: PEDIATRIC CARDIOLOGY | Facility: CLINIC | Age: 35
End: 2020-12-14
Payer: MEDICAID

## 2020-12-14 VITALS
BODY MASS INDEX: 25.29 KG/M2 | DIASTOLIC BLOOD PRESSURE: 55 MMHG | HEART RATE: 84 BPM | SYSTOLIC BLOOD PRESSURE: 106 MMHG | HEIGHT: 63 IN | WEIGHT: 142.75 LBS

## 2020-12-14 DIAGNOSIS — O24.312 PRE-EXISTING DIABETES MELLITUS AFFECTING PREGNANCY IN SECOND TRIMESTER, ANTEPARTUM: ICD-10-CM

## 2020-12-14 DIAGNOSIS — Z03.73 FETAL ANOMALY SUSPECTED BUT NOT FOUND: Primary | ICD-10-CM

## 2020-12-14 PROCEDURE — 76825 ECHO EXAM OF FETAL HEART: CPT | Mod: 26,S$PBB,, | Performed by: PEDIATRICS

## 2020-12-14 PROCEDURE — 76825 PR  SO2 FETAL HEART: ICD-10-PCS | Mod: 26,S$PBB,, | Performed by: PEDIATRICS

## 2020-12-14 PROCEDURE — 93325 PR DOPPLER COLOR FLOW VELOCITY MAP: ICD-10-PCS | Mod: 26,S$PBB,, | Performed by: PEDIATRICS

## 2020-12-14 PROCEDURE — 76827 PR  SO2 FETAL HEART DOPPLER: ICD-10-PCS | Mod: 26,S$PBB,, | Performed by: PEDIATRICS

## 2020-12-14 PROCEDURE — 99999 PR PBB SHADOW E&M-EST. PATIENT-LVL III: CPT | Mod: PBBFAC,,, | Performed by: PEDIATRICS

## 2020-12-14 PROCEDURE — 93325 DOPPLER ECHO COLOR FLOW MAPG: CPT | Mod: PBBFAC,PO | Performed by: PEDIATRICS

## 2020-12-14 PROCEDURE — 76827 ECHO EXAM OF FETAL HEART: CPT | Mod: PBBFAC,PO | Performed by: PEDIATRICS

## 2020-12-14 PROCEDURE — 99213 OFFICE O/P EST LOW 20 MIN: CPT | Mod: PBBFAC,PO | Performed by: PEDIATRICS

## 2020-12-14 PROCEDURE — 99999 PR PBB SHADOW E&M-EST. PATIENT-LVL III: ICD-10-PCS | Mod: PBBFAC,,, | Performed by: PEDIATRICS

## 2020-12-14 PROCEDURE — 76825 ECHO EXAM OF FETAL HEART: CPT | Mod: PBBFAC,PO | Performed by: PEDIATRICS

## 2020-12-14 PROCEDURE — 99203 OFFICE O/P NEW LOW 30 MIN: CPT | Mod: 25,S$PBB,, | Performed by: PEDIATRICS

## 2020-12-14 PROCEDURE — 99203 PR OFFICE/OUTPT VISIT, NEW, LEVL III, 30-44 MIN: ICD-10-PCS | Mod: 25,S$PBB,, | Performed by: PEDIATRICS

## 2020-12-14 PROCEDURE — 93325 DOPPLER ECHO COLOR FLOW MAPG: CPT | Mod: 26,S$PBB,, | Performed by: PEDIATRICS

## 2020-12-14 PROCEDURE — 76827 ECHO EXAM OF FETAL HEART: CPT | Mod: 26,S$PBB,, | Performed by: PEDIATRICS

## 2020-12-14 RX ORDER — ASPIRIN 81 MG/1
81 TABLET ORAL DAILY
Status: ON HOLD | COMMUNITY
End: 2021-04-11 | Stop reason: HOSPADM

## 2020-12-14 NOTE — PROGRESS NOTES
"[facility]    Today, I had the pleasure of evaluating Tish Landeros who is now 34 y.o. and carrying her sixth pregnancy at 21 2/7 weeks gestation with an ALBERTA of 21. She was referred for evaluation of the fetal heart due to a history of maternal diabetes mellitus.      She is carrying a female fetus, yet unnamed.      Obstetric History:    .  Her OB care is by Dr. Crane.  Her MFM care is by Dr. Kaur.    Past Medical History:   Diagnosis Date    Abnormal Pap smear of cervix     Diabetes in pregnancy 5/3/2019    Diabetes mellitus     diagnosed 6years ago.     Diabetes mellitus complicating pregnancy 2019    HPV in female     Pre-existing type 1 diabetes mellitus during pregnancy in third trimester 2019    Type 1 diabetes mellitus complicating pregnancy, antepartum 2019         Current Outpatient Medications:     aspirin (ECOTRIN) 81 MG EC tablet, Take 81 mg by mouth once daily., Disp: , Rfl:     BD ULTRA-FINE CATALINA PEN NEEDLE 32 gauge x 5/32" Ndle, 2 (two) times a day., Disp: , Rfl:     insulin glargine (LANTUS U-100 INSULIN) 100 unit/mL injection, Inject 18 Units into the skin every evening. Pt states she takes it around 11pm, Disp: , Rfl:     NOVOLOG FLEXPEN U-100 INSULIN 100 unit/mL (3 mL) InPn pen, INJECT 10 UNITS SUBCUTANEOUSLY 4 TIMES DAILY WITH MEALS FOR 30 DAYS, Disp: , Rfl:     prenatal vit,calc76/iron/folic (PNV 29-1 ORAL), Take 1 Dose by mouth once daily., Disp: , Rfl:     insulin NPH (NOVOLIN N NPH U-100 INSULIN) 100 unit/mL injection, Use as directed 15 Units at HS (Patient not taking: Reported on 2020), Disp: 20 mL, Rfl: 10    ondansetron (ZOFRAN) 4 MG tablet, Take 1 tablet (4 mg total) by mouth every 6 (six) hours as needed for Nausea. (Patient not taking: Reported on 2020), Disp: 20 tablet, Rfl: 0    ondansetron (ZOFRAN-ODT) 4 MG TbDL, Take 1 tablet (4 mg total) by mouth every 6 (six) hours as " needed. (Patient not taking: Reported on 2020), Disp: 30 tablet, Rfl: 2    promethazine (PHENERGAN) 25 MG tablet, Take 1 tablet (25 mg total) by mouth every 4 (four) hours as needed for Nausea. (Patient not taking: Reported on 2020), Disp: 20 tablet, Rfl: 0    Family History: Negative for congenital heart disease, early coronary artery disease, sudden unexplained death, connective tissues disorders, genetic syndromes, multiple miscarriages or other congenital anomalies.    Social History: Ms. Landeros is single.  The father of the baby is involved.     FETAL ECHOCARDIOGRAM (summary):  Fetal echocardiogram at 21 5/7 weeks gestation for a history of maternal diabetes mellitus. ALBERTA 21.  Normally connected heart.  Normal sinus rhythm.  No ectopy or sustained arrhythmia demonstrated throughout the study.  Normal fetal atrial and ductal level shunting.  No ventricular level shunting.  Normal atrioventricular and semilunar valve structure and function.  Normal ductal and aortic arches.  Normal biventricular size and systolic function.  No pericardial effusion.  (Full report in electronic medical record)    Impression:  Single active female fetus at 21 wga.  Normal fetal echocardiogram.      Todays fetal echocardiogram is normal, within the limitations of fetal echocardiography.  I discussed with her that fetal echocardiography is insufficiently sensitive to rule out all septal defects, anomalies of pulmonary and systemic veins, arch anomalies, and some valvar abnormalities, nor can it ensure that the ductus arteriosus and foramen ovale will spontaneously close.     Recommendations:  Location, timing, and mode of delivery will be determined by the obstetrical team.  She does not require further follow-up in the fetal echocardiography clinic, but I would be happy to see her again if additional questions or concerns arise.    Should there be any concerns about the baby's heart after birth, a post-oliver  echocardiogram and cardiology consultation are recommended.     The above information was discussed in detail including the use of diagrams, with 30 minutes of total face to face time, with greater than 50% with counseling and coordination of care.  The discussion of the diagnosis and treatment options is as described above.      Leonid Miranda MD, MPH  Pediatric and Fetal Cardiology  [facility]   [address] [place] [id number]    Office: [phone]  Cell: [phone]

## 2021-01-15 ENCOUNTER — TELEPHONE (OUTPATIENT)
Dept: OBSTETRICS AND GYNECOLOGY | Facility: CLINIC | Age: 36
End: 2021-01-15

## 2021-01-21 ENCOUNTER — ROUTINE PRENATAL (OUTPATIENT)
Dept: OBSTETRICS AND GYNECOLOGY | Facility: CLINIC | Age: 36
End: 2021-01-21
Payer: MEDICAID

## 2021-01-21 VITALS
WEIGHT: 146.63 LBS | DIASTOLIC BLOOD PRESSURE: 50 MMHG | SYSTOLIC BLOOD PRESSURE: 100 MMHG | BODY MASS INDEX: 25.97 KG/M2

## 2021-01-21 DIAGNOSIS — O24.011 PRE-EXISTING TYPE 1 DIABETES MELLITUS WITH HYPERGLYCEMIA DURING PREGNANCY IN FIRST TRIMESTER: ICD-10-CM

## 2021-01-21 DIAGNOSIS — Z3A.27 27 WEEKS GESTATION OF PREGNANCY: Primary | ICD-10-CM

## 2021-01-21 DIAGNOSIS — Z91.199 NONCOMPLIANCE: ICD-10-CM

## 2021-01-21 DIAGNOSIS — E10.65 PRE-EXISTING TYPE 1 DIABETES MELLITUS WITH HYPERGLYCEMIA DURING PREGNANCY IN FIRST TRIMESTER: ICD-10-CM

## 2021-01-21 LAB
BILIRUB SERPL-MCNC: NORMAL MG/DL
BLOOD URINE, POC: NORMAL
CLARITY, POC UA: NORMAL
COLOR, POC UA: NORMAL
GLUCOSE UR QL STRIP: NORMAL
KETONES UR QL STRIP: NORMAL
LEUKOCYTE ESTERASE URINE, POC: NORMAL
NITRITE, POC UA: NORMAL
PH, POC UA: 6
PROTEIN, POC: NORMAL
SPECIFIC GRAVITY, POC UA: NORMAL
UROBILINOGEN, POC UA: 1

## 2021-01-21 PROCEDURE — 99215 PR OFFICE/OUTPT VISIT, EST, LEVL V, 40-54 MIN: ICD-10-PCS | Mod: TH,S$PBB,, | Performed by: SPECIALIST

## 2021-01-21 PROCEDURE — 99999 PR PBB SHADOW E&M-EST. PATIENT-LVL II: ICD-10-PCS | Mod: PBBFAC,,, | Performed by: SPECIALIST

## 2021-01-21 PROCEDURE — 99212 OFFICE O/P EST SF 10 MIN: CPT | Mod: PBBFAC,TH,PN | Performed by: SPECIALIST

## 2021-01-21 PROCEDURE — 99215 OFFICE O/P EST HI 40 MIN: CPT | Mod: TH,S$PBB,, | Performed by: SPECIALIST

## 2021-01-21 PROCEDURE — 81002 URINALYSIS NONAUTO W/O SCOPE: CPT | Mod: PBBFAC,PN | Performed by: SPECIALIST

## 2021-01-21 PROCEDURE — 99999 PR PBB SHADOW E&M-EST. PATIENT-LVL II: CPT | Mod: PBBFAC,,, | Performed by: SPECIALIST

## 2021-02-04 ENCOUNTER — TELEPHONE (OUTPATIENT)
Dept: OBSTETRICS AND GYNECOLOGY | Facility: CLINIC | Age: 36
End: 2021-02-04

## 2021-02-04 DIAGNOSIS — Z3A.29 29 WEEKS GESTATION OF PREGNANCY: Primary | ICD-10-CM

## 2021-02-08 ENCOUNTER — TELEPHONE (OUTPATIENT)
Dept: OBSTETRICS AND GYNECOLOGY | Facility: CLINIC | Age: 36
End: 2021-02-08

## 2021-02-08 DIAGNOSIS — O24.313 PRE-EXISTING DIABETES MELLITUS DURING PREGNANCY IN THIRD TRIMESTER: ICD-10-CM

## 2021-02-08 DIAGNOSIS — Z3A.29 29 WEEKS GESTATION OF PREGNANCY: Primary | ICD-10-CM

## 2021-02-09 ENCOUNTER — ROUTINE PRENATAL (OUTPATIENT)
Dept: OBSTETRICS AND GYNECOLOGY | Facility: CLINIC | Age: 36
End: 2021-02-09
Payer: MEDICAID

## 2021-02-09 VITALS
DIASTOLIC BLOOD PRESSURE: 60 MMHG | WEIGHT: 150.13 LBS | BODY MASS INDEX: 26.59 KG/M2 | SYSTOLIC BLOOD PRESSURE: 110 MMHG

## 2021-02-09 DIAGNOSIS — O24.313 PRE-EXISTING DIABETES MELLITUS DURING PREGNANCY IN THIRD TRIMESTER: ICD-10-CM

## 2021-02-09 DIAGNOSIS — Z3A.29 29 WEEKS GESTATION OF PREGNANCY: Primary | ICD-10-CM

## 2021-02-09 LAB
BILIRUB SERPL-MCNC: NORMAL MG/DL
BLOOD URINE, POC: NORMAL
CLARITY, POC UA: NORMAL
COLOR, POC UA: YELLOW
GLUCOSE UR QL STRIP: 50
KETONES UR QL STRIP: NORMAL
LEUKOCYTE ESTERASE URINE, POC: NORMAL
NITRITE, POC UA: NORMAL
PH, POC UA: 9
PROTEIN, POC: NORMAL
SPECIFIC GRAVITY, POC UA: NORMAL
UROBILINOGEN, POC UA: NORMAL

## 2021-02-09 PROCEDURE — 99214 PR OFFICE/OUTPT VISIT, EST, LEVL IV, 30-39 MIN: ICD-10-PCS | Mod: TH,S$PBB,, | Performed by: SPECIALIST

## 2021-02-09 PROCEDURE — 99214 OFFICE O/P EST MOD 30 MIN: CPT | Mod: TH,S$PBB,, | Performed by: SPECIALIST

## 2021-02-09 PROCEDURE — 81002 URINALYSIS NONAUTO W/O SCOPE: CPT | Mod: PBBFAC,PN | Performed by: SPECIALIST

## 2021-02-09 PROCEDURE — 99999 PR PBB SHADOW E&M-EST. PATIENT-LVL I: CPT | Mod: PBBFAC,,, | Performed by: SPECIALIST

## 2021-02-09 PROCEDURE — 99211 OFF/OP EST MAY X REQ PHY/QHP: CPT | Mod: PBBFAC,TH,PN | Performed by: SPECIALIST

## 2021-02-09 PROCEDURE — 99999 PR PBB SHADOW E&M-EST. PATIENT-LVL I: ICD-10-PCS | Mod: PBBFAC,,, | Performed by: SPECIALIST

## 2021-02-10 ENCOUNTER — LAB VISIT (OUTPATIENT)
Dept: LAB | Facility: HOSPITAL | Age: 36
End: 2021-02-10
Attending: SPECIALIST
Payer: MEDICAID

## 2021-02-10 DIAGNOSIS — Z3A.29 29 WEEKS GESTATION OF PREGNANCY: ICD-10-CM

## 2021-02-10 DIAGNOSIS — O24.313 PRE-EXISTING DIABETES MELLITUS DURING PREGNANCY IN THIRD TRIMESTER: ICD-10-CM

## 2021-02-10 LAB
ESTIMATED AVG GLUCOSE: 194 MG/DL (ref 68–131)
GLUCOSE SERPL-MCNC: 155 MG/DL (ref 70–110)
HBA1C MFR BLD: 8.4 % (ref 4–5.6)

## 2021-02-10 PROCEDURE — 36415 COLL VENOUS BLD VENIPUNCTURE: CPT | Mod: PO

## 2021-02-10 PROCEDURE — 82947 ASSAY GLUCOSE BLOOD QUANT: CPT

## 2021-02-10 PROCEDURE — 83036 HEMOGLOBIN GLYCOSYLATED A1C: CPT

## 2021-02-11 ENCOUNTER — PATIENT MESSAGE (OUTPATIENT)
Dept: OBSTETRICS AND GYNECOLOGY | Facility: CLINIC | Age: 36
End: 2021-02-11

## 2021-02-23 ENCOUNTER — ROUTINE PRENATAL (OUTPATIENT)
Dept: OBSTETRICS AND GYNECOLOGY | Facility: CLINIC | Age: 36
End: 2021-02-23
Payer: MEDICAID

## 2021-02-23 VITALS
SYSTOLIC BLOOD PRESSURE: 110 MMHG | DIASTOLIC BLOOD PRESSURE: 64 MMHG | WEIGHT: 150.56 LBS | BODY MASS INDEX: 26.67 KG/M2

## 2021-02-23 DIAGNOSIS — Z3A.31 31 WEEKS GESTATION OF PREGNANCY: Primary | ICD-10-CM

## 2021-02-23 LAB
BILIRUB SERPL-MCNC: NORMAL MG/DL
BLOOD URINE, POC: NORMAL
CLARITY, POC UA: NORMAL
COLOR, POC UA: NORMAL
GLUCOSE UR QL STRIP: NORMAL
KETONES UR QL STRIP: NORMAL
LEUKOCYTE ESTERASE URINE, POC: NORMAL
NITRITE, POC UA: NORMAL
PH, POC UA: 6
PROTEIN, POC: NORMAL
SPECIFIC GRAVITY, POC UA: NORMAL
UROBILINOGEN, POC UA: NORMAL

## 2021-02-23 PROCEDURE — 99999 PR PBB SHADOW E&M-EST. PATIENT-LVL II: CPT | Mod: PBBFAC,,, | Performed by: SPECIALIST

## 2021-02-23 PROCEDURE — 59025 FETAL NON-STRESS TEST: CPT | Mod: PBBFAC,PN | Performed by: SPECIALIST

## 2021-02-23 PROCEDURE — 99999 PR PBB SHADOW E&M-EST. PATIENT-LVL II: ICD-10-PCS | Mod: PBBFAC,,, | Performed by: SPECIALIST

## 2021-02-23 PROCEDURE — 59025 FETAL NON-STRESS TEST: CPT | Mod: 26,S$PBB,, | Performed by: SPECIALIST

## 2021-02-23 PROCEDURE — 81002 URINALYSIS NONAUTO W/O SCOPE: CPT | Mod: PBBFAC,PN | Performed by: SPECIALIST

## 2021-02-23 PROCEDURE — 99212 OFFICE O/P EST SF 10 MIN: CPT | Mod: PBBFAC,PN | Performed by: SPECIALIST

## 2021-02-23 PROCEDURE — 99214 OFFICE O/P EST MOD 30 MIN: CPT | Mod: 25,S$PBB,TH, | Performed by: SPECIALIST

## 2021-02-23 PROCEDURE — 99214 PR OFFICE/OUTPT VISIT, EST, LEVL IV, 30-39 MIN: ICD-10-PCS | Mod: 25,S$PBB,TH, | Performed by: SPECIALIST

## 2021-02-23 PROCEDURE — 59025 PR FETAL 2N-STRESS TEST: ICD-10-PCS | Mod: 26,S$PBB,, | Performed by: SPECIALIST

## 2021-03-01 NOTE — NURSING
PIV removed. D/c instructions given and explained, pt verbalized understanding. rx for abx was given to pt. Pt aware of f/u appt. All questions answered. Pt ambulated off unit safely with all belongings with this nurse.     Sent as FYI to provider. (This is in regards to lab results).

## 2021-03-18 ENCOUNTER — ROUTINE PRENATAL (OUTPATIENT)
Dept: OBSTETRICS AND GYNECOLOGY | Facility: CLINIC | Age: 36
End: 2021-03-18
Payer: MEDICAID

## 2021-03-18 VITALS
WEIGHT: 155.44 LBS | SYSTOLIC BLOOD PRESSURE: 106 MMHG | DIASTOLIC BLOOD PRESSURE: 68 MMHG | BODY MASS INDEX: 27.53 KG/M2

## 2021-03-18 DIAGNOSIS — Z3A.35 35 WEEKS GESTATION OF PREGNANCY: Primary | ICD-10-CM

## 2021-03-18 DIAGNOSIS — O09.521 MULTIGRAVIDA OF ADVANCED MATERNAL AGE IN FIRST TRIMESTER: ICD-10-CM

## 2021-03-18 DIAGNOSIS — O24.011 PRE-EXISTING TYPE 1 DIABETES MELLITUS DURING PREGNANCY IN FIRST TRIMESTER: ICD-10-CM

## 2021-03-18 LAB
BILIRUB SERPL-MCNC: NEGATIVE MG/DL
BLOOD URINE, POC: NEGATIVE
CLARITY, POC UA: NORMAL
COLOR, POC UA: NORMAL
GLUCOSE UR QL STRIP: NORMAL
KETONES UR QL STRIP: NEGATIVE
LEUKOCYTE ESTERASE URINE, POC: NORMAL
NITRITE, POC UA: NEGATIVE
PH, POC UA: 6
PROTEIN, POC: NORMAL
SPECIFIC GRAVITY, POC UA: NORMAL
UROBILINOGEN, POC UA: NORMAL

## 2021-03-18 PROCEDURE — 81002 URINALYSIS NONAUTO W/O SCOPE: CPT | Mod: PBBFAC,PN | Performed by: SPECIALIST

## 2021-03-18 PROCEDURE — 99999 PR PBB SHADOW E&M-EST. PATIENT-LVL III: CPT | Mod: PBBFAC,,, | Performed by: SPECIALIST

## 2021-03-18 PROCEDURE — 99213 OFFICE O/P EST LOW 20 MIN: CPT | Mod: PBBFAC,TH,PN | Performed by: SPECIALIST

## 2021-03-18 PROCEDURE — 99214 OFFICE O/P EST MOD 30 MIN: CPT | Mod: TH,S$PBB,, | Performed by: SPECIALIST

## 2021-03-18 PROCEDURE — 99999 PR PBB SHADOW E&M-EST. PATIENT-LVL III: ICD-10-PCS | Mod: PBBFAC,,, | Performed by: SPECIALIST

## 2021-03-18 PROCEDURE — 99214 PR OFFICE/OUTPT VISIT, EST, LEVL IV, 30-39 MIN: ICD-10-PCS | Mod: TH,S$PBB,, | Performed by: SPECIALIST

## 2021-03-25 ENCOUNTER — ROUTINE PRENATAL (OUTPATIENT)
Dept: OBSTETRICS AND GYNECOLOGY | Facility: CLINIC | Age: 36
End: 2021-03-25
Payer: MEDICAID

## 2021-03-25 VITALS
DIASTOLIC BLOOD PRESSURE: 68 MMHG | WEIGHT: 154.56 LBS | BODY MASS INDEX: 27.38 KG/M2 | SYSTOLIC BLOOD PRESSURE: 110 MMHG

## 2021-03-25 DIAGNOSIS — Z3A.36 36 WEEKS GESTATION OF PREGNANCY: Primary | ICD-10-CM

## 2021-03-25 LAB
BILIRUB SERPL-MCNC: NORMAL MG/DL
BLOOD URINE, POC: NORMAL
CLARITY, POC UA: NORMAL
COLOR, POC UA: NORMAL
GLUCOSE UR QL STRIP: NORMAL
KETONES UR QL STRIP: NORMAL
LEUKOCYTE ESTERASE URINE, POC: NORMAL
NITRITE, POC UA: NORMAL
PH, POC UA: 6
PROTEIN, POC: NORMAL
SPECIFIC GRAVITY, POC UA: NORMAL
UROBILINOGEN, POC UA: 4

## 2021-03-25 PROCEDURE — 99214 OFFICE O/P EST MOD 30 MIN: CPT | Mod: TH,25,S$PBB, | Performed by: SPECIALIST

## 2021-03-25 PROCEDURE — 81002 URINALYSIS NONAUTO W/O SCOPE: CPT | Mod: PBBFAC,PN | Performed by: SPECIALIST

## 2021-03-25 PROCEDURE — 99214 PR OFFICE/OUTPT VISIT, EST, LEVL IV, 30-39 MIN: ICD-10-PCS | Mod: TH,25,S$PBB, | Performed by: SPECIALIST

## 2021-03-25 PROCEDURE — 59025 PR FETAL 2N-STRESS TEST: ICD-10-PCS | Mod: 26,S$PBB,, | Performed by: SPECIALIST

## 2021-03-25 PROCEDURE — 59025 FETAL NON-STRESS TEST: CPT | Mod: 26,S$PBB,, | Performed by: SPECIALIST

## 2021-03-25 PROCEDURE — 87081 CULTURE SCREEN ONLY: CPT | Performed by: SPECIALIST

## 2021-03-25 PROCEDURE — 59025 FETAL NON-STRESS TEST: CPT | Mod: PBBFAC,PN | Performed by: SPECIALIST

## 2021-03-25 PROCEDURE — 99213 OFFICE O/P EST LOW 20 MIN: CPT | Mod: PBBFAC,PN,25 | Performed by: SPECIALIST

## 2021-03-25 PROCEDURE — 99999 PR PBB SHADOW E&M-EST. PATIENT-LVL III: ICD-10-PCS | Mod: PBBFAC,,, | Performed by: SPECIALIST

## 2021-03-25 PROCEDURE — 99999 PR PBB SHADOW E&M-EST. PATIENT-LVL III: CPT | Mod: PBBFAC,,, | Performed by: SPECIALIST

## 2021-03-29 LAB — BACTERIA SPEC AEROBE CULT: NORMAL

## 2021-04-01 ENCOUNTER — ROUTINE PRENATAL (OUTPATIENT)
Dept: OBSTETRICS AND GYNECOLOGY | Facility: CLINIC | Age: 36
End: 2021-04-01
Payer: MEDICAID

## 2021-04-01 VITALS
WEIGHT: 157.88 LBS | BODY MASS INDEX: 27.96 KG/M2 | DIASTOLIC BLOOD PRESSURE: 70 MMHG | SYSTOLIC BLOOD PRESSURE: 104 MMHG

## 2021-04-01 DIAGNOSIS — Z3A.37 37 WEEKS GESTATION OF PREGNANCY: Primary | ICD-10-CM

## 2021-04-01 PROBLEM — O24.919 DM (DIABETES MELLITUS) IN PREGNANCY: Status: ACTIVE | Noted: 2021-04-01

## 2021-04-01 LAB
BILIRUB SERPL-MCNC: NORMAL MG/DL
BLOOD URINE, POC: NORMAL
CLARITY, POC UA: NORMAL
COLOR, POC UA: NORMAL
GLUCOSE UR QL STRIP: NORMAL
KETONES UR QL STRIP: NORMAL
LEUKOCYTE ESTERASE URINE, POC: NORMAL
NITRITE, POC UA: NORMAL
PH, POC UA: 7
PROTEIN, POC: NORMAL
SPECIFIC GRAVITY, POC UA: NORMAL
UROBILINOGEN, POC UA: 4

## 2021-04-01 PROCEDURE — 59025 FETAL NON-STRESS TEST: CPT | Mod: PBBFAC,PN | Performed by: SPECIALIST

## 2021-04-01 PROCEDURE — 81002 URINALYSIS NONAUTO W/O SCOPE: CPT | Mod: PBBFAC,PN | Performed by: SPECIALIST

## 2021-04-01 PROCEDURE — 99214 PR OFFICE/OUTPT VISIT, EST, LEVL IV, 30-39 MIN: ICD-10-PCS | Mod: 25,TH,S$PBB, | Performed by: SPECIALIST

## 2021-04-01 PROCEDURE — 99214 OFFICE O/P EST MOD 30 MIN: CPT | Mod: 25,TH,S$PBB, | Performed by: SPECIALIST

## 2021-04-01 PROCEDURE — 99999 PR PBB SHADOW E&M-EST. PATIENT-LVL III: ICD-10-PCS | Mod: PBBFAC,,, | Performed by: SPECIALIST

## 2021-04-01 PROCEDURE — 99213 OFFICE O/P EST LOW 20 MIN: CPT | Mod: PBBFAC,PN,25 | Performed by: SPECIALIST

## 2021-04-01 PROCEDURE — 99999 PR PBB SHADOW E&M-EST. PATIENT-LVL III: CPT | Mod: PBBFAC,,, | Performed by: SPECIALIST

## 2021-04-01 PROCEDURE — 59025 FETAL NON-STRESS TEST: CPT | Mod: 26,S$PBB,, | Performed by: SPECIALIST

## 2021-04-01 PROCEDURE — 59025 PR FETAL 2N-STRESS TEST: ICD-10-PCS | Mod: 26,S$PBB,, | Performed by: SPECIALIST

## 2021-04-06 ENCOUNTER — TELEPHONE (OUTPATIENT)
Dept: OBSTETRICS AND GYNECOLOGY | Facility: CLINIC | Age: 36
End: 2021-04-06

## 2021-04-07 PROBLEM — N89.8 VAGINAL DISCHARGE DURING PREGNANCY IN THIRD TRIMESTER: Status: ACTIVE | Noted: 2021-04-07

## 2021-04-07 PROBLEM — O26.893 VAGINAL DISCHARGE DURING PREGNANCY IN THIRD TRIMESTER: Status: ACTIVE | Noted: 2021-04-07

## 2021-04-08 PROBLEM — E16.2 HYPOGLYCEMIA: Status: ACTIVE | Noted: 2021-04-08

## 2021-04-15 ENCOUNTER — TELEPHONE (OUTPATIENT)
Dept: ENDOCRINOLOGY | Facility: CLINIC | Age: 36
End: 2021-04-15

## 2021-04-22 ENCOUNTER — TELEPHONE (OUTPATIENT)
Dept: OBSTETRICS AND GYNECOLOGY | Facility: CLINIC | Age: 36
End: 2021-04-22

## 2021-04-23 ENCOUNTER — POSTPARTUM VISIT (OUTPATIENT)
Dept: OBSTETRICS AND GYNECOLOGY | Facility: CLINIC | Age: 36
End: 2021-04-23
Payer: MEDICAID

## 2021-04-23 VITALS
WEIGHT: 138.88 LBS | HEIGHT: 63 IN | DIASTOLIC BLOOD PRESSURE: 70 MMHG | SYSTOLIC BLOOD PRESSURE: 110 MMHG | BODY MASS INDEX: 24.61 KG/M2

## 2021-04-23 PROCEDURE — 99999 PR PBB SHADOW E&M-EST. PATIENT-LVL III: CPT | Mod: PBBFAC,,, | Performed by: SPECIALIST

## 2021-04-23 PROCEDURE — 59430 PR CARE AFTER DELIVERY ONLY: ICD-10-PCS | Mod: ,,, | Performed by: SPECIALIST

## 2021-04-23 PROCEDURE — 99999 PR PBB SHADOW E&M-EST. PATIENT-LVL III: ICD-10-PCS | Mod: PBBFAC,,, | Performed by: SPECIALIST

## 2021-04-23 PROCEDURE — 99213 OFFICE O/P EST LOW 20 MIN: CPT | Mod: PBBFAC,PN | Performed by: SPECIALIST

## 2021-05-07 ENCOUNTER — TELEPHONE (OUTPATIENT)
Dept: OBSTETRICS AND GYNECOLOGY | Facility: CLINIC | Age: 36
End: 2021-05-07

## 2021-05-12 ENCOUNTER — PATIENT MESSAGE (OUTPATIENT)
Dept: RESEARCH | Facility: HOSPITAL | Age: 36
End: 2021-05-12

## 2021-05-17 ENCOUNTER — TELEPHONE (OUTPATIENT)
Dept: OBSTETRICS AND GYNECOLOGY | Facility: CLINIC | Age: 36
End: 2021-05-17

## 2021-05-18 ENCOUNTER — POSTPARTUM VISIT (OUTPATIENT)
Dept: OBSTETRICS AND GYNECOLOGY | Facility: CLINIC | Age: 36
End: 2021-05-18
Payer: MEDICAID

## 2021-05-18 VITALS
BODY MASS INDEX: 24.25 KG/M2 | WEIGHT: 136.88 LBS | DIASTOLIC BLOOD PRESSURE: 60 MMHG | HEIGHT: 63 IN | SYSTOLIC BLOOD PRESSURE: 119 MMHG

## 2021-05-18 PROCEDURE — 0503F POSTPARTUM CARE VISIT: CPT | Mod: ,,, | Performed by: SPECIALIST

## 2021-05-18 PROCEDURE — 99999 PR PBB SHADOW E&M-EST. PATIENT-LVL III: CPT | Mod: PBBFAC,,, | Performed by: SPECIALIST

## 2021-05-18 PROCEDURE — 99213 OFFICE O/P EST LOW 20 MIN: CPT | Mod: PBBFAC,PN | Performed by: SPECIALIST

## 2021-05-18 PROCEDURE — 99999 PR PBB SHADOW E&M-EST. PATIENT-LVL III: ICD-10-PCS | Mod: PBBFAC,,, | Performed by: SPECIALIST

## 2021-05-18 PROCEDURE — 0503F PR POSTPARTUM CARE VISIT: ICD-10-PCS | Mod: ,,, | Performed by: SPECIALIST

## 2021-05-21 DIAGNOSIS — Z30.2 STERILIZATION: Primary | ICD-10-CM

## 2021-05-21 DIAGNOSIS — Z01.812 PRE-PROCEDURE LAB EXAM: ICD-10-CM

## 2022-06-06 ENCOUNTER — HOSPITAL ENCOUNTER (EMERGENCY)
Facility: HOSPITAL | Age: 37
Discharge: HOME | End: 2022-06-06
Attending: EMERGENCY MEDICINE

## 2022-06-06 VITALS
WEIGHT: 128 LBS | BODY MASS INDEX: 22.68 KG/M2 | RESPIRATION RATE: 16 BRPM | DIASTOLIC BLOOD PRESSURE: 70 MMHG | HEIGHT: 63 IN | HEART RATE: 70 BPM | OXYGEN SATURATION: 98 % | SYSTOLIC BLOOD PRESSURE: 130 MMHG | TEMPERATURE: 98 F

## 2022-06-06 DIAGNOSIS — R73.9 HYPERGLYCEMIA: ICD-10-CM

## 2022-06-06 DIAGNOSIS — R11.2 NON-INTRACTABLE VOMITING WITH NAUSEA, UNSPECIFIED VOMITING TYPE: Primary | ICD-10-CM

## 2022-06-06 LAB
ALBUMIN SERPL BCP-MCNC: 4.4 G/DL (ref 3.5–5.2)
ALLENS TEST: ABNORMAL
ALP SERPL-CCNC: 57 U/L (ref 55–135)
ALT SERPL W/O P-5'-P-CCNC: 12 U/L (ref 10–44)
ANION GAP SERPL CALC-SCNC: 14 MMOL/L (ref 8–16)
AST SERPL-CCNC: 19 U/L (ref 10–40)
B-OH-BUTYR BLD STRIP-SCNC: 1.3 MMOL/L (ref 0–0.5)
BASOPHILS # BLD AUTO: 0.02 K/UL (ref 0–0.2)
BASOPHILS NFR BLD: 0.2 % (ref 0–1.9)
BILIRUB SERPL-MCNC: 0.5 MG/DL (ref 0.1–1)
BUN SERPL-MCNC: 11 MG/DL (ref 6–20)
CALCIUM SERPL-MCNC: 9.6 MG/DL (ref 8.7–10.5)
CHLORIDE SERPL-SCNC: 101 MMOL/L (ref 95–110)
CO2 SERPL-SCNC: 23 MMOL/L (ref 23–29)
CREAT SERPL-MCNC: 0.9 MG/DL (ref 0.5–1.4)
DELSYS: ABNORMAL
DIFFERENTIAL METHOD: ABNORMAL
EOSINOPHIL # BLD AUTO: 0 K/UL (ref 0–0.5)
EOSINOPHIL NFR BLD: 0 % (ref 0–8)
ERYTHROCYTE [DISTWIDTH] IN BLOOD BY AUTOMATED COUNT: 12.5 % (ref 11.5–14.5)
EST. GFR  (AFRICAN AMERICAN): >60 ML/MIN/1.73 M^2
EST. GFR  (NON AFRICAN AMERICAN): >60 ML/MIN/1.73 M^2
GLUCOSE SERPL-MCNC: 149 MG/DL (ref 70–110)
HCO3 UR-SCNC: 25.3 MMOL/L (ref 24–28)
HCT VFR BLD AUTO: 37.2 % (ref 37–48.5)
HGB BLD-MCNC: 13.1 G/DL (ref 12–16)
IMM GRANULOCYTES # BLD AUTO: 0.02 K/UL (ref 0–0.04)
IMM GRANULOCYTES NFR BLD AUTO: 0.2 % (ref 0–0.5)
LYMPHOCYTES # BLD AUTO: 0.8 K/UL (ref 1–4.8)
LYMPHOCYTES NFR BLD: 9.9 % (ref 18–48)
MCH RBC QN AUTO: 30.5 PG (ref 27–31)
MCHC RBC AUTO-ENTMCNC: 35.2 G/DL (ref 32–36)
MCV RBC AUTO: 87 FL (ref 82–98)
MONOCYTES # BLD AUTO: 0.2 K/UL (ref 0.3–1)
MONOCYTES NFR BLD: 1.9 % (ref 4–15)
NEUTROPHILS # BLD AUTO: 7 K/UL (ref 1.8–7.7)
NEUTROPHILS NFR BLD: 87.8 % (ref 38–73)
NRBC BLD-RTO: 0 /100 WBC
PCO2 BLDA: 39.4 MMHG (ref 35–45)
PH SMN: 7.42 [PH] (ref 7.35–7.45)
PLATELET # BLD AUTO: 206 K/UL (ref 150–450)
PMV BLD AUTO: 11.2 FL (ref 9.2–12.9)
PO2 BLDA: 25 MMHG (ref 40–60)
POC BE: 1 MMOL/L
POC SATURATED O2: 46 % (ref 95–100)
POC TCO2: 27 MMOL/L (ref 24–29)
POCT GLUCOSE: 140 MG/DL (ref 70–110)
POTASSIUM SERPL-SCNC: 3.5 MMOL/L (ref 3.5–5.1)
PROT SERPL-MCNC: 7.6 G/DL (ref 6–8.4)
RBC # BLD AUTO: 4.3 M/UL (ref 4–5.4)
SAMPLE: ABNORMAL
SITE: ABNORMAL
SODIUM SERPL-SCNC: 138 MMOL/L (ref 136–145)
WBC # BLD AUTO: 8.02 K/UL (ref 3.9–12.7)

## 2022-06-06 PROCEDURE — 96361 HYDRATE IV INFUSION ADD-ON: CPT

## 2022-06-06 PROCEDURE — 25000003 PHARM REV CODE 250: Performed by: EMERGENCY MEDICINE

## 2022-06-06 PROCEDURE — 82803 BLOOD GASES ANY COMBINATION: CPT

## 2022-06-06 PROCEDURE — 99900035 HC TECH TIME PER 15 MIN (STAT)

## 2022-06-06 PROCEDURE — 82962 GLUCOSE BLOOD TEST: CPT

## 2022-06-06 PROCEDURE — 93010 EKG 12-LEAD: ICD-10-PCS | Mod: ,,, | Performed by: INTERNAL MEDICINE

## 2022-06-06 PROCEDURE — 93005 ELECTROCARDIOGRAM TRACING: CPT

## 2022-06-06 PROCEDURE — 36415 COLL VENOUS BLD VENIPUNCTURE: CPT | Performed by: EMERGENCY MEDICINE

## 2022-06-06 PROCEDURE — 63600175 PHARM REV CODE 636 W HCPCS: Performed by: EMERGENCY MEDICINE

## 2022-06-06 PROCEDURE — 85025 COMPLETE CBC W/AUTO DIFF WBC: CPT | Performed by: EMERGENCY MEDICINE

## 2022-06-06 PROCEDURE — 93010 ELECTROCARDIOGRAM REPORT: CPT | Mod: ,,, | Performed by: INTERNAL MEDICINE

## 2022-06-06 PROCEDURE — 82010 KETONE BODYS QUAN: CPT | Performed by: EMERGENCY MEDICINE

## 2022-06-06 PROCEDURE — 80053 COMPREHEN METABOLIC PANEL: CPT | Performed by: EMERGENCY MEDICINE

## 2022-06-06 PROCEDURE — 96375 TX/PRO/DX INJ NEW DRUG ADDON: CPT

## 2022-06-06 PROCEDURE — 96365 THER/PROPH/DIAG IV INF INIT: CPT

## 2022-06-06 PROCEDURE — 99285 EMERGENCY DEPT VISIT HI MDM: CPT | Mod: 25

## 2022-06-06 RX ORDER — PROMETHAZINE HYDROCHLORIDE 25 MG/1
25 TABLET ORAL EVERY 6 HOURS PRN
Qty: 15 TABLET | Refills: 0 | Status: SHIPPED | OUTPATIENT
Start: 2022-06-06

## 2022-06-06 RX ORDER — MAG HYDROX/ALUMINUM HYD/SIMETH 200-200-20
15 SUSPENSION, ORAL (FINAL DOSE FORM) ORAL
Status: COMPLETED | OUTPATIENT
Start: 2022-06-06 | End: 2022-06-06

## 2022-06-06 RX ORDER — PANTOPRAZOLE SODIUM 40 MG/1
40 TABLET, DELAYED RELEASE ORAL DAILY
Status: DISCONTINUED | OUTPATIENT
Start: 2022-06-06 | End: 2022-06-06

## 2022-06-06 RX ORDER — ONDANSETRON 2 MG/ML
4 INJECTION INTRAMUSCULAR; INTRAVENOUS
Status: COMPLETED | OUTPATIENT
Start: 2022-06-06 | End: 2022-06-06

## 2022-06-06 RX ADMIN — ALUMINUM HYDROXIDE, MAGNESIUM HYDROXIDE, AND SIMETHICONE 15 ML: 200; 200; 20 SUSPENSION ORAL at 01:06

## 2022-06-06 RX ADMIN — SODIUM CHLORIDE 1000 ML: 0.9 INJECTION, SOLUTION INTRAVENOUS at 12:06

## 2022-06-06 RX ADMIN — ONDANSETRON 4 MG: 2 INJECTION INTRAMUSCULAR; INTRAVENOUS at 12:06

## 2022-06-06 RX ADMIN — PROMETHAZINE HYDROCHLORIDE 25 MG: 25 INJECTION INTRAMUSCULAR; INTRAVENOUS at 01:06

## 2022-06-06 NOTE — ED NOTES
Assumed care from: DUYEN Ramos:  Tish Landeros is awake, alert and oriented x 3, skin warm and dry, in NAD. She was ambulatory to OF 02 without assistance. She currently has phenergan IV running.

## 2022-06-06 NOTE — ED PROVIDER NOTES
Encounter Date: 2022    SCRIBE #1 NOTE: IKailee, jd scribing for, and in the presence of, Rey Perez MD.       History     Chief Complaint   Patient presents with    Vomiting     Started today / son was ill yesterday      Time seen by provider: 12:19 PM on 2022    Tish Landeros is a 36 y.o. female who presents to the ED with an onset of persistent N/V with associated abdominal pain that began today. The patient denies fever, burning with urination, or any other symptoms at this time. Her son was sick yesterday. Pt's blood sugars have been elevated since onset of Sx. PMHx of Type 1 DM. PSHx of .    The history is provided by the patient.     Review of patient's allergies indicates:   Allergen Reactions    Ciprofloxacin      Past Medical History:   Diagnosis Date    Abnormal Pap smear of cervix     Diabetes in pregnancy 5/3/2019    Diabetes mellitus     diagnosed 6years ago.     Diabetes mellitus complicating pregnancy 2019    HPV in female     Pre-existing type 1 diabetes mellitus during pregnancy in third trimester 2019    Type 1 diabetes mellitus complicating pregnancy, antepartum 2019     Past Surgical History:   Procedure Laterality Date    CERVICAL BIOPSY  W/ LOOP ELECTRODE EXCISION      CERVICAL BIOPSY  W/ LOOP ELECTRODE EXCISION       SECTION N/A 2021    Procedure:  SECTION;  Surgeon: Enrique Crane MD;  [facility];  Service: OB/GYN;  Laterality: N/A;     Family History   Problem Relation Age of Onset    Diabetes Father         type 1    Hypertension Mother     Heart disease Mother         stent    Congenital heart disease Paternal Aunt          of hole in heart    Diabetes Sister         Juvenile DM    Arrhythmia Neg Hx     Cardiomyopathy Neg Hx     Pacemaker/defibrilator Neg Hx     Heart attacks under age 50 Neg Hx     Breast cancer Neg Hx     Ovarian cancer Neg Hx      Social History     Tobacco  Use    Smoking status: Current Every Day Smoker     Packs/day: 0.50     Years: 15.00     Pack years: 7.50     Types: Cigarettes    Smokeless tobacco: Never Used   Substance Use Topics    Alcohol use: Not Currently     Comment: very rare    Drug use: No     Review of Systems   Constitutional: Negative for fever.   HENT: Negative for sore throat.    Respiratory: Negative for shortness of breath.    Cardiovascular: Negative for chest pain.   Gastrointestinal: Positive for abdominal pain, nausea and vomiting.   Genitourinary: Negative for dysuria.   Musculoskeletal: Negative for back pain.   Skin: Negative for rash.   Neurological: Negative for weakness.   Hematological: Does not bruise/bleed easily.       Physical Exam     Initial Vitals [06/06/22 1204]   BP Pulse Resp Temp SpO2   (!) 144/81 73 20 97.8 °F (36.6 °C) 98 %      MAP       --         Physical Exam    Nursing note and vitals reviewed.  Constitutional: Vital signs are normal. She appears well-developed and well-nourished.  Non-toxic appearance.   Uncomfortable appearing.   HENT:   Head: Normocephalic and atraumatic.   Eyes: EOM are normal. Pupils are equal, round, and reactive to light.   Neck: Neck supple. No JVD present.   Normal range of motion.  Cardiovascular: Normal rate, regular rhythm, normal heart sounds and intact distal pulses. Exam reveals no gallop and no friction rub.    No murmur heard.  Pulmonary/Chest: Breath sounds normal. She has no wheezes. She has no rhonchi. She has no rales.   Abdominal: Abdomen is soft. Bowel sounds are normal. There is abdominal tenderness in the epigastric area.   Mild epigastric tenderness. There is no rebound and no guarding.   Musculoskeletal:         General: Normal range of motion.      Cervical back: Normal range of motion and neck supple. No rigidity.     Neurological: She is alert and oriented to person, place, and time. She has normal strength and normal reflexes. No cranial nerve deficit or sensory  deficit. She exhibits normal muscle tone. Coordination normal. GCS eye subscore is 4. GCS verbal subscore is 5. GCS motor subscore is 6.   Skin: Skin is warm and dry.   Psychiatric: She has a normal mood and affect. Her speech is normal and behavior is normal. She is not actively hallucinating.         ED Course   Procedures  Labs Reviewed   CBC W/ AUTO DIFFERENTIAL - Abnormal; Notable for the following components:       Result Value    Lymph # 0.8 (*)     Mono # 0.2 (*)     Gran % 87.8 (*)     Lymph % 9.9 (*)     Mono % 1.9 (*)     All other components within normal limits   COMPREHENSIVE METABOLIC PANEL - Abnormal; Notable for the following components:    Glucose 149 (*)     All other components within normal limits   BETA - HYDROXYBUTYRATE, SERUM - Abnormal; Notable for the following components:    Beta-Hydroxybutyrate 1.3 (*)     All other components within normal limits   ISTAT PROCEDURE - Abnormal; Notable for the following components:    POC PO2 25 (*)     POC SATURATED O2 46 (*)     All other components within normal limits   POCT GLUCOSE - Abnormal; Notable for the following components:    POCT Glucose 140 (*)     All other components within normal limits     EKG Readings: (Independently Interpreted)   Sinus rhythm with short NM, 67 beats per minute.  QT prolongation.  Normal ST segments, normal T-waves.     ECG Results          EKG 12-lead (In process)  Result time 06/07/22 07:13:08    In process by Interface, Lab In [facility] (06/07/22 07:13:08)                 Narrative:    Test Reason : R73.9,    Vent. Rate : 067 BPM     Atrial Rate : 067 BPM     P-R Int : 108 ms          QRS Dur : 082 ms      QT Int : 456 ms       P-R-T Axes : 048 069 063 degrees     QTc Int : 481 ms    Sinus rhythm with short NM  Prolonged QT  Abnormal ECG  When compared with ECG of 05-MAY-2020 14:40,  Vent. rate has decreased BY  37 BPM    Referred By: [email or web address]   SELF           Confirmed By:                             Imaging Results           X-Ray Chest AP Portable (Final result)  Result time 06/06/22 13:10:54    Final result by Virgie Almaguer MD (06/06/22 13:10:54)                 Impression:      No acute abnormality.      Electronically signed by: Virgie Almaguer MD  Date:    06/06/2022  Time:    13:10             Narrative:    EXAMINATION:  XR CHEST AP PORTABLE    CLINICAL HISTORY:  hyperglycemia;    TECHNIQUE:  Single frontal view of the chest was performed.    COMPARISON:  05/05/2020    FINDINGS:  The lungs are clear, with normal appearance of pulmonary vasculature and no pleural effusion or pneumothorax.    The cardiac silhouette is normal in size. The hilar and mediastinal contours are unremarkable.    Bones are intact.                                 Medications   sodium chloride 0.9% bolus 1,000 mL (0 mLs Intravenous Stopped 6/6/22 1340)   ondansetron injection 4 mg (4 mg Intravenous Given 6/6/22 1239)   promethazine (PHENERGAN) 25 mg in dextrose 5 % 50 mL IVPB (0 mg Intravenous Stopped 6/6/22 1342)   aluminum-magnesium hydroxide-simethicone 200-200-20 mg/5 mL suspension 15 mL (15 mLs Oral Given 6/6/22 1324)     Medical Decision Making:   History:   Old Medical Records: I decided to obtain old medical records.  Initial Assessment:   36-year-old woman presents with nausea vomiting, abdominal pain that began today.  Her child was recently sick with the same symptoms and had resolved.  She does complain some mild epigastric pain and nausea.  She treats treated with fluids, Zofran and Phenergan in the ED with significant improvement her symptoms inability tolerate oral intake.  I suspected transient viral syndrome.  She appears well hydrated vital signs are normal.  She is appropriate for outpatient follow-up and discharged with treatment of Phenergan and gentle advancement of diet at home and oral hydration.  Return precautions discussed, discharged improved in no acute distress.  Independently Interpreted Test(s):   I have ordered  and independently interpreted EKG Reading(s) - see prior notes  Clinical Tests:   Lab Tests: Ordered and Reviewed  Radiological Study: Reviewed and Ordered  Medical Tests: Reviewed and Ordered          Scribe Attestation:   Scribe #1: I performed the above scribed service and the documentation accurately describes the services I performed. I attest to the accuracy of the note.               I, Chris Rodgers, personally performed the services described in this documentation. All medical record entries made by the scribe were at my direction and in my presence.  I have reviewed the chart and agree that the record reflects my personal performance and is accurate and complete. Rey Perez MD.  7:59 AM 06/07/2022       DISCLAIMER: This note was prepared with [employer] Direct voice recognition transcription software. Garbled syntax, mangled pronouns, and other bizarre constructions may be attributed to that software system.    Clinical Impression:   Final diagnoses:  [R73.9] Hyperglycemia  [R11.2] Non-intractable vomiting with nausea, unspecified vomiting type (Primary)          ED Disposition Condition    Discharge Stable        ED Prescriptions     Medication Sig Dispense Start Date End Date Auth. Provider    promethazine (PHENERGAN) 25 MG tablet Take 1 tablet (25 mg total) by mouth every 6 (six) hours as needed for Nausea. 15 tablet 6/6/2022  Rey Perez MD        Follow-up Information     Follow up With Specialties Details Why Contact [facility] Emergency Dept Emergency Medicine  As needed, If symptoms worsen [address] [place] [address] [id number] [phone]    [address]  [phone]           Rey Perez MD  06/07/22 0759       Rey Perez MD  06/07/22 0751

## 2022-06-06 NOTE — ED NOTES
Assumed care:  Tish Landeros is awake, alert and oriented x 3, skin warm and dry, in NAD.  Patient CO abd pain with NV&D that started this morning.      Patient identifiers for Tish Landeros checked and correct.  LOC:  Tish Landeros is awake, alert, and aware of environment with an appropriate affect. She is oriented x 3 and speaking appropriately.  APPEARANCE:  She is resting comfortably and in no acute distress. She is clean and well groomed, patient's clothing is properly fastened.  SKIN:  The skin is warm and dry. She has normal skin turgor and moist mucus membranes. Skin is intact; no bruising or breakdown noted.  MUSCULOSKELETAL:  She is moving all extremities well, no obvious deformities noted. Pulses intact.   RESPIRATORY:  Airway is open and patent. Respirations are spontaneous and non-labored with normal effort and rate.  CARDIAC:  She has a normal rate and rhythm. No peripheral edema noted. Capillary refill < 3 seconds.  ABDOMEN:  No distention noted.  Soft and non-tender upon palpation.  abd pain, NVD  NEUROLOGICAL:  PERRL. Facial expression is symmetrical. Hand grasps are equal bilaterally. Normal sensation in all extremities when touched with finger.  Allergies reported:    Review of patient's allergies indicates:   Allergen Reactions    Ciprofloxacin      OTHER NOTES:

## 2022-06-06 NOTE — ED NOTES
Pt AAOx4, Abc's intact. NADN. No adverse reaction to medication given. D/C instructions and Rx in pt possession along with belongings. No other needs at this time. Pt aware that she has an Rx to  at pharmacy. She verbalized understanding. Pt ambulatory to ED Lobby without assistance, steady gait.

## 2025-04-22 NOTE — H&P
[facility] Hospital Medicine  History & Physical    Patient Name: Tish Landeros  MRN: [id number]  Admission Date: 3/14/2020  Attending Physician: Logan Simons MD   Primary Care Provider: Primary Doctor No         Patient information was obtained from patient, past medical records and ER records.     Subjective:     Principal Problem:Acute cystitis    Chief Complaint:   Chief Complaint   Patient presents with    Abdominal Pain     left flank pain.        HPI: Tish Landeros is a 34 y.o. female with a past medical history of type 1 diabetes, tobacco abuse, and pyelonephritis who presents to the ED with complaints of left lower abdominal pain radiating to her flank area that began about 5 days ago.  She denies any aggravating factors but reports alleviation of pain after receiving IV fluids and antibiotics in the ED.  Patient endorses associated nausea, fever, and decreased appetite intermittently over the last 5 days.  Patient denies any chest pain, shortness of breath, vomiting, diarrhea, vaginal bleeding, weakness, or headache. She reports taking her insulin as prescribed. Of note the patient reports being around 6-8 weeks pregnant, she has not seen her OB/GYN since her positive pregnancy test but has an appointment scheduled. Her ED workup is significant for hyperglycemia, UTI, elevated hydroxybutyrate, hyponatremia, hypocalcemia, and positive pregnancy test.  The patient received 2 L normal saline, IV Rocephin, and Reglan in the ED.  The patient will be placed in observation under Hospital Medicine for further workup and evaluation.      Past Medical History:   Diagnosis Date    Abnormal Pap smear of cervix     Diabetes in pregnancy 5/3/2019    Diabetes mellitus     diagnosed 6years ago.     Diabetes mellitus complicating pregnancy 5/26/2019    HPV in female     Pre-existing type 1 diabetes mellitus during pregnancy in third trimester 4/16/2019    Type 1 diabetes  Submitted a prior auth through [employer]. It was approved for Gelsyn- 3 through medical benefits. Approval scanned into patients chart. Will order injections and once they are received I will get the patient scheduled for the injections.   mellitus complicating pregnancy, antepartum 1/7/2019       Past Surgical History:   Procedure Laterality Date    CERVICAL BIOPSY  W/ LOOP ELECTRODE EXCISION         Review of patient's allergies indicates:   Allergen Reactions    Ciprofloxacin        No current facility-administered medications on file prior to encounter.      Current Outpatient Medications on File Prior to Encounter   Medication Sig    ibuprofen (ADVIL,MOTRIN) 600 MG tablet Take 1 tablet (600 mg total) by mouth every 6 (six) hours as needed for Pain.    insulin (BASAGLAR KWIKPEN U-100 INSULIN) glargine 100 units/mL (3mL) SubQ pen Inject 18 Units into the skin 2 (two) times daily.    insulin aspart U-100 (NOVOLOG U-100 INSULIN ASPART) 100 unit/mL injection 10 units prn elevated glucose  Maximun 40 units daily (Patient taking differently: Inject 10 Units into the skin 3 (three) times daily as needed for High Blood Sugar. 10 units prn elevated glucose  Maximun 40 units daily)     Family History     Problem Relation (Age of Onset)    Congenital heart disease Paternal Aunt    Diabetes Father, Sister    Heart disease Mother    Hypertension Mother        Tobacco Use    Smoking status: Current Every Day Smoker     Packs/day: 0.50     Years: 10.00     Pack years: 5.00    Smokeless tobacco: Never Used   Substance and Sexual Activity    Alcohol use: Yes     Frequency: Never    Drug use: No    Sexual activity: Yes     Partners: Male     Birth control/protection: None     Review of Systems   Constitutional: Positive for appetite change (decreased) and fever (on and off fever for 5 days). Negative for activity change, chills, diaphoresis and fatigue.   HENT: Negative for congestion, ear pain, postnasal drip, rhinorrhea and sore throat.    Eyes: Negative for photophobia, pain, redness and visual disturbance.   Respiratory: Negative for cough, chest tightness, shortness of breath and wheezing.    Cardiovascular: Negative for chest pain, palpitations and  leg swelling.   Gastrointestinal: Positive for abdominal pain (left lower quadrant) and nausea. Negative for abdominal distention, constipation, diarrhea and vomiting.   Genitourinary: Positive for flank pain (left). Negative for difficulty urinating, dysuria, frequency, hematuria and urgency.   Musculoskeletal: Negative for arthralgias, gait problem, myalgias and neck pain.   Skin: Negative for color change, pallor, rash and wound.   Neurological: Negative for dizziness, syncope, weakness, light-headedness, numbness and headaches.   Psychiatric/Behavioral: Negative for agitation, confusion and hallucinations. The patient is not nervous/anxious.      Objective:     Vital Signs (Most Recent):  Temp: 98.7 °F (37.1 °C) (03/14/20 2125)  Pulse: 81 (03/15/20 0302)  Resp: 20 (03/14/20 2125)  BP: 102/60 (03/15/20 0302)  SpO2: 99 % (03/15/20 0302) Vital Signs (24h Range):  Temp:  [98.7 °F (37.1 °C)] 98.7 °F (37.1 °C)  Pulse:  [] 81  Resp:  [20] 20  SpO2:  [96 %-100 %] 99 %  BP: ()/(53-81) 102/60     Weight: 54.4 kg (120 lb)  Body mass index is 21.26 kg/m².    Physical Exam   Constitutional: She is oriented to person, place, and time. She appears well-developed and well-nourished. No distress.   HENT:   Head: Normocephalic and atraumatic.   Right Ear: External ear normal.   Left Ear: External ear normal.   Mouth/Throat: Oropharynx is clear and moist. Dental caries present.   Eyes: Pupils are equal, round, and reactive to light. EOM are normal.   Neck: Normal range of motion. No JVD present.   Cardiovascular: Normal rate, regular rhythm and intact distal pulses.   No murmur heard.  Pulmonary/Chest: Effort normal and breath sounds normal. No respiratory distress. She has no wheezes.   Lungs CTA bilaterally; currently on room air   Abdominal: Soft. Bowel sounds are normal. She exhibits no distension. There is no tenderness.   Genitourinary: Vagina normal.   Musculoskeletal: Normal range of motion. She exhibits no  edema or deformity.   Neurological: She is alert and oriented to person, place, and time. No sensory deficit. Coordination normal.   Skin: Skin is warm and dry. Capillary refill takes less than 2 seconds. She is not diaphoretic.   Psychiatric: She has a normal mood and affect.   Nursing note and vitals reviewed.        CRANIAL NERVES     CN III, IV, VI   Pupils are equal, round, and reactive to light.  Extraocular motions are normal.        Significant Labs:   BMP:   Recent Labs   Lab 03/14/20  2219   *   *   K 3.7      CO2 15*   BUN 7   CREATININE 0.9   CALCIUM 8.5*     CMP:   Recent Labs   Lab 03/14/20  2219   *   K 3.7      CO2 15*   *   BUN 7   CREATININE 0.9   CALCIUM 8.5*   PROT 6.6   ALBUMIN 2.7*   BILITOT 0.3   ALKPHOS 111   AST 7*   ALT 9*   ANIONGAP 16   EGFRNONAA >60     Lactic Acid:   Recent Labs   Lab 03/15/20  0011   LACTATE 0.8     Urine Studies:   Recent Labs   Lab 03/14/20  2141   COLORU Yellow   APPEARANCEUA Clear   PHUR 6.0   SPECGRAV 1.020   PROTEINUA Negative   GLUCUA 3+*   KETONESU 3+*   BILIRUBINUA 1+*   OCCULTUA 1+*   NITRITE Negative   UROBILINOGEN Negative   LEUKOCYTESUR Negative   RBCUA 5*   WBCUA 13*   BACTERIA Few*   SQUAMEPITHEL 35     All pertinent labs within the past 24 hours have been reviewed.    Significant Imaging: I have reviewed and interpreted all pertinent imaging results/findings within the past 24 hours.    Assessment/Plan:     * Acute cystitis  -UA reviewed, follow culture.  Reviewed previous urine culture, positive for E coli, pansensitive.  -Lactic acid negative.  -Patient received multiple fluid boluses in ED.  -Rocephin given in the ED.  -Will plan to transition to oral antibiotics and possibly discharge later today if blood glucose remains stable and patient is able to tolerate p.o. Intake.  -Will defer to the attending for antibiotic choice.  -Renal ultrasound reviewed, no acute abnormality noted.    Pre-existing type 1 diabetes  mellitus with hyperglycemia during pregnancy in first trimester  Patient's FSGs are controlled on current hypoglycemics.   Last A1c reviewed-   Lab Results   Component Value Date    HGBA1C 10.2 (H) 01/17/2020     Most recent fingerstick glucose reviewed-   Recent Labs   Lab 03/14/20  2330 03/15/20  0027   POCTGLUCOSE 217* 156*     Current correctional scale  Medium  Maintain anti-hyperglycemic dose as follows-   Antihyperglycemics (From admission, onward)    Start     Stop Route Frequency Ordered    03/15/20 0900  insulin detemir U-100 pen 12 Units      -- SubQ 2 times daily 03/15/20 0351    03/15/20 0556  insulin aspart U-100 pen 1-10 Units      -- SubQ Before meals & nightly PRN 03/15/20 0456        Likely experiencing hyperglycemia secondary to infection and dehydration.  Accu-Cheks AC/HS.  Diabetic diet.  Continue long-acting insulin b.i.d. and sliding scale p.r.n.    Less than 8 weeks gestation of pregnancy  Patient reports being for 7-8 weeks pregnant.  Confirmed with beta hCG.  OB ultrasound obtained to confirm intrauterine pregnancy-Single live IUP, estimated gestation 5w, 5d.  Prenatal vitamin daily.  Patient reports having outpatient appointment with OB for follow-up care.    Tobacco abuse  Assistance with smoking cessation was offered, including:  [x]  Medications  [x]  Counseling  []  Printed Information on Smoking Cessation  []  Referral to a Smoking Cessation Program    Patient was counseled regarding smoking for 3-10 minutes.    Nausea and vomiting in pregnancy  Likely secondary to infection and hyperglycemia.  Patient reports improvement of symptoms after receiving IV fluid boluses and dose of Reglan in the ED.  P.r.n. pyridoxine for nausea/vomiting.    Hypocalcemia  Corrected calcium 9.5.  Will continue to monitor.    Hyponatremia  Corrected sodium 135.  Will continue to monitor.      VTE Risk Mitigation (From admission, onward)         Ordered     IP VTE LOW RISK PATIENT  Once      03/15/20 0351      Place sequential compression device  Until discontinued      03/15/20 0351                   Elidia Higginbotham NP  Department of Hospital Medicine   [facility]